# Patient Record
Sex: FEMALE | Race: WHITE
[De-identification: names, ages, dates, MRNs, and addresses within clinical notes are randomized per-mention and may not be internally consistent; named-entity substitution may affect disease eponyms.]

---

## 2017-04-14 ENCOUNTER — HOSPITAL ENCOUNTER (EMERGENCY)
Dept: HOSPITAL 68 - ERH | Age: 82
End: 2017-04-14
Payer: COMMERCIAL

## 2017-04-14 VITALS — SYSTOLIC BLOOD PRESSURE: 142 MMHG | DIASTOLIC BLOOD PRESSURE: 72 MMHG

## 2017-04-14 VITALS — BODY MASS INDEX: 36.82 KG/M2 | WEIGHT: 221 LBS | HEIGHT: 65 IN
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
## 2017-04-14 DIAGNOSIS — M54.2: ICD-10-CM

## 2017-04-14 DIAGNOSIS — S09.90XA: Primary | ICD-10-CM
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
## 2017-04-14 NOTE — ED MVC/FALL/TRAUMA COMPLAINT
History of Present Illness
 
General
Chief Complaint: Fall
Stated Complaint: BIBA FOR FALL
Source: patient, EMS
Exam Limitations: no limitations
 
Vital Signs & Intake/Output
Vital Signs & Intake/Output
 Vital Signs
 
 
Date Time Temp Pulse Resp B/P Pulse O2 O2 Flow FiO2
 
     Ox Delivery Rate 
 
 2341 98.4 64 18 142/72 95 Room Air  
 
 2100 98.2 66 18 159/80 94 Room Air  
 
 
 ED Intake and Output
 
 
 04/15 0000  1200
 
Intake Total 0 
 
Output Total  
 
Balance 0 
 
   
 
Intake, Oral 0 
 
Patient 221 lb 
 
Weight  
 
 
Allergies
Coded Allergies:
coconut oil (ITCHING ALL OVER  16)
codeine ("MY BP GOES WAY DOWN" 16)
 
Reconcile Medications
DABIGATRAN ETEXILATE MESYLATE (Pradaxa) 150 MG CAPSULE   1 CAP PO BID BLOOD 
THINNER  (Reported)
Furosemide (Lasix) 40 MG TABLET   1 TAB PO DAILY congestive heart failure
Levothyroxine Sodium (Synthroid) 0.175 MG TAB   0.175 MG PO DAILY AC THYROID  (
Reported)
Levothyroxine Sodium (Synthroid) 175 MCG TABLET   1 TAB PO DAILY THYROID HEALTH 
(Reported)
Losartan Potassium 25 MG TABLET   1 TAB PO DAILY BP  (Reported)
Metoprolol Succinate 50 MG TAB.ER.24H   50 MG PO DAILY BP  (Reported)
Multivitamin (Multi-Day Vitamins) 1 EACH TABLET   1 TAB PO DAILY SUPPLEMENT  (
Reported)
PAROXETINE HCL (Paroxetine) 40 MG TABLET   40 MG PO DAILY DEPRESSION  (Reported)
Pravastatin Sodium 10 MG TABLET   1 TAB PO DAILY CHOLESTEROL  (Reported)
Vit A,C & E/Lutein/Minerals (Ocuvite With Lutein Tablet) 1 EACH TABLET   1 TAB 
PO DAILY SUPPLEMENT  (Reported)
 
Triage Nurses Notes Reviewed? yes
Onset: Abrupt
Duration: minute(s):
Timing: single episode today
Severity: moderate
Injuries/Fall Location: head, neck
Method of Injury: fall
Loss of Consciousness: no loss of consciousness
Modifying Factors:
Worsens With: movement. 
Associated Symptoms: headache, neck pain
HPI:
80 yo woman
presents with headache and neck pain after a fall.
 
She notes that she was vacumming and her legs got caught in the power cord.  She
fell, hit her head on a cupboard on the way down.  She denies LOC.  She had no 
shortness of breath, chest pain, syncopal type symptoms.
 
She is otherwise well. 
 
Past History
 
Travel History
Traveled to [place] past 21 day No
 
Medical History
Any Pertinent Medical History? see below for history
Neurological: NONE
EENT: NONE
Cardiovascular: CHF, PACEMAKER CAROTID STENTS PER PT PACEMAKER
Respiratory: NONE
Gastrointestinal: NONE
Hepatic: NONE
Renal: NONE
Musculoskeletal: NONE
Psychiatric: NONE
Endocrine: NONE
Blood Disorders: NONE
Cancer(s): breast cancer
GYN/Reproductive: NONE
History of MRSA: No
History of VRE: No
History of CDIFF: No
 
Surgical History
Surgical History: STAUS POST THYROIDECTOMY
 
Psychosocial History
Who do you live with Spouse
Services at Home None
What is your primary language English
 
Family History
Family History, If Any:
FATHER, .
SISTER
Relation not specified for:
  FH: CHF (congestive heart failure)
  FH: kidney disease
 
Hx Contributory? No
 
Review of Systems
 
Review of Systems
Constitutional:
Reports: no symptoms. 
Eyes:
Reports: no symptoms. 
Ears, Nose, Throat, Mouth:
Reports: no symptoms. 
Respiratory:
Reports: no symptoms. 
Cardiovascular:
Reports: no symptoms. 
Gastrointestinal/Abdominal:
Reports: no symptoms. 
Genitourinary:
Reports: no symptoms. 
Musculoskeletal:
Reports: no symptoms. 
Skin:
Reports: no symptoms. 
Neurological/Psychological:
Reports: no symptoms. 
All Other Systems: Reviewed and Negative
 
Physical Exam
 
Physical Exam
General Appearance: well developed/nourished, mild distress
Head: atraumatic, normal appearance
Eyes:
Bilateral: normal appearance, PERRL, EOMI. 
Ears, Nose, Throat, Mouth: hearing grossly normal
Neck: normal inspection, supple, full range of motion, normal alignment
Respiratory: normal breath sounds, chest non-tender, no respiratory distress, 
quiet respiration, lungs clear
Cardiovascular: regular rate/rhythm
Gastrointestinal: normal bowel sounds, soft, non-tender, no organomegaly
Back: normal inspection, normal range of motion
Extremities: normal range of motion
Neurologic/Psych: no motor/sensory deficits, awake, alert, oriented x 3
Skin: intact, normal color, warm/dry
 
Core Measures
ACS in differential dx? No
Severe Sepsis Present: No
Septic Shock Present: No
 
Progress
Differential Diagnosis: C/T/L spine injury, ICH
Plan of Care:
 Orders
 
 
Procedure Date/time Status
 
EKG 2031 Active
 
 
Diagnostic Imaging:
Viewed by Me: CT Scan.  Discussed w/RAD: CT Scan. 
Radiology Impression: head/cervical ct... no acute disease... full report below.
 
Comments:
PATIENT: ROMMEL HENDERSON  MEDICAL RECORD NO: [id number]
PRESENT AGE: 81  PATIENT ACCOUNT NO: [id number]
: 35  LOCATION: [facility]
ORDERING PHYSICIAN: GREGORY ALFORD MD  
 
  SERVICE DATE: 
EXAM TYPE: CAT - CT CERV SPINE WO IV CONTRAST; CT HEAD WO IV CONTRAST
 
EXAMINATION:
CT HEAD AND CERVICAL SPINE.
 
CLINICAL INFORMATION:
Fall. Head and neck pain. On Pradaxa.
 
COMPARISON:
No relevant prior imaging available.
 
TECHNIQUE:
 images were obtained. CT acquisition of the head and cervical spine was
performed without intravenous administration of contrast. Data was
reformatted into multiplanar images at the acquisition workstation.
 
DLP: 1155.89 mGy-cm.
 
FINDINGS:
Head:
There is no acute intracranial hemorrhage or abnormal extra-axial collection.
There is a partially calcified dome-shaped dural based right convexity mass
near the vertex best illustrated on coronal image 137 of 226 series 601 that
measures 0.9 cm from base to apex that is most consistent with a meningioma.
There is no intracranial mass effect or midline shift. Lateral and third
ventricles are proportionate to the subarachnoid spaces. No hydrocephalus.
Ill-defined foci of hypoattenuation visualized within the periventricular
white matter most likely represent a chronic manifestation of small vessel
ischemia. Gray-white matter differentiation is otherwise preserved and there
is no evidence of acute territorial infarct. The calvarium and skull base are
intact. Mastoid air cells and middle ear cavities are well aerated.
Visualized paranasal sinuses are well aerated.
 
Cervical spine:
There is slight anterolisthesis of C3 on C4, C4 on C5, C5 on C6, and C7 on T1
related to advanced facet degenerative changes at these 4 levels. Vertebral
alignment is otherwise maintained in the sagittal dimension. Vertebral body
heights are preserved. There is no evidence of acute fracture. No abnormal
prevertebral soft tissue swelling. There is loss of intervertebral disc
height with associated sclerotic degenerative endplate changes at C6-C7.
Grossly there is no evidence of canal compromise. Heavily calcified
atheromatous plaque involves both carotid bifurcations. There is a
retropharyngeal course of both common carotid arteries. Soft tissues of the
neck are grossly unremarkable. Lung apices are clear.
 
IMPRESSION:
Head:
No acute intracranial hemorrhage. Incidentally there is a right convexity
meningioma near the vertex measuring 0.9 cm from base to apex.
 
Cervical spine:
No acute cervical spine fracture. There is multilevel degenerative
spondylosis of the cervical spine with slight anterolisthesis at multiple
levels related to advanced facet degenerative changes. Grossly no evidence of
bony canal compromise.
 
DICTATED BY: SHEEBA KENNEDY MD 
DATE/TIME DICTATED:17
:RAD.[facility] 
DATE/TIME TRANSCRIBED:17
 
CONFIDENTIAL, DO NOT COPY WITHOUT APPROPRIATE AUTHORIZATION.
 
 <Electronically signed in Other Vendor System>                                 
          
                                           SIGNED BY: SHEEBA KENNEDY MD 
 
Departure
 
Departure
Disposition: HOME OR SELF CARE
Condition: Stable
Clinical Impression
Primary Impression: Fall
Secondary Impressions: Head injury, Neck pain
Referrals:
SHAYE WHITT,SHENG VICENTE (PCP/Family)
 
Departure Forms:
Customer Survey
General Discharge Information
Comments
pt well appearing and would like to go home... discussed findings at length, 
including meningioma... and showed family/patient the ct scan dictation.... pt 
safe for discharge with close follow up.

## 2017-04-14 NOTE — CT SCAN REPORT
EXAMINATION:
CT HEAD AND CERVICAL SPINE.
 
CLINICAL INFORMATION:
Fall. Head and neck pain. On Pradaxa.
 
COMPARISON:
No relevant prior imaging available.
 
TECHNIQUE:
 images were obtained. CT acquisition of the head and cervical spine was
performed without intravenous administration of contrast. Data was
reformatted into multiplanar images at the acquisition workstation.
 
DLP: 1155.89 mGy-cm.
 
FINDINGS:
Head:
There is no acute intracranial hemorrhage or abnormal extra-axial collection.
There is a partially calcified dome-shaped dural based right convexity mass
near the vertex best illustrated on coronal image 137 of 226 series 601 that
measures 0.9 cm from base to apex that is most consistent with a meningioma.
There is no intracranial mass effect or midline shift. Lateral and third
ventricles are proportionate to the subarachnoid spaces. No hydrocephalus.
Ill-defined foci of hypoattenuation visualized within the periventricular
white matter most likely represent a chronic manifestation of small vessel
ischemia. Gray-white matter differentiation is otherwise preserved and there
is no evidence of acute territorial infarct. The calvarium and skull base are
intact. Mastoid air cells and middle ear cavities are well aerated.
Visualized paranasal sinuses are well aerated.
 
Cervical spine:
There is slight anterolisthesis of C3 on C4, C4 on C5, C5 on C6, and C7 on T1
related to advanced facet degenerative changes at these 4 levels. Vertebral
alignment is otherwise maintained in the sagittal dimension. Vertebral body
heights are preserved. There is no evidence of acute fracture. No abnormal
prevertebral soft tissue swelling. There is loss of intervertebral disc
height with associated sclerotic degenerative endplate changes at C6-C7.
Grossly there is no evidence of canal compromise. Heavily calcified
atheromatous plaque involves both carotid bifurcations. There is a
retropharyngeal course of both common carotid arteries. Soft tissues of the
neck are grossly unremarkable. Lung apices are clear.
 
IMPRESSION:
Head:
No acute intracranial hemorrhage. Incidentally there is a right convexity
meningioma near the vertex measuring 0.9 cm from base to apex.
 
Cervical spine:
No acute cervical spine fracture. There is multilevel degenerative
spondylosis of the cervical spine with slight anterolisthesis at multiple
levels related to advanced facet degenerative changes. Grossly no evidence of
bony canal compromise.

## 2017-05-17 ENCOUNTER — HOSPITAL ENCOUNTER (INPATIENT)
Dept: HOSPITAL 68 - ERH | Age: 82
LOS: 5 days | Discharge: NURSING, REHAB OR LONG-TERM CARE | DRG: 481 | End: 2017-05-22
Payer: COMMERCIAL

## 2017-05-17 VITALS — DIASTOLIC BLOOD PRESSURE: 80 MMHG | SYSTOLIC BLOOD PRESSURE: 122 MMHG

## 2017-05-17 VITALS — BODY MASS INDEX: 38.58 KG/M2 | HEIGHT: 64 IN | WEIGHT: 226 LBS

## 2017-05-17 VITALS — SYSTOLIC BLOOD PRESSURE: 112 MMHG | DIASTOLIC BLOOD PRESSURE: 60 MMHG
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
## 2017-05-17 DIAGNOSIS — E03.9: ICD-10-CM
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
## 2017-05-17 DIAGNOSIS — Z85.3: ICD-10-CM

## 2017-05-17 DIAGNOSIS — N17.9: ICD-10-CM

## 2017-05-17 DIAGNOSIS — I95.9: ICD-10-CM

## 2017-05-17 DIAGNOSIS — J44.9: ICD-10-CM

## 2017-05-17 DIAGNOSIS — I50.32: ICD-10-CM

## 2017-05-17 DIAGNOSIS — K59.00: ICD-10-CM

## 2017-05-17 DIAGNOSIS — M81.0: ICD-10-CM

## 2017-05-17 DIAGNOSIS — E78.5: ICD-10-CM

## 2017-05-17 DIAGNOSIS — I48.2: ICD-10-CM

## 2017-05-17 DIAGNOSIS — S72.22XA: ICD-10-CM

## 2017-05-17 DIAGNOSIS — I27.2: ICD-10-CM

## 2017-05-17 DIAGNOSIS — I11.0: ICD-10-CM

## 2017-05-17 DIAGNOSIS — S72.142A: Primary | ICD-10-CM

## 2017-05-17 DIAGNOSIS — Z95.0: ICD-10-CM

## 2017-05-17 DIAGNOSIS — Z79.01: ICD-10-CM

## 2017-05-17 DIAGNOSIS — D62: ICD-10-CM

## 2017-05-17 DIAGNOSIS — Z99.81: ICD-10-CM

## 2017-05-17 LAB
ABSOLUTE GRANULOCYTE CT: 4.2 /CUMM (ref 1.4–6.5)
APTT BLD: 51 SEC (ref 25–37)
BASOPHILS # BLD: 0 /CUMM (ref 0–0.2)
BASOPHILS NFR BLD: 0.3 % (ref 0–2)
EOSINOPHIL # BLD: 0.2 /CUMM (ref 0–0.7)
EOSINOPHIL NFR BLD: 3.4 % (ref 0–5)
ERYTHROCYTE [DISTWIDTH] IN BLOOD BY AUTOMATED COUNT: 14 % (ref 11.5–14.5)
GRANULOCYTES NFR BLD: 58.8 % (ref 42.2–75.2)
HCT VFR BLD CALC: 43.9 % (ref 37–47)
LYMPHOCYTES # BLD: 1.9 /CUMM (ref 1.2–3.4)
MCH RBC QN AUTO: 32.9 PG (ref 27–31)
MCHC RBC AUTO-ENTMCNC: 33.5 G/DL (ref 33–37)
MCV RBC AUTO: 98.1 FL (ref 81–99)
MONOCYTES # BLD: 0.8 /CUMM (ref 0.1–0.6)
PLATELET # BLD: 201 /CUMM (ref 130–400)
PMV BLD AUTO: 8.9 FL (ref 7.4–10.4)
PROTHROMBIN TIME: 13.5 SEC (ref 9.4–12.5)
RED BLOOD CELL CT: 4.48 /CUMM (ref 4.2–5.4)
WBC # BLD AUTO: 7.2 /CUMM (ref 4.8–10.8)

## 2017-05-17 PROCEDURE — C9399 UNCLASSIFIED DRUGS OR BIOLOG: HCPCS

## 2017-05-17 PROCEDURE — P9016 RBC LEUKOCYTES REDUCED: HCPCS

## 2017-05-17 PROCEDURE — 2NASP: CPT

## 2017-05-17 NOTE — RADIOLOGY REPORT
EXAMINATION:\H\
\N\XR CHEST
 
CLINICAL INFORMATION:
Fall. Hip fracture
 
COMPARISON:
09/19/2016
 
TECHNIQUE:
AP view of the chest was obtained.
 
FINDINGS:
No acute findings compared to the prior radiograph. Probable chronic, mild
atelectasis in the lingula adjacent to the prominent pericardial fat pad. No
pulmonary edema, focal consolidation or pleural effusion. Cardiac silhouette
is chronically enlarged. The single lead pacemaker is in satisfactory
position. Thoracic aorta is calcified. Chronic osteoarthritis of glenohumeral
joints. Mild dextrocurvature of the degenerated thoracic spine.
 
IMPRESSION:
- Cardiomegaly without pulmonary edema.
- No acute cardiopulmonary disease compared to 09/29/2016.

## 2017-05-17 NOTE — ED MVC/FALL/TRAUMA COMPLAINT
History of Present Illness
 
General
Chief Complaint: Fall
Stated Complaint: FALL
Source: patient, old records, EMS
Exam Limitations: no limitations
 
Vital Signs & Intake/Output
Vital Signs & Intake/Output
 Vital Signs
 
 
Date Time Temp Pulse Resp B/P B/P Pulse O2 O2 Flow FiO2
 
     Mean Ox Delivery Rate 
 
 1457 97.5 89 20 122/80  98   
 
 1402  84 18 140/74  93 Room Air  
 
 1218 98.4 78 18 162/70  93 Room Air  
 
 1040      93   
 
 1013 97.9 85 20 178/107  93 Room Air  
 
 
Allergies
Coded Allergies:
coconut oil (ITCHING ALL OVER  16)
codeine ("MY BP GOES WAY DOWN" 16)
 
Reconcile Medications
Dabigatran Etexilate Mesylate (Pradaxa 150 MG) 150 MG CAPSULE   1 CAP PO BID 
BLOOD THINNER  (Reported)
Furosemide 40 MG TABLET   1 TAB PO DAILY CHF  (Reported)
Levothyroxine Sodium (Synthroid) 175 MCG TABLET   1 TAB PO DAILY AC THYROID  (
Reported)
Losartan Potassium (Cozaar) 25 MG TABLET   1 TAB PO DAILY BP  (Reported)
Metoprolol Succinate 50 MG TAB.ER.24H   1 TAB PO DAILY BP  (Reported)
Multivitamin (Multi-Day Vitamins) 1 EACH TABLET   1 TAB PO DAILY SUPPLEMENT  (
Reported)
Paroxetine HCl 40 MG TABLET   1 TAB PO DAILY DEPRESSION  (Reported)
Pravastatin Sodium 10 MG TABLET   1 TAB PO DAILY CHOLESTEROL  (Reported)
Vit A,C & E/Lutein/Minerals (Ocuvite With Lutein Tablet) 1 EACH TABLET   1 TAB 
PO DAILY SUPPLEMENT  (Reported)
 
Triage Note:
BIBA FROM HOME, S/P FALL IN BATHROOM, STATES SHE
TRIPPED OVER HER 'S OXYGEN HOSE, HIT LEFT
SIDE OF HEAD AND LEFT HIP.  C/O PAIN IN LEFT HIP.
DENIES DIZZINESS, WEAKNESS, CHEST PAIN PRIOR TO
FALL.  PT IS ON PRADAXA.
Triage Nurses Notes Reviewed? yes
Onset: Abrupt
Duration: hour(s): (1), constant
Timing: recent history
Severity: severe
Severity Numbers: 10
Injuries/Fall Location: lower extremity
Method of Injury: fall
Loss of Consciousness: no loss of consciousness
Modifying Factors:
Worsens With: movement. 
Associated Symptoms: DENIES
HPI:
This is an 81-year-old female history of CHF and A. fib on pradaxa presents to 
the ER status post mechanical fall when she tripped over her 's oxygen 
hose striking her left hip.  She now presents complaining of moderate to severe 
left hip pain is worse with movement.  She denies any prodromal dizziness 
lightheadedness chest pain palpitations prior to the fall.  She states that she 
did bump her head however there is no loss of consciousness she denies any neck 
or back pain or arm pain chest pain abdominal pain or other injury.
 
 
(JAMIE PAULINO)
 
Past History
 
Travel History
Traveled to [place] past 21 day No
 
Medical History
Any Pertinent Medical History? see below for history
Neurological: NONE
EENT: NONE
Cardiovascular: AFIB, CHF, PACEMAKER CAROTID STENTS PER PT PACEMAKER
Respiratory: NONE
Gastrointestinal: NONE
Hepatic: NONE
Renal: NONE
Musculoskeletal: NONE
Psychiatric: NONE
Endocrine: NONE
Blood Disorders: NONE
Cancer(s): breast cancer
GYN/Reproductive: NONE
History of MRSA: No
History of VRE: No
History of CDIFF: No
 
Surgical History
Surgical History: STAUS POST THYROIDECTOMY
 
Psychosocial History
Who do you live with Spouse
Services at Home None
What is your primary language English
Tobacco Use: Quit >30 days ago
ETOH Use: occasional use
 
Family History
Family History, If Any:
FATHER, .
SISTER
Relation not specified for:
  FH: CHF (congestive heart failure)
  FH: kidney disease
 
Hx Contributory? No
(JAMIE PAULINO)
 
Review of Systems
 
Review of Systems
Constitutional:
Reports: see HPI. 
All Other Systems: Reviewed and Negative
Comments
Review of systems: See HPI, All other systems negative.
Constitutional, no chills no fever, no malaise 
HEENT:  no sore throat no congestion
Cardiovascular: No chest pain , no palpitation , no orthopnea 
Skin:  no rashes, no change in skin
Respiratory: No dyspnea no cough no  sputum 
GI: No nausea no vomiting, no diarrhea,
: No dysuria No hematuria,
Muscle skeletal: joint pain, no joint swelling, no back pain, no neck pain,
Neurologic: No numbness no confusion, no headache
Psych: No stress 
Heme/endocrine: No bruising 
Immunology: No lymphadenopathy
(JAMIE PAULINO)
 
Physical Exam
 
Physical Exam
General Appearance: well developed/nourished, alert, awake, moderate distress
Comments:
Well-developed well-nourished person in no acute distress
HEENT: Normal EENT exam; PERRL, EOMI, no nystagmus. HEAD is atraumatic. moist 
mucous membranes.  
Neck: Supple,, normal range of motion without pain or tenderness
Back: Nontender, no CVA tenderness. Full range of motion
Cardiovascular: Regular rate and rhythms no murmurs rubs or gallops,
Respiratory: chest nontender, No respiratory distress.  Patient speaking in full
complete sentences. Breath sounds clear to auscultation bilaterally: NO W/R/R
Abdomen: Soft, nontender nondistended, no appreciable organomegaly. Normal bowel
sounds. No rebound/guarding, No appreciable enlargement of the abdominal aorta, 
No ascites.
Upper Extremity: No edema, full range of motion of extremities, normal and equal
pulses bilaterally, 5 out of 5 strength noted to bilateral upper and lower 
extremities
Hip/Pelvis: L leg is shortned and externall rotated, Severely lrom seconary to 
pain, r hip is Atraumatic/Stable. FROM.
Knee: Atraumatic/stable. FROM. No joint swelling, no effusion. No laxity. 
Negative lachman/anterior drawer test. No pain with ROM 
Leg: Atraumatic. Nontender. No edema,  5 out of 5 strength in the lower 
extremity, normal dorsiflexion of great toe bilaterally, gross sensation is 
intact, patellar tendon reflex 2+ bilaterally.
Ankle/Foot:  Atraumatic/stable. Skin intact. FROM. No swelling, no effusion. No 
laxity on exam 
Pulses: Normal/equal DP/PT pulses bilaterally. Brisk cap refill
Neuro: Alert oriented x3, motor sensory normal, cranial nerves II through XII 
grossly intact. There were no obvious focal neurologic abnormalities.
Skin: No appreciable rash on exposed skin, skin is warm and dry.
Psych: Mood and affect is normal, memory and judgment is normal.
 
Core Measures
ACS in differential dx? Yes
Severe Sepsis Present: No
Septic Shock Present: No
(REE WEAVER,JAMIE)
 
Progress
Differential Diagnosis: C/T/L spine injury, ext injury, ICH, pelvis injury, 
spinal cord injury
Plan of Care:
 Orders
 
 
Procedure Date/time Status
 
PT Evaluate & Treat   UNK Active
 
Heart Healthy Diet  D Active
 
Vital Signs  1355 Active
 
Teach/Educate  1355 Active
 
Pain Treatment and Response  1355 Active
 
Nutritional Intake, Monitor  1355 Active
 
Isolation  1355 Active
 
Intake & Output  1355 Active
 
Patient Care Conference  1355 Active
 
Activity/Ambulation  135 Active
 
Pathway - chart  1253 Active
 
House Staff  1253 Active
 
Interiano, Insertion/Removal/Asses  1221 Active
 
CULTURE,URINE  1221 Active
 
Patient Data  1217 Active
 
ED Holding Orders  1213 Active
 
Vital Signs  1213 Active
 
Code Status  1213 Active
 
Admit to inpatient  1212 Active
 
Telemetry/Cardiac Monitor  1030 Complete
 
TROPONIN LEVEL  1030 Complete
 
PARTIAL THROMBOPLASTIN TIME  1030 Complete
 
PROTHROMBIN TIME  1030 Complete
 
COMPREHENSIVE METABOLIC PANEL  1030 Complete
 
CBC WITHOUT DIFFERENTIAL  1030 Complete
 
EKG  1030 Active
 
TYPE & SCREEN (NOT X-MATCH)  1030 Active
 
Intake & Output  1022 Active
 
VTE Mechanical Prophylaxis   UNK Active
 
 
 Current Medications
 
 
  Sig/Shauna Start time  Last
 
Medication Dose  Stop Time Status Admin
 
Pravastatin Sodium 10 MG 1700  1700 AC 
 
(Pravachol)     
 
Furosemide 40 MG DAILY  1000 AC 
 
(Lasix)     
 
Losartan Potassium 25 MG DAILY  1000 AC 
 
(Cozaar)     
 
Metoprolol Succinate 50 MG DAILY  1000 AC 
 
(Toprol Xl)     
 
Paroxetine HCl 40 MG DAILY  1000 AC 
 
(Paxil)     
 
Levothyroxine Sodium 0.175 MG DAILY AC  0700 AC 
 
(Synthroid)     
 
Heparin Sodium  5,000 UNIT Q8  2200 AC 
 
(Porcine)     
 
Morphine Sulfate 2 MG Q6P PRN  1415 AC 
 
(Morphine)     1524
 
Acetaminophen 650 MG Q6P PRN  1300 AC 
 
(Tylenol)     
 
Ketorolac  15 MG Q6P PRN  1300 AC 
 
Tromethamine    1259  
 
(Toradol)     
 
 
 Laboratory Tests
 
 
 
17 1030:
Anion Gap 11, Estimated GFR > 60, BUN/Creatinine Ratio 21.7, Glucose 104  H, 
Calcium 9.5, Total Bilirubin 0.7, AST 32, ALT 34, Alkaline Phosphatase 101, 
Troponin I < 0.01, Total Protein 7.2, Albumin 4.0, Globulin 3.2, Albumin/
Globulin Ratio 1.3, PT 13.5  H, INR 1.29  H, APTT 51  H, CBC w Diff NO MAN DIFF 
REQ, RBC 4.48, MCV 98.1, MCH 32.9  H, RDW 14.0, MPV 8.9, Gran % 58.8, 
Lymphocytes % 26.7, Monocytes % 10.8  H, Eosinophils % 3.4, Basophils % 0.3, 
Absolute Granulocytes 4.2, Absolute Lymphocytes 1.9, Absolute Monocytes 0.8  H, 
Absolute Eosinophils 0.2, Absolute Basophils 0, PUBS MCHC 33.5
 Microbiology
 1225  URINE ROUT: Urine Culture - RECD
 
Patient medicated with morphine 2 mg IV labs ordered x-rays CAT scan ordered 
case discussed with Dr. altamirano who eval the pt and agrees with plan
 
 
D/W THE PT AND HER DAUGHTER HER CT AND XDRAY RESULTS INCLUDING INCIDENTAL 
FINDINGS, CALL PLACED TO ORTHO
 
Case discussed with surgical PA evaluate the patient and Dr. Cabrera will admit to
GEN med discussed the patient and her family her x-ray results, [facility] office spoke with the unit secretary and advised that the physician
has seen the xray advised to have surgical pa eval the pt and admit to medicine
 
 
(REE WEAVER,JAMIE)
Diagnostic Imaging:
Viewed by Me: Radiology Read, CT Scan.  Discussed w/RAD: Radiology Read, CT 
Scan. 
Radiology Impression: PATIENT: ROMMEL HENDERSON  MEDICAL RECORD NO: [id number] 
PRESENT AGE: 81  PATIENT ACCOUNT NO: [id number] : 35  LOCATION: [facility] 
ORDERING PHYSICIAN: JAMIE WEAVER     SERVICE DATE:  EXAM TYPE: 
RAD - XRY-ANKLE 3 OR MORE VIEWS L; XRY-HIP 2-3 VIEWS, LEFT EXAMINATION: CR LEFT 
HIP. CR LEFT ANKLE. CLINICAL INFORMATION: Fall. Left hip pain. Presumptive 
diagnosis of hip fracture. Ankle pain. COMPARISON: CT scan of the abdomen and 
pelvis dated 2015. TECHNIQUE: Frontal and frog-leg lateral views of the 
left hip. Single lateral view of the left ankle. FINDINGS: Left hip: Evaluation 
is limited due to difficulties with patient positioning. Diffuse osteopenia. 
Mildly comminuted intratrochanteric fracture of the left proximal femur with 
varus deformity and slight overriding of fracture fragments. Left femoral head 
remains located within the acetabulum. Mild degenerative changes seen in the 
left hip joint with joint space narrowing, spurring and cystic changes noted 
across both sides of the joint. Mild sclerotic changes are also seen at the 
pubic symphysis and sacroiliac joint. Arteriovascular calcifications seen in the
groin and proximal thigh. Left ankle: Single lateral view demonstrates a side 
plate with interlocking screws projected over the distal fibula. There is marked
flattening of the talar dome and bone-on-bone appearance of the tibiotalar joint
with associated biopsy, sclerotic change and cystic change noted. Flattening of 
the plantar arch is seen. Fusion of the calcaneus and talus is noted. No 
significant ankle joint effusion is seen. Mild soft tissue swelling is noted 
about the anterior aspect of the ankle joint. Diffuse osteopenia is present. 
Degenerative changes as seen in the intertarsal joints. IMPRESSION: 1. Acute 
comminuted and mildly impacted intertrochanteric fracture of the left proximal 
femur. 2. Evaluation of left ankle limited but there is evidence of prior open 
reduction internal fixation presumably of the distal fibular fracture. 
Arthrodesis of the hindfoot is also noted as discussed above. 3. Osteopenia. 
DICTATED BY: HAN CRAIN MD  DATE/TIME DICTATED:17 
:RAD.[place]  DATE/TIME TRANSCRIBED:17 CONFIDENTIAL, 
DO NOT COPY WITHOUT APPROPRIATE AUTHORIZATION.  <Electronically signed in Other 
Vendor System>                                                                  
                     SIGNED BY: HAN CRAIN MD 17 1223, PATIENT: 
ROMMEL HENDERSON  MEDICAL RECORD NO: [id number] PRESENT AGE: 81  PATIENT ACCOUNT NO
: [id number] : 35  LOCATION: [facility] ORDERING PHYSICIAN: JAMIE WEAVER     
SERVICE DATE:  EXAM TYPE: RAD - XRY-CHEST XRAY, ONE VIEW ONLY 
EXAMINATION:\H\ \N\XR CHEST CLINICAL INFORMATION: Fall. Hip fracture COMPARISON:
2016 TECHNIQUE: AP view of the chest was obtained. FINDINGS: No acute 
findings compared to the prior radiograph. Probable chronic, mild atelectasis in
the lingula adjacent to the prominent pericardial fat pad. No pulmonary edema, 
focal consolidation or pleural effusion. Cardiac silhouette is chronically 
enlarged. The single lead pacemaker is in satisfactory position. Thoracic aorta 
is calcified. Chronic osteoarthritis of glenohumeral joints. Mild 
dextrocurvature of the degenerated thoracic spine. IMPRESSION: - Cardiomegaly 
without pulmonary edema. - No acute cardiopulmonary disease compared to 2016. DICTATED BY: NATALIYA ANDERSON MD  DATE/TIME DICTATED:17 
:RAD.[place]  DATE/TIME TRANSCRIBED:17 CONFIDENTIAL, 
DO NOT COPY WITHOUT APPROPRIATE AUTHORIZATION.  <Electronically signed in Other 
Vendor System>                                                                  
                     SIGNED BY: NATALIYA ANDERSON MD 17, PATIENT: ROMMEL HENDERSON  MEDICAL RECORD NO: [id number] PRESENT AGE: 81  PATIENT ACCOUNT NO: 
[id number] : 35  LOCATION: [facility] ORDERING PHYSICIAN: JAMIE WEAVER     
SERVICE DATE:  EXAM TYPE: CAT - CT HEAD WO IV CONTRAST EXAMINATION:
CT HEAD WITHOUT CONTRAST CLINICAL INFORMATION: One projects a. Fall. Hit head. 
Assess for intracranial bleed. COMPARISON: CT scan of the head 2017. 
TECHNIQUE: Contiguous axial imaging was performed from the skull base to vertex 
without intravenous administration of contrast. DLP: 617.42 mGy-cm FINDINGS: 
There is no evidence of acute intracranial hemorrhage or territorial infarction.
No abnormal mass effect or midline shift is seen. Gray to white matter 
differentiation is well preserved. No extra-axial fluid collections are 
identified. The ventricles are normal in size. There are scattered areas of low 
attenuation in the periventricular and subcortical white matter, consistent with
chronic microvascular ischemic changes. There are no acute osseous findings. 
There is hyperostosis frontalis interna. An extra-axial well-defined partially 
calcified lesion in the high left frontal region medially which measures 1.1 cm 
may be consistent with a meningioma. There is no significant mass effect on the 
adjacent brain parenchyma. There are degenerative changes at the left 
temporomandibular joint. There have been bilateral lens extractions. There are 
no large scalp contusions or hematomas. The mastoid air cells and visualized 
portions of the paranasal sinuses are well aerated. IMPRESSION: 1. There are no 
intracranial bleeds or territorial infarcts. 2. There is likely a meningioma in 
the high right frontal region medially, unchanged. 3. There are changes 
consistent with chronic microvascular ischemic disease. DICTATED BY: JEN SERRANO MD  DATE/TIME DICTATED:17 :RAD.[place]  DATE/TIME 
TRANSCRIBED:17 CONFIDENTIAL, DO NOT COPY WITHOUT APPROPRIATE 
AUTHORIZATION.  <Electronically signed in Other Vendor System>                  
                                                                     SIGNED BY: 
JEN SERRANO MD 17 1232
Initial ED EKG: vpaced at 60, normal axis,no acute st seg changes
Prior EKG: unchanged (2016)
(JAIME PAULINO)
 
Departure
 
Departure
Disposition: STILL A PATIENT
Condition: Stable
Clinical Impression
Primary Impression: Intertrochanteric fracture of left hip
Secondary Impressions: Fall
Referrals:
SHAYE WHITT,SHENG VICENTE (PCP/Family)
 
Departure Forms:
Customer Survey
General Discharge Information
 
Admission Note
Spoke With:
ALLAN CABRERA MD
Documentation of Exam:
Documentation of any treatments & extenuating circumstances including Concerns 
Regarding Discharge (functional status, medication knowledge or non-compliance, 
living conditions, etc.) that warrant an admission rather than observation: will
require will require surgical fixation, ortho, cardiology clearance, premature 
discharge would be medically harmful
 
(JAMIE PAULINO)
 
PA/NP Co-Sign Statement
Statement:
ED Attending supervision documentation-
 
[X] I saw and evaluated the patient. I have also reviewed all the pertinent lab 
results and diagnostic results. I agree with the findings and the plan of care 
as documented in the PA's/NP's documentation. 
 
[] I have reviewed the ED Record and agree with the PA's/NP's documentation.
 
[] Additions or exceptions (if any) to the PAs/NP's note and plan are 
summarized below:
[]
 
(MAGAN ALTAMIRANO DO)

## 2017-05-17 NOTE — RADIOLOGY REPORT
EXAMINATION:
CR LEFT HIP. CR LEFT ANKLE.
 
CLINICAL INFORMATION:
Fall. Left hip pain. Presumptive diagnosis of hip fracture. Ankle pain.
 
COMPARISON:
CT scan of the abdomen and pelvis dated 05/20/2015.
 
TECHNIQUE:
Frontal and frog-leg lateral views of the left hip. Single lateral view of
the left ankle.
 
FINDINGS:
Left hip: Evaluation is limited due to difficulties with patient positioning.
 
Diffuse osteopenia. Mildly comminuted intratrochanteric fracture of the left
proximal femur with varus deformity and slight overriding of fracture
fragments. Left femoral head remains located within the acetabulum. Mild
degenerative changes seen in the left hip joint with joint space narrowing,
spurring and cystic changes noted across both sides of the joint. Mild
sclerotic changes are also seen at the pubic symphysis and sacroiliac joint.
Arteriovascular calcifications seen in the groin and proximal thigh.
 
Left ankle: Single lateral view demonstrates a side plate with interlocking
screws projected over the distal fibula. There is marked flattening of the
talar dome and bone-on-bone appearance of the tibiotalar joint with
associated biopsy, sclerotic change and cystic change noted. Flattening of
the plantar arch is seen. Fusion of the calcaneus and talus is noted. No
significant ankle joint effusion is seen. Mild soft tissue swelling is noted
about the anterior aspect of the ankle joint. Diffuse osteopenia is present.
Degenerative changes as seen in the intertarsal joints.
 
IMPRESSION:
 
1. Acute comminuted and mildly impacted intertrochanteric fracture of the
left proximal femur.
2. Evaluation of left ankle limited but there is evidence of prior open
reduction internal fixation presumably of the distal fibular fracture.
Arthrodesis of the hindfoot is also noted as discussed above.
3. Osteopenia.

## 2017-05-17 NOTE — HISTORY & PHYSICAL
MALOU CHAKRABORTY MD 17 [id number]:
General Information and HPI
MD Statement:
I have seen and personally examined ROMMEL HENDERSON and documented this H&P.
 
The patient is a 81 year old F who presented with a patient stated chief 
complaint of [right hip pain].
 
Source of Information: patient, family
History of Present Illness:
THIS IS A 81-year-old woman with a past medical history of chronic atrial 
fibrillation (anticoagulated with Pradaxa), sick sinus syndrome, status post 
pacemaker implantation  2 years back, hypothyroidism and COPD on home oxygen as 
weLL all, pulmonary hypertension with RV pressures greater than 50 on the last 
echo in 2016, who presented to the [facility] after she had a mechanical 
fall while trying to ambulate with her walker.
As per the patient she was trying to get out of the bed and her walker caught 
tangled in the oxygen tubing(FOR her ).  The patient slightly twisted her
leg and landed up on her left hip.  The patient never lost consciousness, denied
any dizziness, palpitations, chest pain prior to the fall.
Patient has an extensive cardiac history as mentioned above and has taken her 
dose of pradaxa in 
The morning
 
Allergies/Medications
Allergies:
Coded Allergies:
coconut oil (ITCHING ALL OVER  16)
codeine ("MY BP GOES WAY DOWN" 16)
 
Home Med list
Dabigatran Etexilate Mesylate (Pradaxa 150 MG) 150 MG CAPSULE   1 CAP PO BID 
BLOOD THINNER  (Reported)
Furosemide 40 MG TABLET   1 TAB PO DAILY CHF  (Reported)
Levothyroxine Sodium (Synthroid) 175 MCG TABLET   1 TAB PO DAILY AC THYROID  (
Reported)
Losartan Potassium (Cozaar) 25 MG TABLET   1 TAB PO DAILY BP  (Reported)
Metoprolol Succinate 50 MG TAB.ER.24H   1 TAB PO DAILY BP  (Reported)
Multivitamin (Multi-Day Vitamins) 1 EACH TABLET   1 TAB PO DAILY SUPPLEMENT  (
Reported)
Paroxetine HCl 40 MG TABLET   1 TAB PO DAILY DEPRESSION  (Reported)
Pravastatin Sodium 10 MG TABLET   1 TAB PO DAILY CHOLESTEROL  (Reported)
Vit A,C & E/Lutein/Minerals (Ocuvite With Lutein Tablet) 1 EACH TABLET   1 TAB 
PO DAILY SUPPLEMENT  (Reported)
 
 
Past History
 
Travel History
Traveled to [place] past 21 day No
 
Medical History
Neurological: NONE
EENT: NONE
Cardiovascular: AFIB, CHF, PACEMAKER CAROTID STENTS PER PT PACEMAKER
Respiratory: NONE
Gastrointestinal: NONE
Hepatic: NONE
Renal: NONE
Musculoskeletal: NONE
Psychiatric: NONE
Endocrine: NONE
Blood Disorders: NONE
Cancer(s): breast cancer
GYN/Reproductive: NONE
History of MRSA: No
History of VRE: No
History of CDIFF: No
 
Surgical History
Surgical History: STAUS POST THYROIDECTOMY
 
Past Family/Social History
 
Family History
Relations & Conditions if any
FATHER, .
SISTER
MOTHER
MOTHER
Relation not specified for:
  FH: CHF (congestive heart failure)
  FH: hypertension
  FH: kidney disease
 
 
Psychosocial History
Services at Home: None
Primary Language: English
Smoking Status: Never Smoked
ETOH Use: occasional use
 
Review of Systems
 
Review of Systems
Constitutional:
Reports: see HPI. 
EENTM:
Reports: see HPI. 
Cardiovascular:
Reports: chest pain.  Denies: edema, orthopena, palpitations. 
Respiratory:
Denies: cough, hemoptysis, orthopnea, short of breath. 
GI:
Denies: abdominal pain, constipation, distention. 
Genitourinary:
Denies: dysuria, frequency, hematuria, hesitation. 
Musculoskeletal:
Reports: see HPI. 
 
Exam & Diagnostic Data
Last 24 Hrs of Vital Signs/I&O
 Vital Signs
 
 
Date Time Temp Pulse Resp B/P B/P Pulse O2 O2 Flow FiO2
 
     Mean Ox Delivery Rate 
 
 1218 98.4 78 18 162/70  93 Room Air  
 
 1040      93   
 
 1013 97.9 85 20 178/107  93 Room Air  
 
 
 Intake & Output
 
 
  1600  0800  0000
 
Intake Total 10  
 
Output Total   
 
Balance 10  
 
    
 
Intake, IV 10  
 
Patient 226 lb  
 
Weight   
 
Weight Reported by Patient  
 
Measurement   
 
Method   
 
 
 
 
Physical Exam
General Appearance Alert, Oriented X3, Cooperative
Skin No Rashes, No Breakdown
Skin Temp/Moisture Exam: Warm/Dry
Neck Supple, No JVD
Lymphatic Axillary nl, Cervical nl
Cardiovascular Normal S1, Normal S2
Lungs Clear to Auscultation, Normal Air Movement
Abdomen Normal Bowel Sounds, Soft, No Tenderness, No Hepatospenomegaly
Neurological Normal Speech, Strength at 5/5 X4 Ext, Normal Tone
Extremities left leg shortened internally rotated
 
Assessment/Plan
Assessment:
89-year-old female with a past medical history of atrial fibrillation on 
Protonix, sick sinus syndrome status post pacemaker placement, to [facility] after having a mechanical fall which ended up in the left hip fracture
 
Vitals at the time of admission showed blood pressure 162/70, afebrile, 
saturation of 94% on room air, respiration rate of 18,
 
Labs shows
 
WBC of 7.2, stable hemoglobin and hematocrit,
 
Basic electrolyte panel within normal limits
 
INR 1.29
 
EKG shows ventricle paced rhythm
 
HIP xRY:
1. Acute comminuted and mildly impacted intertrochanteric fracture of the
left proximal femur.
2. Evaluation of left ankle limited but there is evidence of prior open
reduction internal fixation presumably of the distal fibular fracture.
Arthrodesis of the hindfoot is also noted as discussed above.
3. Osteopenia.
 
Asessment:
1. Acute Communted intratrochanteric fracture of left hip
2. H/O atrial fibrialltion on pradaxa with last dose on on the morning of 
3.H/o sick sinus syndrome s/p pacemaker placement
4.H/O aortic aneyrusm s/p stent placemnet 
5.H/O HLD
6.H/O Hypothyroidism and on synthroid.
 
PLan:
Admit to [facility] for aniticipation of surgery .  Patient needs to be off 
Pradaxa 48 hours and hence we will make the patient nothing by mouth on Thursday
night with the plan of surgery on Friday morning
Spoke to the patient's cardiologist Dr. Kale Fisher who is in agreement unfolding
Pradaxa and no plans of bridging with IV heparin as the patient does not carry 
any previous history of stroke
Dr. Navi Solorio will do a formal consult in the morning
Continue with IV fluids and IV morphine for pain control
Continue with all other home medications
Appreciate orthopedic consult
DVT prophylaxis with S/q Heaprin
Fc
Pain pathway
 
 
As Ranked By This Provider
Problem List:
 1. CHF (congestive heart failure)
 
 2. Intertrochanteric fracture of left hip
 
 
Core Measures/Miscellaneous
 
Acute Coronary Syndrome
ACS Diagnosis: No
 
Cerebrovascular Accident
CVA/TIA Diagnosis: No
 
Congestive Heart Failure
CHF Diagnosis: No
 
Venous Thromboembolism
VTE Risk Factors: Acute medical illness, Age > 40
No [facility] VTE prophylaxis d/t: VTE low risk, No contraindications
No VTE Pharm Prophylaxis d/t: VTE low risk, No contraindications
VTE Diagnosis: No
VTE Type: NONE
VTE Confirmed by (Test): NONE
 
Severe Sepsis
Severe Sepsis Present: No
 
Septic Shock
Septic Shock Present: No
 
Miscellaneous Documentation
Attending Case Discussed With:
RACQUEL ARRIAGA MD
 
Primary Care Physician:
SHENG CR MD
 
Patient sees these Specialists
dr navi gonzalez
Level of Patient Care: General Medicine
 
 
RACQUEL ARRIAGA 17 1340:
Attending MD Review Statement
 
Attending Statement
Attending MD Statement: examined this patient, discuss w/resident/PA/NP, agreed 
w/resident/PA/NP, discussed with family, reviewed EMR data (avail), discussed 
with nursing, discussed with case mgmt, reviewed images, amended to note
Attending Assessment/Plan:
Patient being admitted for left hip fracture for repair. Pain control, hold 
pradaxa, orthopedis consult, gi/dvt prophyalxis, full code plan of care d/wed 
patient and family bedside.

## 2017-05-17 NOTE — CT SCAN REPORT
EXAMINATION:
CT HEAD WITHOUT CONTRAST
 
CLINICAL INFORMATION:
One projects a. Fall. Hit head. Assess for intracranial bleed.
 
COMPARISON:
CT scan of the head 04/14/2017.
 
TECHNIQUE:
Contiguous axial imaging was performed from the skull base to vertex without
intravenous administration of contrast.
 
DLP:
617.42 mGy-cm
 
FINDINGS:
There is no evidence of acute intracranial hemorrhage or territorial
infarction. No abnormal mass effect or midline shift is seen. Gray to white
matter differentiation is well preserved. No extra-axial fluid collections
are identified.
 
The ventricles are normal in size. There are scattered areas of low
attenuation in the periventricular and subcortical white matter, consistent
with chronic microvascular ischemic changes. There are no acute osseous
findings. There is hyperostosis frontalis interna. An extra-axial
well-defined partially calcified lesion in the high left frontal region
medially which measures 1.1 cm may be consistent with a meningioma. There is
no significant mass effect on the adjacent brain parenchyma. There are
degenerative changes at the left temporomandibular joint. There have been
bilateral lens extractions. There are no large scalp contusions or hematomas.
The mastoid air cells and visualized portions of the paranasal sinuses are
well aerated.
 
IMPRESSION:
1. There are no intracranial bleeds or territorial infarcts.
 
2. There is likely a meningioma in the high right frontal region medially,
unchanged.
 
3. There are changes consistent with chronic microvascular ischemic disease.

## 2017-05-18 VITALS — DIASTOLIC BLOOD PRESSURE: 60 MMHG | SYSTOLIC BLOOD PRESSURE: 112 MMHG

## 2017-05-18 VITALS — SYSTOLIC BLOOD PRESSURE: 110 MMHG | DIASTOLIC BLOOD PRESSURE: 60 MMHG

## 2017-05-18 LAB
ABSOLUTE GRANULOCYTE CT: 3.8 /CUMM (ref 1.4–6.5)
BASOPHILS # BLD: 0 /CUMM (ref 0–0.2)
BASOPHILS NFR BLD: 0.6 % (ref 0–2)
EOSINOPHIL # BLD: 0.2 /CUMM (ref 0–0.7)
EOSINOPHIL NFR BLD: 2.8 % (ref 0–5)
ERYTHROCYTE [DISTWIDTH] IN BLOOD BY AUTOMATED COUNT: 13.9 % (ref 11.5–14.5)
GRANULOCYTES NFR BLD: 56.7 % (ref 42.2–75.2)
HCT VFR BLD CALC: 38.2 % (ref 37–47)
LYMPHOCYTES # BLD: 2 /CUMM (ref 1.2–3.4)
MCH RBC QN AUTO: 33.2 PG (ref 27–31)
MCHC RBC AUTO-ENTMCNC: 33.7 G/DL (ref 33–37)
MCV RBC AUTO: 98.6 FL (ref 81–99)
MONOCYTES # BLD: 0.7 /CUMM (ref 0.1–0.6)
PLATELET # BLD: 177 /CUMM (ref 130–400)
PMV BLD AUTO: 9.3 FL (ref 7.4–10.4)
RED BLOOD CELL CT: 3.88 /CUMM (ref 4.2–5.4)
WBC # BLD AUTO: 6.7 /CUMM (ref 4.8–10.8)

## 2017-05-18 NOTE — CONS- CARDIOLOGY
General Information and HPI
 
Consulting Request
Date of Consult: 17
Requested By:
RACQUEL ARRIAGA MD
 
Reason for Consult:
Preop cardiac clearace for left hip repair.
Source of Information: patient, old records
Exam Limitations: no limitations
History of Present Illness:
THIS IS A 81-year-old woman with a past medical history of chronic atrial 
fibrillation (anticoagulated with Pradaxa), sick sinus syndrome, status post 
pacemaker implantation  2 years back, hypothyroidism and COPD on home oxygen as 
weLL all, pulmonary hypertension with RV pressures greater than 50 on the last 
echo in , who presented to the [facility] after she had a mechanical 
fall while trying to ambulate with her walker.
As per the patient she was trying to get out of the bed and her walker caught 
tangled in the oxygen tubing(FOR her ).  The patient slightly twisted her
leg and landed up on her left hip.  The patient never lost consciousness, denied
any dizziness, palpitations, chest pain prior to the fall.
 
The above HPI was obtained by the admitting medical personnel.  Patient confirms
the above.  She denies any chest pains or unusual shortness of breath over the 
past 2 weeks.  Her functional activity was greater than 4 mets doing household 
chores and even vacuum cleaning.  She has chronic neck pain related to a fall 
but this is continuous and related more to cervical injury.
 
Allergies/Medications
Allergies:
Coded Allergies:
coconut oil (ITCHING ALL OVER  16)
codeine ("MY BP GOES WAY DOWN" 16)
 
Home Med List:
Dabigatran Etexilate Mesylate (Pradaxa 150 MG) 150 MG CAPSULE   1 CAP PO BID 
BLOOD THINNER  (Reported)
Furosemide 40 MG TABLET   1 TAB PO DAILY CHF  (Reported)
Levothyroxine Sodium (Synthroid) 175 MCG TABLET   1 TAB PO DAILY AC THYROID  (
Reported)
Losartan Potassium (Cozaar) 25 MG TABLET   1 TAB PO DAILY BP  (Reported)
Metoprolol Succinate 50 MG TAB.ER.24H   1 TAB PO DAILY BP  (Reported)
Multivitamin (Multi-Day Vitamins) 1 EACH TABLET   1 TAB PO DAILY SUPPLEMENT  (
Reported)
Paroxetine HCl 40 MG TABLET   1 TAB PO DAILY DEPRESSION  (Reported)
Pravastatin Sodium 10 MG TABLET   1 TAB PO DAILY CHOLESTEROL  (Reported)
Vit A,C & E/Lutein/Minerals (Ocuvite With Lutein Tablet) 1 EACH TABLET   1 TAB 
PO DAILY SUPPLEMENT  (Reported)
 
Current Medications:
 Current Medications
 
 
  Sig/Shauna Start time  Last
 
Medication Dose Route Stop Time Status Admin
 
Acetaminophen 0 .STK-MED ONE  1305 DC 
 
  IV   
 
Acetaminophen 650 MG Q6P PRN  1300 AC 
 
  PO   1957
 
Acetaminophen 1,000 MG ONCE ONE  1300 DC 
 
N/A 1 UNIT IV  1314  1304
 
Furosemide 40 MG DAILY  1000 AC 
 
  PO   
 
Heparin Sodium  5,000 UNIT Q8  2200 AC 
 
(Porcine)  SC   0552
 
Ketorolac  15 MG Q6P PRN  1300 AC 
 
Tromethamine  IV  1259  2234
 
Levothyroxine Sodium 0.175 MG DAILY AC  0700 AC 
 
  PO   0552
 
Losartan Potassium 25 MG DAILY  1000 AC 
 
  PO   
 
Metoprolol Succinate 50 MG DAILY  1000 AC 
 
  PO   
 
Morphine Sulfate 1 MG ONCE ONE  2345 DC 
 
  IV  2346  0013
 
Morphine Sulfate 2 MG Q6P PRN  1415 AC 
 
  IV   2048
 
Morphine Sulfate 2 MG ONCE ONE  1215 DC 
 
  IV  1216  1218
 
Morphine Sulfate 0 .STK-MED ONE  1152 DC 
 
  .ROUTE   
 
Morphine Sulfate 1 MG ONCE ONE  1145 DC 
 
  IV  1146  1216
 
Morphine Sulfate 2 MG ONCE ONE  1030 DC 
 
  IV  1031  1039
 
Oxycodone/ 2 TAB Q6P PRN  1300 DC 
 
Acetaminophen  PO   
 
Paroxetine HCl 40 MG DAILY  1000 AC 
 
  PO   
 
Pravastatin Sodium 10 MG 1700  1700 AC 
 
  PO   
 
 
 
 
Review of Systems
 
Review of Systems
Constitutional:
Denies: no symptoms. 
Cardiovascular:
Denies: no symptoms. 
Respiratory:
Reports: see HPI. 
GI:
Denies: no symptoms. 
Genitourinary:
Denies: no symptoms. 
Musculoskeletal:
Reports: neck pain. 
Skin:
Denies: no symptoms. 
Neurological/Psychological:
Denies: no symptoms. 
Hematologic/Endocrine:
Denies: no symptoms. 
Immunologic/Allergic:
Denies: no symptoms. 
 
Past History
 
Travel History
Traveled to [place] past 21 day No
 
Medical History
Blood Transfusion Hx: No
Neurological: NONE
EENT: NONE
Cardiovascular: AFIB, CHF, hypertension, hyperlipidemia, PACEMAKER CAROTID 
STENTS PER PT PACEMAKER pulmonary htn
Respiratory: COPD
Gastrointestinal: NONE
Hepatic: NONE
Renal: NONE
Musculoskeletal: NONE
Psychiatric: NONE
Endocrine: NONE
Blood Disorders: NONE
Cancer(s): breast cancer
GYN/Reproductive: NONE
 
Surgical History
Surgical History: none (Right CEA/ Appendectomy and ch), STAUS POST 
THYROIDECTOMY s/p left ankle orif
 
Family History
Relations & Conditions If Any:
FATHER, .
SISTER
MOTHER
MOTHER
Relation not specified for:
  FH: CHF (congestive heart failure)
  FH: hypertension
  FH: kidney disease
 
 
Psychosocial History
Services at Home: None
Primary Language: English
Smoking Status: Never Smoked
ETOH Use: occasional use
 
Functional Ability
Ambulation: walker
 
ECHO Results (as available)
Date of last Echo 16
Report:
Normal left ventricle systolic function with ejection fraction greater than 55%.
 Moderate biatrial enlargement.  Moderate mitral regurgitation posteriorly 
directed.  Right ventricular systolic pressure of 38 mmHg.
 
Exam & Diagnostic Data
Vital Signs and I&O
Vital Signs
 
 
Date Time Temp Pulse Resp B/P B/P Pulse O2 O2 Flow FiO2
 
     Mean Ox Delivery Rate 
 
 0704 97.9 73 20 110/60  90   
 
 2236 98.8 61 20 112/60  90 Room Air  
 
 1457 97.5 89 20 122/80  98   
 
 1402  84 18 140/74  93 Room Air  
 
 1218 98.4 78 18 162/70  93 Room Air  
 
 1040      93   
 
 1013 97.9 85 20 178/107  93 Room Air  
 
 
 Intake & Output
 
 
  1600  0800  0000  1600  0800  0000
 
Intake Total  240  10  
 
Output Total  350 350 250  
 
Balance  -110 -350 -240  
 
       
 
Intake, IV    10  
 
Intake, Oral  240    
 
Output, Urine  350 350 250  
 
Patient    226 lb  
 
Weight      
 
Weight    Reported by Patient  
 
Measurement      
 
Method      
 
 
 
Physical Exam:
General exam patient was comfortable lying in bed.
Head normocephalic atraumatic
Eyes sclera anicteric conjunctiva showed no pallor extraocular muscles were 
normal
Neck no jugular venous distention.  Right carotid endarterectomy scar noted
Chest lungs were clear bilaterally
Heart regular rhythm without murmurs.  S2 was physiologically split
Abdomen, soft nontender no organomegaly scars of previous surgery noted
Extremities no clubbing cyanosis.   Significant varicosities bilaterally.  No 
calf tenderness.
Neurological no gross motor or sensory deficits
Labs/Kar Results:
 Laboratory Tests
 
 
 
 
 0620 1030
 
Chemistry  
 
  Sodium (137 - 145 mmol/L) Pending 137
 
  Potassium (3.5 - 5.1 mmol/L) Pending 4.1
 
  Chloride (98 - 107 mmol/L) Pending 96  L
 
  Carbon Dioxide (22 - 30 mmol/L) Pending 30
 
  Anion Gap (5 - 16) Pending 11
 
  BUN (7 - 17 mg/dL) Pending 13
 
  Creatinine (0.5 - 1.0 mg/dL) Pending 0.6
 
  Estimated GFR (>60 ml/min)  > 60
 
  BUN/Creatinine Ratio (7 - 25 %) Pending 21.7
 
  Glucose (65 - 99 mg/dL)  104  H
 
  Calcium (8.4 - 10.2 mg/dL)  9.5
 
  Total Bilirubin (0.2 - 1.3 mg/dL)  0.7
 
  AST (14 - 36 U/L)  32
 
  ALT (9 - 52 U/L)  34
 
  Alkaline Phosphatase (<127 U/L)  101
 
  Troponin I (< 0.11 ng/ml)  < 0.01
 
  Total Protein (6.3 - 8.2 g/dL)  7.2
 
  Albumin (3.5 - 5.0 g/dL)  4.0
 
  Globulin (1.9 - 4.2 gm/dL)  3.2
 
  Albumin/Globulin Ratio (1.1 - 2.2 %)  1.3
 
Coagulation  
 
  PT (9.4 - 12.5 SEC)  13.5  H
 
  INR (0.90 - 1.19)  1.29  H
 
  APTT (25 - 37 SEC)  51  H
 
Hematology  
 
  CBC w Diff Pending NO MAN DIFF REQ
 
  WBC (4.8 - 10.8 /CUMM) Pending 7.2
 
  RBC (4.20 - 5.40 /CUMM) Pending 4.48
 
  Hgb (12.0 - 16.0 G/DL) Pending 14.7
 
  Hct (37 - 47 %) Pending 43.9
 
  MCV (81.0 - 99.0 FL) Pending 98.1
 
  MCH (27.0 - 31.0 PG) Pending 32.9  H
 
  RDW (11.5 - 14.5 %) Pending 14.0
 
  Plt Count (130 - 400 /CUMM) Pending 201
 
  MPV (7.4 - 10.4 FL) Pending 8.9
 
  Gran % (42.2 - 75.2 %)  58.8
 
  Lymphocytes % (20.5 - 51.1 %)  26.7
 
  Monocytes % (1.7 - 9.3 %)  10.8  H
 
  Eosinophils % (0 - 5 %)  3.4
 
  Basophils % (0.0 - 2.0 %)  0.3
 
  Absolute Granulocytes (1.4 - 6.5 /CUMM)  4.2
 
  Absolute Lymphocytes (1.2 - 3.4 /CUMM)  1.9
 
  Absolute Monocytes (0.10 - 0.60 /CUMM)  0.8  H
 
  Absolute Eosinophils (0.0 - 0.7 /CUMM)  0.2
 
  Absolute Basophils (0.0 - 0.2 /CUMM)  0
 
  PUBS MCHC (33.0 - 37.0 G/DL) Pending 33.5
 
 
 
 
Diagnostic Data
EKG Results
Underlying atrial fibrillation.  Ventricular paced rhythm with VVI mode.
CXR Results
MPRESSION:
- Cardiomegaly without pulmonary edema.
- No acute cardiopulmonary disease compared to 2016
 
Assessment/Plan
Assessment/Plan
In summary this 81-year-old female was admitted with a fracture of the left hip 
related to a mechanical fall.  Cardiac clearance was suggested.
She has the following problems
#1.  Persistent atrial fibrillation with advanced AV block and insertion of a 
permanent pacemaker.
#2.  On oral anticoagulation with thrombin inhibitor therapy Dabigratan.
#3.  Hypertension
#4.  Hyperlipidemia
#5.  Status post stenting of an aortic aneurysm
#6.  Right carotid artery stenting
#7.  Varicose veins
#8.  Previous history of diastolic congestive heart failure
#9.  Moderate mitral regurgitation with normal left ventricle ejection fraction 
and right ventricle systolic pressure of 38 mm mini  mercury on last 
echocardiogram done May 2016.  Lexiscan nuclear stress test was performed in 
 that was negative for ischemia with left ventricular ejection fraction of 
54%
 
Based on her satisfactory cardiac functional capacity, normal chest x-ray not 
showing evidence of congestive heart failure, satisfactory vital signs, she is 
cleared with average cardiac risk to proceed with left hip repair.  She's 
currently on Pradaxa and this is to be withheld at least 48 hours prior to 
surgery.  Please do not go by PT INR as this is always elevated with the usage 
of Pradaxa.  After surgery reinitiation of Pradaxa may be done when it is 
appropriate from a bleeding standpoint and from an orthopedic standpoint.  
However this patient has a high CHADSVASC score of 6-7, and I would reinitiate 
her anticoagulation as soon as possible after surgery.  Bridging with heparin is
not necessary as the half life of Lovenox and Pradaxa about the same.
 
 
Consult Acknowledgment
- Thank you for your consult request.

## 2017-05-18 NOTE — PN- HOUSESTAFF
IGOR WHITT,WILBER 17 0701:
Subjective
Follow-up For:
hip fracture 
Subjective:
pt was seen today, was lying in bed, having a coughing fit. she reports no pain 
from the hip. 
 
I saw the patient with Dr. Sami Rivero, and he has cleared her for surgery, 
that is planned for tomorrow. 
 
Pt has not had BM since admission, given that she is on opioids for pain control
, bowel regimen ordered. 
 
underwood continued 
 
Review of Systems
Constitutional:
Reports: see HPI. 
 
Objective
Last 24 Hrs of Vital Signs/I&O
 Vital Signs
 
 
Date Time Temp Pulse Resp B/P B/P Pulse O2 O2 Flow FiO2
 
     Mean Ox Delivery Rate 
 
 0946  74  116/76     
 
 0946  73  116/76     
 
 0704 97.9 73 20 110/60  90   
 
 2236 98.8 61 20 112/60  90 Room Air  
 
 1457 97.5 89 20 122/80  98   
 
 1402  84 18 140/74  93 Room Air  
 
 1218 98.4 78 18 162/70  93 Room Air  
 
 
 Intake & Output
 
 
  1600  0800  0000
 
Intake Total  240 
 
Output Total  350 350
 
Balance  -110 -350
 
    
 
Intake, Oral  240 
 
Output, Urine  350 350
 
 
 
 
Physical Exam
General Appearance: Alert, Oriented X3, Cooperative, No Acute Distress
HEENT: Atraumatic
Neck: Supple, No JVD, +2 Carotid Pulse wo Bruit
Cardiovascular: irregularly irregular rate, rate controlled 
Lungs: Clear to Auscultation, Normal Air Movement
Abdomen: Normal Bowel Sounds, Soft, No Tenderness
Extremities: No Edema
Vascular: Normal Pulses
Current Medications:
 Current Medications
 
 
  Sig/Shauna Start time  Last
 
Medication Dose Route Stop Time Status Admin
 
Acetaminophen 0 .STK-MED ONE  1305 DC 
 
  IV   
 
Acetaminophen 650 MG Q6P PRN  1300 AC 
 
  PO   0945
 
Acetaminophen 1,000 MG ONCE ONE  1300 DC 
 
N/A 1 UNIT IV  1314  1304
 
Docusate Sodium 100 MG BID  1024 AC 
 
  PO   
 
Furosemide 40 MG DAILY  1000 AC 
 
  PO   0945
 
Heparin Sodium  5,000 UNIT Q8  2200 AC 
 
(Porcine)  SC  2300  0552
 
Ketorolac  15 MG Q6P PRN  1300 AC 
 
Tromethamine  IV  1259  1119
 
Levothyroxine Sodium 0.175 MG DAILY AC  0700 AC 
 
  PO   0552
 
Losartan Potassium 25 MG DAILY  1000 AC 
 
  PO   0946
 
Metoprolol Succinate 50 MG DAILY  1000 AC 
 
  PO   0946
 
Morphine Sulfate 2 MG Q6P PRN  1030 CAN 
 
  IV   
 
Morphine Sulfate 10 MG .STK-MED ONE  0011 DC 
 
  IM  0012  
 
Morphine Sulfate 1 MG ONCE ONE  2345 DC 
 
  IV  2346  0013
 
Morphine Sulfate 2 MG Q6P PRN  1415 AC 
 
  IV   2048
 
Morphine Sulfate 2 MG ONCE ONE  1215 DC 
 
  IV  1216  1218
 
Morphine Sulfate 0 .STK-MED ONE  1152 DC 
 
  .ROUTE   
 
Oxycodone/ 2 TAB Q6P PRN  1300 DC 
 
Acetaminophen  PO   
 
Paroxetine HCl 40 MG DAILY  1000 AC 
 
  PO   0945
 
Polyethylene Glycol 17 GM DAILY  1024 AC 
 
  PO   
 
Pravastatin Sodium 10 MG 1700  1700 AC 
 
  PO   
 
Senna 187 MG AT BEDTIME  2200 AC 
 
  PO   
 
 
 
 
Last 24 Hrs of Lab/Kar Results
Last 24 Hrs of Labs/Mics:
 Laboratory Tests
 
17 0620:
Anion Gap 9, Estimated GFR > 60, BUN/Creatinine Ratio 20.0, CBC w Diff NO MAN 
DIFF REQ, RBC 3.88  L, MCV 98.6, MCH 33.2  H, RDW 13.9, MPV 9.3, Gran % 56.7, 
Lymphocytes % 29.3, Monocytes % 10.6  H, Eosinophils % 2.8, Basophils % 0.6, 
Absolute Granulocytes 3.8, Absolute Lymphocytes 2.0, Absolute Monocytes 0.7  H, 
Absolute Eosinophils 0.2, Absolute Basophils 0, PUBS MCHC 33.7
 Microbiology
 1225  URINE ROUT: Urine Culture - RES
 
 
 
Assessment/Plan
Assessment:
81-year-old with PMH of atrial fibrillation  on pradaxa, sick sinus s/p 
pacemaker, hypothyroidism, oxygen dependent COPD, pulmonary hypertension. 
 
# Left intertrochanteric fracture
- last pradaxa taken  am
* bowel regimen miralax senna colace
* pain control tylenol po 650 q6p, toradol iv 15 q6p, morphine 1 mg q6p
* plan for surgery with Dr. Huerta 17
* hold pradaxa before surgery, no need for heparin bridging 
* sc heparin to be discontinued after tonight's dose, will resume pradaxa after 
surgery
* appreciate cardio (Dr Sami Rivero) and ortho input 
* continue underwood 
* PT after surgery 
 
# Atrial fibrillation  on pradaxa, sick sinus s/p pacemaker, hypothyroidism, 
oxygen dependent COPD
* continue home meds pravachol, paxil, metoprolol, losartan, lasix, synthroid 
 
Diet: heart healthy
DVT prophylaxis with S/q Heaprin
FC
 
Problem List:
 1. Intertrochanteric fracture of left hip
 
Pain Ratin
Pain Location:
none
Pain Goal: Pain 4 or less
Pain Plan:
morphine
toradol 
Tomorrow's Labs & Rationales:
cbc and bep pre op 
DVT/Prophylaxis: mechanical, pharmacological
 
 
RACQUEL ARRIAGA 17 1107:
Attending MD Review Statement
 
Attending Statement
Attending MD Statement: examined this patient, discuss w/resident/PA/NP, agreed 
w/resident/PA/NP, discussed with family, reviewed EMR data (avail), discussed 
with nursing, discussed with case mgmt, reviewed images, amended to note
Attending Assessment/Plan:
Patient being admitted for left hip fracture for repair. Pain control, held 
pradaxa, orthopedics and cardiology consulted, gi/dvt prophyalxis, full code 
plan of care d/wed patient and family bedside. Patient medically stable for 
procedure. Cardiology and ortho f/u for a/c resume as soon as possible.

## 2017-05-18 NOTE — PN- ORTHOPEDIC
Subjective
Subjective:
No known left hip pain unless she moves
 
Objective
Vital Signs and I&Os
Vital Signs
 
 
Date Time Temp Pulse Resp B/P B/P Pulse O2 O2 Flow FiO2
 
     Mean Ox Delivery Rate 
 
05/18 0704 97.9 73 20 110/60  90   
 
05/17 2236 98.8 61 20 112/60  90 Room Air  
 
05/17 1457 97.5 89 20 122/80  98   
 
05/17 1402  84 18 140/74  93 Room Air  
 
05/17 1218 98.4 78 18 162/70  93 Room Air  
 
05/17 1040      93   
 
05/17 1013 97.9 85 20 178/107  93 Room Air  
 
 
 Intake & Output
 
 
 05/18 1600 05/18 0800 05/18 0000 05/17 1600 05/17 0800 05/17 0000
 
Intake Total  240  10  
 
Output Total  350 350 250  
 
Balance  -110 -350 -240  
 
       
 
Intake, IV    10  
 
Intake, Oral  240    
 
Output, Urine  350 350 250  
 
Patient    226 lb  
 
Weight      
 
Weight    Reported by Patient  
 
Measurement      
 
Method      
 
 
 
Physical Exam:
Well-developed well-nourished no apparent distress.
HEENT: Atraumatic,
Respiratory: No respiratory distress
 
Extremities: No edema, no calf pain. left lower extremity shortened and rotated 
externally.  Neurovascularly intact distally 
Neuro: Alert and oriented x3
Psych: Mood affect normal, normal memory normal judgment.
Skin: Warm and dry, no rash on exposed skin
 
Assessment/Plan
Assessment/Plan
Left hip intertrochanteric fracture, requires IM nailing.  Per medicine and 
cardiology, would ideally wait until tomorrow due to Pradaxa, otherwise she is 
cleared.  Plan for surgery tomorrow, discussed with patient, understands and 
agrees with plan, nothing by mouth after midnight.  
 
 
Core Measures/Miscellaneous
 
Venous Thromboembolism
VTE Risk Factors: Age > 40, Surgery
VTE Contraindications: No Contraindications (-currently pre op)
VTE Diagnosis: No
VTE Type: NONE
VTE Confirmed by (Test): NONE
 
Beta Blocker
Is Beta Blocker a Home Med? No
 
Antibiotics
Is Patient on Antibiotics? No

## 2017-05-19 VITALS — DIASTOLIC BLOOD PRESSURE: 60 MMHG | SYSTOLIC BLOOD PRESSURE: 108 MMHG

## 2017-05-19 VITALS — SYSTOLIC BLOOD PRESSURE: 82 MMHG | DIASTOLIC BLOOD PRESSURE: 40 MMHG

## 2017-05-19 VITALS — DIASTOLIC BLOOD PRESSURE: 40 MMHG | SYSTOLIC BLOOD PRESSURE: 74 MMHG

## 2017-05-19 VITALS — SYSTOLIC BLOOD PRESSURE: 86 MMHG | DIASTOLIC BLOOD PRESSURE: 49 MMHG

## 2017-05-19 VITALS — SYSTOLIC BLOOD PRESSURE: 134 MMHG | DIASTOLIC BLOOD PRESSURE: 82 MMHG

## 2017-05-19 VITALS — DIASTOLIC BLOOD PRESSURE: 42 MMHG | SYSTOLIC BLOOD PRESSURE: 86 MMHG

## 2017-05-19 LAB
ABSOLUTE GRANULOCYTE CT: 13.4 /CUMM (ref 1.4–6.5)
ABSOLUTE GRANULOCYTE CT: 4.7 /CUMM (ref 1.4–6.5)
BASOPHILS # BLD: 0 /CUMM (ref 0–0.2)
BASOPHILS # BLD: 0 /CUMM (ref 0–0.2)
BASOPHILS NFR BLD: 0.2 % (ref 0–2)
BASOPHILS NFR BLD: 0.5 % (ref 0–2)
EOSINOPHIL # BLD: 0 /CUMM (ref 0–0.7)
EOSINOPHIL # BLD: 0.3 /CUMM (ref 0–0.7)
EOSINOPHIL NFR BLD: 0.1 % (ref 0–5)
EOSINOPHIL NFR BLD: 3.4 % (ref 0–5)
ERYTHROCYTE [DISTWIDTH] IN BLOOD BY AUTOMATED COUNT: 13.8 % (ref 11.5–14.5)
ERYTHROCYTE [DISTWIDTH] IN BLOOD BY AUTOMATED COUNT: 15.6 % (ref 11.5–14.5)
GRANULOCYTES NFR BLD: 58.8 % (ref 42.2–75.2)
GRANULOCYTES NFR BLD: 87.3 % (ref 42.2–75.2)
HCT VFR BLD CALC: 34.1 % (ref 37–47)
HCT VFR BLD CALC: 41.9 % (ref 37–47)
LYMPHOCYTES # BLD: 1.2 /CUMM (ref 1.2–3.4)
LYMPHOCYTES # BLD: 2.1 /CUMM (ref 1.2–3.4)
MCH RBC QN AUTO: 32.4 PG (ref 27–31)
MCH RBC QN AUTO: 33.1 PG (ref 27–31)
MCHC RBC AUTO-ENTMCNC: 33.6 G/DL (ref 33–37)
MCHC RBC AUTO-ENTMCNC: 33.8 G/DL (ref 33–37)
MCV RBC AUTO: 95.9 FL (ref 81–99)
MCV RBC AUTO: 98.5 FL (ref 81–99)
MONOCYTES # BLD: 0.7 /CUMM (ref 0.1–0.6)
MONOCYTES # BLD: 0.8 /CUMM (ref 0.1–0.6)
PLATELET # BLD: 145 /CUMM (ref 130–400)
PLATELET # BLD: 155 /CUMM (ref 130–400)
PMV BLD AUTO: 8.9 FL (ref 7.4–10.4)
PMV BLD AUTO: 9 FL (ref 7.4–10.4)
RED BLOOD CELL CT: 3.46 /CUMM (ref 4.2–5.4)
RED BLOOD CELL CT: 4.38 /CUMM (ref 4.2–5.4)
WBC # BLD AUTO: 15.3 /CUMM (ref 4.8–10.8)
WBC # BLD AUTO: 8 /CUMM (ref 4.8–10.8)

## 2017-05-19 PROCEDURE — 0QS706Z REPOSITION LEFT UPPER FEMUR WITH INTRAMEDULLARY INTERNAL FIXATION DEVICE, OPEN APPROACH: ICD-10-PCS | Performed by: ORTHOPAEDIC SURGERY

## 2017-05-19 PROCEDURE — 0QS704Z REPOSITION LEFT UPPER FEMUR WITH INTERNAL FIXATION DEVICE, OPEN APPROACH: ICD-10-PCS | Performed by: ORTHOPAEDIC SURGERY

## 2017-05-19 NOTE — OPERATIVE REPORT
Operative/Inv Procedure Report
Surgery Date: 05/19/17
Name of Procedure:
Repair left comminuted intertrochanteric-subtrochanteric proximal femur fracture
with long cephalo-medullary nail.
Pre-Operative Diagnosis:
Comminuted left proximal femoral intertrochanteric-subtrochanteric fracture.
Post-Operative Diagnosis:
Same.
Estimated Blood Loss: 1000 cc
Surgeon/Assistant:
JOSE F MEJIA/ JOSE F GAITAN
 
Anesthesia: general endotracheal tube
Monitors:
EKG/blood pressure/oxygen saturation
IV Fluids:
700 mL crystalloid +3 units packed red blood cells.
Implants:
Rudd and Nephew left long Trigen Intertan nail components:
1) 11.5 mm diameter X 36 cm length 130 Intertan nail
2) Trigen Intertan 105/100 mm integrated compression screws
3) 5 mm diameter distal locking bolts x 2.
Urine Output:
400 mL.
Drains:
None.
Specimens:
None.
Microbiology:
None.
Tourniquet:
None.
Complications:
None known.
Condition:
Stable.
Operative Indication:
The patient is an 81-year-old female who sustained a mechanical trip and fall 
while walking with her walker, tripping over her 's oxygen line 
sustaining a displaced unstable comminuted left proximal femur fracture.  The 
patient was admitted to the hospital initially to the medical service.  She was 
medically evaluated and optimized and ultimately cleared for surgery.  We did 
need to hold off on moving forward with surgery due to use of anticoagulant 
medication.  We planned on surgical intervention today and the patient remained 
stable during her workup on the medical service and we were able to ultimately 
proceed with surgery as planned today.
 
The risks and benefits and expected outcomes of nonoperative management of the 
patient's fracture were discussed and discouraged.  We did discuss the same with
respect to surgical intervention with placement of a long cephalo-medullary 
nail.  The patient and her family were advised of the probability based upon the
fracture anatomy and location that I would need to open the fracture site 
directly to get any kind of meaningful fracture reduction for placement of the 
hardware, including the intramedullary nail component of the cephalo-medullary 
nail to repair the fracture.  All the patient's questions and her family's 
questions were answered at length.  They did wish to move forward with surgery 
as was recommended for management of the fracture and surgical consent was 
obtained.
 
Operative/Procedure Note
Note:
Procedure:  The patient was brought to the operating room on her stretcher.  
Once in the operating room general endotracheal anesthesia was induced on the 
stretcher.  The patient was then transferred to the OR fracture table and 
initially positioned supine.  A dose of IV antibiotics were given for infection 
prophylaxis.  The patient was next positioned on the OR fracture table in 
standard fashion for left hip trochanteric region repair.  This did include 
placing the perineum down against a very well-padded perineal post.  The 
affected left foot and ankle were placed into the foam padding for the traction 
boot.  The foam padding was then wrapped with Coban to increase friction and to 
compress the foam padding tightly about the foot and the ankle.  The foot and 
ankle were then secured into the boot of the traction arm of the fracture table.
 The contralateral unaffected right lower extremity was elevated into a position
of flexion and abduction and external rotation through the hip with the 
extremity supported on the leg in a very well-padded leg cantrell.  The patient's 
pannus in this case was taped widely with multiple wide strips of tape to pull 
the pannus out of the operative field.
 
Once the patient was positioned on the OR fracture table the C-arm was brought 
in and an attempt was made to reduce the proximal femur fracture with 
manipulation of the extremity distal to the fracture using the traction arm 
through the traction boot.  In this case despite trying varying degrees of 
longitudinal traction and varying degrees of rotation and varying degrees of hip
adduction and abduction through the traction arm all with and without use of a 
crutch to support the proximal femur I determined I was unable to achieve a 
satisfactory reduction of the proximal femur fracture.  There was definitely 
comminution in place here and I was unable to reduce the fracture 
satisfactorily.  I therefore made plans to immediately open the subtrochanteric-
intertrochanteric region of the proximal femur via a lateral approach with the 
hopes that I would be able to get a satisfactory reduction of the major fracture
fragments with the fracture opened for direct manipulation manually with 
appropriate retractors and reduction instrumentation. 
 
The left lower extremity was then prepped and draped in the usual sterile 
fashion with prepping proximally above the level of the superior margin of the 
iliac crest and extending distally to the mid leg.  The C-arm was brought in and
I used a Ledesma elevator placed over the anterior skin to help localize the level 
of the tip of the greater trochanter as well as the level of the 
intertrochanteric and subtrochanteric components of the proximal femur fracture.
 These were marked on the skin for reference.  I next created a longitudinal 
skin incision more or less initially centered over the subtrochanteric segment 
of the fracture and approached the bone through a direct lateral approach.  This
did include incising the skin longitudinally with a scalpel.  After that sharp 
dissection was with deep scalpels.  The patient's adipose tissue layer was 
markedly deep, I would estimate more than 6-7 inches deep.  This did make 
visualization very difficult overall and this did require me to further extend 
the incision proximally and distally.  Retractors were placed progressively 
deeper as we went along.  We eventually encountered the fascia radha which was 
divided longitudinally in line with the skin incision.  Retractors were placed 
deeper area that I did not feel that I would be able to reflect the vastus 
lateralis muscle in subperiosteal fashion.  I therefore went directly sharply 
through the vastus lateralis muscle with a scalpel.  After that I did strip the 
lateral aspect of the proximal femur with a Ledesma elevator for visualization.  I 
next attempted to gain some control of the major fracture fragments.  
Unfortunately the fracture proved to be so markedly comminuted with bone that 
was so markedly frail secondary to osteoporosis that I could not get reduction 
clamps to hold on the bone without further fragmenting the bone fragments.  It 
became increasingly evident that the fracture was markedly comminuted with 
involvement of essentially the entire intertrochanteric region of the proximal 
femur extending up through the tip of the greater trochanter and extending 
distally to knock off the lesser trochanter.  Additional comminuted bone was 
with a transverse subtrochanteric fracture line.  The greater trochanteric 
fracture was split in coronal and sagittal planes into multiple fragments.  The 
comminution was so extensive that there were multiple loose bone fragments that 
had been entirely stripped of soft tissue attachments.  These bone fragments 
were removed where possible to give a better chance of reducing the bone and not
getting hung up on the bone fragments.  In the end there was definitely a 
moderate degree of bone loss due to the loose bone fragments that needed to be 
removed.  This was in addition to the marked comminution with fracture fragments
that were retained.  Even with the fracture opened I was unable to achieve 
satisfactory open reduction of the proximal femur fracture due to the issues 
above.  I therefore decided to try to start the process to place a nail down 
through the general region of the tip of the greater trochanter.  I did 
definitely understand that I would not be relying on any of the bone of the 
proximal femur for stabilization of the fracture and instead that the head and 
neck fracture fragment would be stabilized by the screws in the head and neck as
they engaged the intramedullary nail component of the cephalo-medullary nail.  
With that in mind I did create a separate longitudinal skin incision several 
centimeters proximal to the level of the tip of the greater trochanter in order 
to be able to have an angle of attack to place the guidepin and entry reamer and
ultimately the nail.  We repeated the same process of sharp dissection through 
the skin and subcutaneous tissues as well as the fascia over the proximal 
gluteal musculature.  After that dissection through the gluteal musculature was 
bluntly with fingertip dissection.  I did palpate the tip of the greater 
trochanter digitally but again this was completely unstable and markedly 
comminuted.  I attempted to place the guidepin for the entry reamer into the 
proper position.  This proved to be quite difficult once again due to the 
fracture comminution involving the upper aspect of the greater trochanter as 
well.  In order to facilitate placement of the guidepin I did need ultimately to
connect the previously made 2 skin incisions with soft tissue dissection and 
fascial and muscle dissection together to make one larger wound.  I did 
ultimately place a guidewire for the entry reamer into the general position 
where I thought we needed to start the reamer.  The soft tissue protector was 
placed down over the guidewire under fluoroscopic control and seated down to the
general level of the tip of the greater trochanter.  I next ran the entry reamer
under fluoroscopic control through the proximal femur.  This was almost not 
necessary at all as the fracture was so comminuted that fracture fragments were 
spinning out of the way with passage of the entry reamer.  The entry reamer and 
proximal guidepin were removed and we next placed a long ball-tipped guidewire 
down through the general region of the tip of the greater trochanter as opposed 
to a distinct opening hole in the tip of the greater trochanter made with the 
reamer.  Under direct visualization I did advance that distally and with 
manipulation of fracture fragments I was able to pass the guidewire into the 
proximal femoral shaft and advanced that down the bone to the level of the 
superior pole of the patella.  We did confirm proper intramedullary localization
of the ball-tipped guidewire in both AP and lateral planes using the 
fluoroscope.
 
With the ball-tipped guidewire now in place and advanced distally to the desired
depth of penetration I now needed to make an estimate as opposed to a true 
measurement for the length of the intramedullary nail component of the cephalo-
medullary nail.  I placed the measuring tool over the ball-tipped guidewire and 
advanced it down to the region of the tip of the greater trochanter under 
fluoroscopic control.  As best as I can determine I measured what I thought 
would be a reasonable length of nail to place into the proximal femur.  In this 
case I elected to go with a 36 cm length for the intramedullary nail.  I did 
also anticipate trying to place an 11.5 mm diameter nail so that I could place a
slightly larger nail due to the marked instability of the fracture and the 
patient's overall poor bone quality and morbid obesity.  We next reamed the bone
in half millimeter increments beginning at a 9 mm reamer and advancing up to a 
13 mm diameter under fluoroscopic control in order to ream the bone wide enough 
to accommodate an 11.5 mm nail.  We did encounter reasonably good "chatter" of 
the medullary bone with the last couple of reamers that were used.  We next 
opened a Smith and Nephew 130 11.5 mm diameter x 36 cm length nail and attached
that to the  handle for the nail.  The nail was then advanced down over 
the guidewire under fluoroscopic control passing through the comminuted 
trochanteric region and then down into the proximal femoral shaft and then 
advanced distally until we felt that we had advance the nail to the appropriate 
depth into the proximal femur based upon the positioning of the holes in the 
proximal aspect of the nail for placement of the integrated compression screws. 
I felt that with the intramedullary nail now in place within the distal femur 
that I would be able to control the distal femur better to get some semblance of
reduction or at least reasonable alignment to place the guidewire for the 
integrated compression screws within the femoral head and neck.  At this point 
as expected this proved to be quite difficult due to the the same issues of bone
loss and marked fracture comminution and instability.  This proved difficult 
enough that I did in this case contact one of my partners, Dr. Gaitan to come 
in as an extra set of skilled hands to help with placement of this guidewire 
while simultaneously controlling multiple fracture fragments and the 
intramedullary estela and the power instruments needed to advance the pin across 
all of the above.  Once Dr. Gaitan was scrubbed in we simultaneously working 
together did ultimately place the guidepin for the integrated compression screws
up through the guide and through the nail (no lateral cortical bone present in 
this area whatsoever) and then up into the femoral head and neck advancing the 
tip of the guidewire to the level of subchondral bone.  This did require 
multiple attempts to get this in optimal position but this was eventually 
achieved.  Once the guidepin for the integrated compression screws was in place 
in satisfactory position we took a depth gauge measurement for the larger of the
2 integrated compression screws.  In this case we elected to go with a 105 mm 
length compression screw.  We reamed out the bone into the femoral head and neck
with the power reamer to allow placement of this compression screw.  We also 
placed an anti-rotation fin in position below this major compression screw while
the compression screw was advanced over the guidewire.  This was done with 
fluoroscopic control until the compression screw was advanced to the appropriate
depth of penetration within the femoral head and neck.  We did have reasonably 
good purchase of the bone within the femoral head and neck by the major 
compression screw.  We next removed the anti-rotation fin and then reamed the 
channel to allow placement of the smaller of the integrated compression screws. 
In this can't this was automatically designed as a 100 mm length smaller 
compression screw.  This was advanced into position at a depth of penetration 
per protocol of 100 mm.  We realized that we could not get true compression of 
the femoral head and neck down to the trochanteric region of the proximal femur 
with the instrumentation for placement and manipulation of these compression 
screws due to the bone loss and fracture comminution.  We did elect in this case
to lock the compression screw into the proximal femur by tightening the set 
screw from above to create a fixed angle cephalo-medullary nail that would not 
allow any further compression after the set screw was locked.  Once we had 
achieved the fracture reduction and hardware placement within the proximal femur
as best as we felt that we could achieve we turned our attention towards locking
the distal end of the nail into the distal femur to prevent rotation through the
subtrochanteric fracture site.  This was performed using freehand technique with
fluoroscopic control using a radiolucent drill system.  We ultimately placed 5 
mm diameter distal locking bolts 2 to lock the distal end of the nail into the 
distal end of the femur.  We next took final AP and lateral fluoroscopic images 
of the entire femur from the hip proximally to the knee distally confirming 
fracture reduction and alignment and length as well as hardware position to all 
be satisfactory as we could achieve given what we will working with.  That being
said I did believe that the fracture reduction and alignment and length and 
hardware position and fixation were reasonably good.
 
At this point our attention was turned to soft tissue closure.  By this point we
had transfused the patient multiple units of packed red blood cells due to the 
extensive blood loss.  By the end of the case this was a transfusion of 3 units 
of packed red blood cells.  We had also redosed the patient with additional IV 
antibiotics given the duration of the case with the wounds being open widely.  
The bone and implant and muscle and soft tissues in general were copiously 
irrigated with 6 L of saline with bacitracin using the pulsatile lavage system 
to clean the soft tissues out as best as possible.  Once this was accomplished 
the soft tissue repair consisted of repair of the fascial layer over the vastus 
lateralis using #1 Vicryl suture material placed in interrupted buried fashion. 
The subcutaneous tissues were closed in multiple layers to cut down on dead 
space.  The deeper subcutaneous cutaneous tissue layers were closed with 0 
Vicryl suture material also placed in interrupted buried fashion in multiple 
layers.  The most superficial subcutaneous cutaneous tissue layer was 
approximated using 2-0 Vicryl placed in interrupted buried fashion.  The skin 
margins were then approximated using a skin stapler.  The 2 small stab wounds 
for placement of the distal locking bolts 2 were irrigated and the soft tissue 
repair consisted only of direct approximation of the superficial subcutaneous 
tissues using 2-0 Vicryl placed in interrupted buried fashion.  The skin margins
here were also approximated using a skin stapler.  All wounds were copiously 
irrigated and then dried.  Adaptic dressing was placed over the staple lines 
followed by sterile gauze dressings and abdominal pads which were held in place 
with paper tape.  The radiolucent extension to the table was returned into its 
position allowing us to take down the well leg from the well leg cantrell and 
takedown the affected leg from the traction boot with both lower extremities 
fully extended on the table.  The patient was then awakened from general 
anesthesia and extubated.  She was then transferred to a hospital bed and then 
brought to the recovery room in stable condition having tolerated the procedure 
well.
Findings:
Marked comminution of the entire trochanteric and subtrochanteric region of the 
proximal femur with multiple large loose bone fragments without soft tissue 
attachments.
 
1) Overall acceptable fracture reduction and alignment achieved.
2) Overall acceptable hardware position and fixation achieved.
 
fluoroscopic control using a radiolucent drill system.  We ultimately placed 5 
mm diameter distal locking bolts 2 to lock the distal end of the nail into the 
distal end of the femur.  We next took final AP and lateral fluoroscopic images 
of the entire femur from the hip proximally to the knee distally confirming 
fracture reduction and alignment and length as well as hardware position to all 
be satisfactory as we could achieve given what we will working with.  That being
said I did believe that the fracture reduction and alignment and length and 
hardware position and fixation were reasonably good.
 
At this point our attention was turned to soft tissue closure.  By this point we
had transfused the patient multiple units of packed red blood cells due to the 
extensive blood loss.  By the end of the case this was a transfusion of 3 units 
of packed red blood cells.  We had also redosed the patient with additional IV 
antibiotics given the duration of the case with the wounds being open widely.  
The bone and implant and muscle and soft tissues in general were copiously 
irrigated with 6 L of saline with bacitracin using the pulsatile lavage system 
to clean the soft tissues out as best as possible.  Once this was accomplished 
the soft tissue repair consisted of repair of the fascial layer over the vastus 
lateralis using #1 Vicryl suture material placed in interrupted buried fashion. 
The subcutaneous tissues were closed in multiple layers to cut down on dead 
space.  The deeper subcutaneous cutaneous tissue layers were closed with 0 
Vicryl suture material also placed in interrupted buried fashion in multiple 
layers.  The most superficial subcutaneous cutaneous tissue layer was 
approximated using 2-0 Vicryl placed in interrupted buried fashion.  The skin 
margins were then approximated using a skin stapler.  The 2 small stab wounds 
for placement of the distal locking bolts 2 were irrigated and the soft tissue 
repair consisted only of direct approximation of the superficial subcutaneous 
tissues using 2-0 Vicryl placed in interrupted buried fashion.  The skin margins
here were also approximated using a skin stapler.  All wounds were copiously 
irrigated and then dried.  Adaptic dressing was placed over the staple lines 
followed by sterile gauze dressings and abdominal pads which were held in place 
with paper tape.  The radiolucent extension to the table was returned into its 
position allowing us to take down the well leg from the well leg cantrell and 
takedown the affected leg from the traction boot with both lower extremities 
fully extended on the table.  The patient was then awakened from general 
anesthesia and extubated.  She was then transferred to a hospital bed and then 
brought to the recovery room in stable condition having tolerated the procedure 
well.
Findings:
Marked comminution of the entire trochanteric and subtrochanteric region of the 
proximal femur with multiple large loose bone fragments without soft tissue 
attachments.
 
1) Overall acceptable fracture reduction and alignment achieved.
2) Overall acceptable hardware position and fixation achieved.

## 2017-05-19 NOTE — PATIENT DISCHARGE INSTRUCTIONS
Discharge Instructions
 
General Discharge Information
You were seen/treated for:
Hip fracture 
Special Instructions:
- Please follow up with PCP in 1 week
- Monitor for bleeeding around the surgical site. Check CBC as needed.
- Surgical clips should be removed on postoperative day #15  (June 5th, 2017) 
and the incision being reinforced with Steri-Strips
- The patient should follow-up in the orthopedist' office in approximately 3 
weeks
- Out of bed to chair with physical therapy patient is toe-touch weightbearing 
only
- Warm compresses to right upper extremity
- Incentive spirometry
- Daily dry dressing change to surgical site
 
Diet
Continue normal diet: Yes
Recommended Diet: Heart Healthy
 
Acute Coronary Syndrome
 
Inclusion Criteria
At DC or during hospital stay patient has or had the following:
ACS DIAGNOSIS No
 
Discharge Core Measures
Meds if any: Prescribed or Continued at Discharge
Meds if any: NOT Prescribed or Continued at Discharge
 
Congestive Heart Failure
 
Inclusion Criteria
At DC or during hospital stay patient has or had the following:
CHF DIAGNOSIS No
 
Discharge Core Measures
Meds if any: Prescribed or Continued at Discharge
Meds if any: NOT Prescribed or Continued at Discharge
 
Cerebrovascular accident
 
Inclusion Criteria
At DC or during hospital stay patient has or had the following:
CVA/TIA Diagnosis No
 
Discharge Core Measures
Meds if any: Prescribed or Continued at Discharge
Meds if any: NOT Prescribed or Continued at Discharge
 
Venous thromboembolism
 
Inclusion Criteria
VTE Diagnosis No
VTE Type NONE
VTE Confirmed by (Test) NONE
 
Discharge Core Measures
- Per Current guidelines, there needs to be overlap
- treatment for the first 5 days of Warfarin therapy.
- If discharged on Warfarin prior to 5 days of
- overlap therapy, the patient will need to be
- assessed for post discharge needs including
- *Post discharge parental anticoagulation
- *Warfarin and/or parental anticoagulation education
- *Follow up date to check INR post discharge
At least 5 days overlap therapy as Inpatient No
Meds if any: Prescribed or Continued at Discharge
Note: Overlap Therapy is Warfarin and Anticoagulant
Meds if any: NOT Prescribed or Continued at Discharge

## 2017-05-20 VITALS — DIASTOLIC BLOOD PRESSURE: 58 MMHG | SYSTOLIC BLOOD PRESSURE: 108 MMHG

## 2017-05-20 VITALS — SYSTOLIC BLOOD PRESSURE: 104 MMHG | DIASTOLIC BLOOD PRESSURE: 64 MMHG

## 2017-05-20 VITALS — DIASTOLIC BLOOD PRESSURE: 62 MMHG | SYSTOLIC BLOOD PRESSURE: 92 MMHG

## 2017-05-20 VITALS — DIASTOLIC BLOOD PRESSURE: 60 MMHG | SYSTOLIC BLOOD PRESSURE: 110 MMHG

## 2017-05-20 VITALS — DIASTOLIC BLOOD PRESSURE: 50 MMHG | SYSTOLIC BLOOD PRESSURE: 100 MMHG

## 2017-05-20 VITALS — DIASTOLIC BLOOD PRESSURE: 60 MMHG | SYSTOLIC BLOOD PRESSURE: 160 MMHG

## 2017-05-20 VITALS — SYSTOLIC BLOOD PRESSURE: 80 MMHG | DIASTOLIC BLOOD PRESSURE: 40 MMHG

## 2017-05-20 LAB
ABSOLUTE GRANULOCYTE CT: 10 /CUMM (ref 1.4–6.5)
ABSOLUTE GRANULOCYTE CT: 14.3 /CUMM (ref 1.4–6.5)
BASOPHILS # BLD: 0 /CUMM (ref 0–0.2)
BASOPHILS # BLD: 0 /CUMM (ref 0–0.2)
BASOPHILS NFR BLD: 0 % (ref 0–2)
BASOPHILS NFR BLD: 0.1 % (ref 0–2)
EOSINOPHIL # BLD: 0 /CUMM (ref 0–0.7)
EOSINOPHIL # BLD: 0 /CUMM (ref 0–0.7)
EOSINOPHIL NFR BLD: 0 % (ref 0–5)
EOSINOPHIL NFR BLD: 0.1 % (ref 0–5)
ERYTHROCYTE [DISTWIDTH] IN BLOOD BY AUTOMATED COUNT: 15.6 % (ref 11.5–14.5)
ERYTHROCYTE [DISTWIDTH] IN BLOOD BY AUTOMATED COUNT: 16.1 % (ref 11.5–14.5)
GRANULOCYTES NFR BLD: 77 % (ref 42.2–75.2)
GRANULOCYTES NFR BLD: 85.8 % (ref 42.2–75.2)
HCT VFR BLD CALC: 28.8 % (ref 37–47)
HCT VFR BLD CALC: 35 % (ref 37–47)
LYMPHOCYTES # BLD: 1.5 /CUMM (ref 1.2–3.4)
LYMPHOCYTES # BLD: 1.8 /CUMM (ref 1.2–3.4)
MCH RBC QN AUTO: 32.6 PG (ref 27–31)
MCH RBC QN AUTO: 32.9 PG (ref 27–31)
MCHC RBC AUTO-ENTMCNC: 34 G/DL (ref 33–37)
MCHC RBC AUTO-ENTMCNC: 34 G/DL (ref 33–37)
MCV RBC AUTO: 96 FL (ref 81–99)
MCV RBC AUTO: 97 FL (ref 81–99)
MONOCYTES # BLD: 0.9 /CUMM (ref 0.1–0.6)
MONOCYTES # BLD: 1.2 /CUMM (ref 0.1–0.6)
PLATELET # BLD: 138 /CUMM (ref 130–400)
PLATELET # BLD: 165 /CUMM (ref 130–400)
PMV BLD AUTO: 8.9 FL (ref 7.4–10.4)
PMV BLD AUTO: 9.7 FL (ref 7.4–10.4)
RED BLOOD CELL CT: 3 /CUMM (ref 4.2–5.4)
RED BLOOD CELL CT: 3.61 /CUMM (ref 4.2–5.4)
WBC # BLD AUTO: 13 /CUMM (ref 4.8–10.8)
WBC # BLD AUTO: 16.7 /CUMM (ref 4.8–10.8)

## 2017-05-20 NOTE — PN- HOUSESTAFF
**See Addendum**
Subjective
Follow-up For:
Lt Hip Fx
Subjective:
Pt seen and examined. s/p IMHS. Received 3 U PRBC in the OR. Had episode of 
hypotension and received 250 CC IV bolus. Offers no specific complaints. Denies 
chest pain, shortness of breath, nausea, vomiting, dizziness, lightheadedness, 
abdominal pain, urinary symptoms.
 
VSS. /58
 
 
Review of Systems
Constitutional:
Reports: no symptoms. 
 
Objective
Last 24 Hrs of Vital Signs/I&O
 Vital Signs
 
 
Date Time Temp Pulse Resp B/P B/P Pulse O2 O2 Flow FiO2
 
     Mean Ox Delivery Rate 
 
05/20 0459    108/58     
 
05/20 0352  80  80/40     
 
05/20 0133    104/64     
 
05/20 0000  80  92/62     
 
05/20 0000      96 Nasal 3.0L 
 
       Cannula  
 
05/19 2330  82  86/42     
 
05/19 2329    86/49     
 
05/19 2328  70  82/40  97   
 
05/19 2300 97.7 84 18 74/40  95 Nasal 3.0L 
 
       Cannula  
 
05/19 1441 99.2 63 20 134/82  96   
 
05/19 1116       Nasal 2.0L 
 
       Cannula  
 
05/19 1034       Room Air  
 
05/19 1031       Room Air  
 
05/19 0924  70  110/70     
 
05/19 0924  70  110/70     
 
05/19 0626 98.2 62 20 108/60  94 Room Air  
 
 
 Intake & Output
 
 
 05/20 0800 05/20 0000 05/19 1600
 
Intake Total  600 450
 
Output Total   325
 
Balance  600 125
 
    
 
Intake, IV  600 450
 
Intake, Oral  0 
 
Output, Urine   325
 
 
 
 
Physical Exam
General Appearance: Alert
Skin: No Significant Lesion
HEENT: Atraumatic, PERRLA, EOMI, Mucous Membr. moist/pink
Neck: Supple
Cardiovascular: Regular Rate, Normal S1, Normal S2, No Murmurs
Lungs: Clear to Auscultation, Normal Air Movement
Abdomen: Normal Bowel Sounds, Soft, No Tenderness, No Hepatospenomegaly
Extremities: No Clubbing, No Cyanosis, No Edema
Vascular: Normal Pulses, Pulses Symmetrical
Current Medications:
 Current Medications
 
 
  Sig/Shauna Start time  Last
 
Medication Dose Route Stop Time Status Admin
 
Acetaminophen 1,000 MG .STK-MED ONE 05/19 1447 DC 
 
  IV 05/19 1448  
 
Acetaminophen 650 MG Q6P PRN 05/17 1300 DC 05/18
 
  PO   0945
 
Cefazolin Sodium 3,000 MG Q8H 05/20 0300 AC 05/20
 
Sodium Chloride 100 ML IV 05/20 1159  0339
 
Dabigatran 150 MG BID 05/20 1000 CAN 
 
  PO   
 
Dabigatran 150 MG BID 05/20 1000 AC 
 
  PO   
 
Dexamethasone 4 MG .STK-MED ONE 05/19 1447 DC 
 
  IM 05/19 1448  
 
Dextrose/Sodium  1,000 ML Q10H 05/19 2245 AC 05/20
 
Chloride  IV   0000
 
Docusate Sodium 100 MG BID 05/20 1000 AC 
 
  PO   
 
Docusate Sodium 100 MG BID 05/18 1024 DC 05/19
 
  PO   0921
 
Fentanyl Citrate 250 MCG .STK-MED ONE 05/19 1446 DC 
 
  IM 05/19 1447  
 
Fluticasone  2 SPRAY DAILY 05/20 1000 AC 
 
Propionate  LEÓN   
 
Fluticasone  2 SPRAY DAILY 05/18 1530 DC 05/19
 
Propionate  LEÓN   0924
 
Furosemide 40 MG DAILY 05/20 1000 AC 
 
  PO   
 
Furosemide 40 MG DAILY 05/18 1000 DC 05/19
 
  PO   0921
 
Hydromorphone HCl 2 MG .STK-MED ONE 05/19 1446 DC 
 
  IM 05/19 1447  
 
Ketorolac  15 MG Q6P PRN 05/17 1300 AC 05/19
 
Tromethamine  IV 05/22 1259  1235
 
Levothyroxine Sodium 0.175 MG DAILY AC 05/20 0700 AC 
 
  PO   
 
Levothyroxine Sodium 0.175 MG DAILY AC 05/18 0700 DC 05/19
 
  PO   0921
 
Losartan Potassium 25 MG DAILY 05/20 1000 AC 
 
  PO   
 
Losartan Potassium 25 MG DAILY 05/18 1000 DC 05/19
 
  PO   0924
 
Metoprolol Succinate 50 MG DAILY 05/20 1000 AC 
 
  PO   
 
Metoprolol Succinate 50 MG DAILY 05/18 1000 DC 05/19
 
  PO   0924
 
Midazolam HCl 2 MG .STK-MED ONE 05/19 1446 DC 
 
  IM 05/19 1447  
 
Morphine Sulfate 2 MG Q4P PRN 05/19 2300 AC 
 
  IV   
 
Morphine Sulfate 4 MG Q4P PRN 05/19 2300 AC 
 
  IV   
 
Morphine Sulfate 2 MG Q6P PRN 05/17 1415 DC 05/18
 
  IV   2122
 
Ondansetron HCl 4 MG Q6P PRN 05/19 2300 AC 
 
  IV   
 
Ondansetron HCl 8 MG .STK-MED ONE 05/19 1447 DC 
 
  IM 05/19 1448  
 
Paroxetine HCl 40 MG DAILY 05/20 1000 AC 
 
  PO   
 
Paroxetine HCl 40 MG DAILY 05/18 1000 DC 05/19
 
  PO   0921
 
Polyethylene Glycol 17 GM DAILY 05/20 1000 AC 
 
  PO   
 
Polyethylene Glycol 17 GM DAILY 05/18 1024 DC 05/18
 
  PO   1234
 
Pravastatin Sodium 10 MG 1700 05/20 1700 AC 
 
  PO   
 
Pravastatin Sodium 10 MG 1700 05/18 1700 DC 05/18
 
  PO   1743
 
Senna 187 MG AT BEDTIME 05/20 2200 AC 
 
  PO   
 
Senna 187 MG AT BEDTIME 05/18 2200 DC 05/18
 
  PO   2122
 
Sodium Chloride 500 ML BOLUS ONE 05/20 0400 DC 05/20
 
  IV 05/20 0459  0402
 
Sodium Chloride 500 ML BOLUS ONE 05/19 2330 DC 05/19
 
  IV 05/20 0029  2330
 
Sodium Chloride 1,000 ML Q13H 05/19 0815 DC 05/19
 
  IV 05/19 2114  0920
 
Warfarin Sodium 7.5 MG COUMADIN 1700 ONE 05/20 1700 CAN 
 
  PO 05/20 1701  
 
 
 
 
Last 24 Hrs of Lab/Kar Results
Last 24 Hrs of Labs/Mics:
 Laboratory Tests
 
05/19/17 2130:
Anion Gap 13, Estimated GFR 53  L, BUN/Creatinine Ratio 15.0, CBC w Diff NO MAN 
DIFF REQ, RBC 4.38, MCV 95.9, MCH 32.4  H, RDW 15.6  H, MPV 8.9, Gran % 87.3  H,
Lymphocytes % 7.8  L, Monocytes % 4.6, Eosinophils % 0.1, Basophils % 0.2, 
Absolute Granulocytes 13.4  H, Absolute Lymphocytes 1.2, Absolute Monocytes 0.7 
H, Absolute Eosinophils 0, Absolute Basophils 0, PUBS MCHC 33.8
 
05/19/17 0610:
Anion Gap 7, Estimated GFR > 60, BUN/Creatinine Ratio 20.0, CBC w Diff NO MAN 
DIFF REQ, RBC 3.46  L, MCV 98.5, MCH 33.1  H, RDW 13.8, MPV 9.0, Gran % 58.8, 
Lymphocytes % 26.8, Monocytes % 10.5  H, Eosinophils % 3.4, Basophils % 0.5, 
Absolute Granulocytes 4.7, Absolute Lymphocytes 2.1, Absolute Monocytes 0.8  H, 
Absolute Eosinophils 0.3, Absolute Basophils 0, PUBS MCHC 33.6
 
 
Assessment/Plan
Assessment:
81-year-old with PMH of atrial fibrillation  on pradaxa, sick sinus s/p 
pacemaker, hypothyroidism, NONoxygen dependent COPD, pulmonary hypertension. 
 
# Left intertrochanteric fracture
- Received 3 U prbc in the OR: Monitor H&H closely. 
-last pradaxa taken prior to admision 5/17 am
- pradaxa was held for 24 hrs prior to surgery, no heparin bridging as per 
cardio
 
* bowel regimen miralax senna colace
* c/w current pain MX regimen
* Resume pradaxa after surgery based on surgery and cardio recc
* appreciate cardio (Dr Sami Rivero) and ortho input 
* continue underwood 
* PT after surgery 
* incentive spirometry and trc
 
# Atrial fibrillation  on pradaxa, sick sinus s/p pacemaker, hypothyroidism, NON
oxygen dependent COPD
* continue home meds pravachol, paxil, metoprolol, losartan, lasix, synthroid 
 
Diet: heart healthy
DVT prophylaxis on pradaxa
FC
 
Problem List:
 1. Intertrochanteric fracture of left hip
 
 2. Atrial fibrillation
 
Pain Rating: 3
Pain Location:
Lt hip
Pain Goal: Pain 4 or less
Pain Plan:
As per order
Tomorrow's Labs & Rationales:
CBC to monitor H&H

## 2017-05-20 NOTE — PN- ORTHOPEDIC
**See Addendum**
Subjective
Subjective:
Pt seen and examined by Dr Louis - see addendum to Ortho consult earlier today
 
 
Objective
Vital Signs and I&Os
Vital Signs
 
 
Date Time Temp Pulse Resp B/P B/P Pulse O2 O2 Flow FiO2
 
     Mean Ox Delivery Rate 
 
05/20 0706 98.0 70 16 100/50  94   
 
05/20 0459    108/58     
 
05/20 0352  80  80/40     
 
05/20 0133    104/64     
 
05/20 0000  80  92/62     
 
05/20 0000      96 Nasal 3.0L 
 
       Cannula  
 
05/19 2330  82  86/42     
 
05/19 2329    86/49     
 
05/19 2328  70  82/40  97   
 
05/19 2300 97.7 84 18 74/40  95 Nasal 3.0L 
 
       Cannula  
 
05/19 1441 99.2 63 20 134/82  96   
 
05/19 1116       Nasal 2.0L 
 
       Cannula  
 
05/19 1034       Room Air  
 
05/19 1031       Room Air  
 
05/19 0924  70  110/70     
 
05/19 0924  70  110/70     
 
 
 Intake & Output
 
 
 05/20 1600 05/20 0800 05/20 0000 05/19 1600 05/19 0800 05/19 0000
 
Intake Total  1700 600 450 20 260
 
Output Total  150  
 
Balance  1550 600 125 -230 -890
 
       
 
Intake, IV  1500 600 450 20 20
 
Intake, Oral  200 0   240
 
Output, Urine  150  
 
 
 
 
Assessment/Plan
Assessment/Plan
82 yo female pod 1 s/p difficulty IMHS with significant blood loss intraop, 
Acute post op blood loss anemia requiring 3 units of prbc intraop
 
Per Dr Louis, no anticoagulation to be given today due to bleeding risk
Plan to restart anticoagulation in the morning 5/21
Recommend coumadin but will defer to medical team
 
Weight bear status is strict toe touch weight bear - bed to chair only - no 
further ambulation at this time

## 2017-05-20 NOTE — PN- ORTHOPEDIC
**See Addendum**
Subjective
Subjective:
POD #0 s/p IMHS L hip fracture. 
 
Pt was found to have an extensive fracture intraoperatively. She received 3u of 
prbc's for expected anemia due to intraop blood loss. Postop h/h was 14/42. At 
this time, pt has returned to [facility] floor. She is still fairly tired, but has 
no major complaints. She denies pain. Nothing po as of yet due to sedation. She 
had an episode of hypotension to the 70d-80s systolic and is currently receiving
a gentle bolus of 250 NS. 
 
Objective
Vital Signs and I&Os
Vital Signs
 
 
Date Time Temp Pulse Resp B/P B/P Pulse O2 O2 Flow FiO2
 
     Mean Ox Delivery Rate 
 
05/19 2329    86/49     
 
05/19 2328  70  82/40  97   
 
05/19 1441 99.2 63 20 134/82  96   
 
05/19 1116       Nasal 2.0L 
 
       Cannula  
 
05/19 1034       Room Air  
 
05/19 1031       Room Air  
 
05/19 0924  70  110/70     
 
05/19 0924  70  110/70     
 
05/19 0626 98.2 62 20 108/60  94 Room Air  
 
 
 Intake & Output
 
 
 05/20 0800 05/20 0000 05/19 1600 05/19 0800 05/19 0000 05/18 1600
 
Intake Total   450 20 260 730
 
Output Total    700
 
Balance   125 -230 -890 30
 
       
 
Intake, IV   450 20 20 
 
Intake, Oral     240 730
 
Output, Urine    700
 
 
 
Physical Exam:
General: pt is resting but easily arousable. 
Ext: L thigh dressing is c/d/i. L foot is cool-warm, hyperpigmented (appears 
chronic). she is able to move her toes. sensation is intact. 
 
Results
Last 48 Hours of Labs:
 Laboratory Tests
 
 
 05/19 05/19
 
 2130 0610
 
Chemistry  
 
  Sodium (137 - 145 mmol/L) 133  L 135  L
 
  Potassium (3.5 - 5.1 mmol/L) 4.6 4.4
 
  Chloride (98 - 107 mmol/L) 96  L 97  L
 
  Carbon Dioxide (22 - 30 mmol/L) 24 31  H
 
  Anion Gap (5 - 16) 13 7
 
  BUN (7 - 17 mg/dL) 15 14
 
  Creatinine (0.5 - 1.0 mg/dL) 1.0 0.7
 
  Estimated GFR (>60 ml/min) 53  L > 60
 
  BUN/Creatinine Ratio (7 - 25 %) 15.0 20.0
 
Hematology  
 
  CBC w Diff NO MAN DIFF REQ NO MAN DIFF REQ
 
  WBC (4.8 - 10.8 /CUMM) 15.3  H 8.0
 
  RBC (4.20 - 5.40 /CUMM) 4.38 3.46  L
 
  Hgb (12.0 - 16.0 G/DL) 14.2 11.4  L
 
  Hct (37 - 47 %) 41.9 34.1  L
 
  MCV (81.0 - 99.0 FL) 95.9 98.5
 
  MCH (27.0 - 31.0 PG) 32.4  H 33.1  H
 
  RDW (11.5 - 14.5 %) 15.6  H 13.8
 
  Plt Count (130 - 400 /CUMM) 145 155
 
  MPV (7.4 - 10.4 FL) 8.9 9.0
 
  Gran % (42.2 - 75.2 %) 87.3  H 58.8
 
  Lymphocytes % (20.5 - 51.1 %) 7.8  L 26.8
 
  Monocytes % (1.7 - 9.3 %) 4.6 10.5  H
 
  Eosinophils % (0 - 5 %) 0.1 3.4
 
  Basophils % (0.0 - 2.0 %) 0.2 0.5
 
  Absolute Granulocytes (1.4 - 6.5 /CUMM) 13.4  H 4.7
 
  Absolute Lymphocytes (1.2 - 3.4 /CUMM) 1.2 2.1
 
  Absolute Monocytes (0.10 - 0.60 /CUMM) 0.7  H 0.8  H
 
  Absolute Eosinophils (0.0 - 0.7 /CUMM) 0 0.3
 
  Absolute Basophils (0.0 - 0.2 /CUMM) 0 0
 
  PUBS MCHC (33.0 - 37.0 G/DL) 33.8 33.6
 
 
 
 
 05/18
 
 0620
 
Chemistry 
 
  Sodium (137 - 145 mmol/L) 133  L
 
  Potassium (3.5 - 5.1 mmol/L) 4.0
 
  Chloride (98 - 107 mmol/L) 94  L
 
  Carbon Dioxide (22 - 30 mmol/L) 30
 
  Anion Gap (5 - 16) 9
 
  BUN (7 - 17 mg/dL) 14
 
  Creatinine (0.5 - 1.0 mg/dL) 0.7
 
  Estimated GFR (>60 ml/min) > 60
 
  BUN/Creatinine Ratio (7 - 25 %) 20.0
 
Hematology 
 
  CBC w Diff NO MAN DIFF REQ
 
  WBC (4.8 - 10.8 /CUMM) 6.7
 
  RBC (4.20 - 5.40 /CUMM) 3.88  L
 
  Hgb (12.0 - 16.0 G/DL) 12.9
 
  Hct (37 - 47 %) 38.2
 
  MCV (81.0 - 99.0 FL) 98.6
 
  MCH (27.0 - 31.0 PG) 33.2  H
 
  RDW (11.5 - 14.5 %) 13.9
 
  Plt Count (130 - 400 /CUMM) 177
 
  MPV (7.4 - 10.4 FL) 9.3
 
  Gran % (42.2 - 75.2 %) 56.7
 
  Lymphocytes % (20.5 - 51.1 %) 29.3
 
  Monocytes % (1.7 - 9.3 %) 10.6  H
 
  Eosinophils % (0 - 5 %) 2.8
 
  Basophils % (0.0 - 2.0 %) 0.6
 
  Absolute Granulocytes (1.4 - 6.5 /CUMM) 3.8
 
  Absolute Lymphocytes (1.2 - 3.4 /CUMM) 2.0
 
  Absolute Monocytes (0.10 - 0.60 /CUMM) 0.7  H
 
  Absolute Eosinophils (0.0 - 0.7 /CUMM) 0.2
 
  Absolute Basophils (0.0 - 0.2 /CUMM) 0
 
  PUBS MCHC (33.0 - 37.0 G/DL) 33.7
 
 
 
 
Assessment/Plan
Assessment/Plan
Pt is an 82 yo F with a hx of afib (on pradaxa), PPM, COPD (on home O2), and htn
who is now POD #0 s/p L hip IMHS. 
 
Plan: 
-Pt may mobilize OOB to chair at most with PT. Toe touch weightbearing is her 
max allowed. She should not ambulate. 
-Pain control with morphine if needed and if BP is stable. Otherwise, consider 
IV APAP. Would hold NSAIDs for now. 
-DVT ppx with pradaxa. Dr. Louis asked for Coumadin, but the medical team 
confirmed that pradaxa would provide adequate coverage for DVT ppx, so this will
be started as long as there are no major signs of bleeding. 
-Repeat labs in AM. 
-Dry dressing change on POD #2. 
-Bowel regimen with senna, colace, miralax. 
-Medical management per primary team.

## 2017-05-20 NOTE — RADIOLOGY REPORT
EXAMINATION:
XR HIP, LEFT
 
CLINICAL INFORMATION:
Intraoperative fluoroscopy is submitted during left hip ORIF.
 
COMPARISON:
Left hip radiographs dated 05/17/2017 showing a left proximal femur
intertrochanteric fracture.
 
TECHNIQUE:
Two views of the left hip.
 
PATIENT DOSE:
3.0 minutes.
 
NUMBER OF IMAGES: 56.
 
FINDINGS:
Intraoperative fluoroscopic guidance is submitted during left femoral
intramedullary estela placement and application of left femoral neck compression
screw. At the conclusion of the procedure, there is restoration of a bony
alignment.
 
IMPRESSION:
Intraoperative fluoroscopic guidance is provided during ORIF of a comminuted
proximal left femur intertrochanteric fracture. Please see Operative Report
for full procedural details.

## 2017-05-21 VITALS — SYSTOLIC BLOOD PRESSURE: 142 MMHG | DIASTOLIC BLOOD PRESSURE: 54 MMHG

## 2017-05-21 VITALS — DIASTOLIC BLOOD PRESSURE: 64 MMHG | SYSTOLIC BLOOD PRESSURE: 120 MMHG

## 2017-05-21 VITALS — DIASTOLIC BLOOD PRESSURE: 60 MMHG | SYSTOLIC BLOOD PRESSURE: 120 MMHG

## 2017-05-21 LAB
ABSOLUTE GRANULOCYTE CT: 8.6 /CUMM (ref 1.4–6.5)
ABSOLUTE GRANULOCYTE CT: 9.9 /CUMM (ref 1.4–6.5)
BASOPHILS # BLD: 0 /CUMM (ref 0–0.2)
BASOPHILS # BLD: 0 /CUMM (ref 0–0.2)
BASOPHILS NFR BLD: 0.2 % (ref 0–2)
BASOPHILS NFR BLD: 0.2 % (ref 0–2)
EOSINOPHIL # BLD: 0.1 /CUMM (ref 0–0.7)
EOSINOPHIL # BLD: 0.2 /CUMM (ref 0–0.7)
EOSINOPHIL NFR BLD: 1 % (ref 0–5)
EOSINOPHIL NFR BLD: 1.2 % (ref 0–5)
ERYTHROCYTE [DISTWIDTH] IN BLOOD BY AUTOMATED COUNT: 15.6 % (ref 11.5–14.5)
ERYTHROCYTE [DISTWIDTH] IN BLOOD BY AUTOMATED COUNT: 15.7 % (ref 11.5–14.5)
GRANULOCYTES NFR BLD: 73.1 % (ref 42.2–75.2)
GRANULOCYTES NFR BLD: 77.7 % (ref 42.2–75.2)
HCT VFR BLD CALC: 26 % (ref 37–47)
HCT VFR BLD CALC: 27.1 % (ref 37–47)
LYMPHOCYTES # BLD: 1.6 /CUMM (ref 1.2–3.4)
LYMPHOCYTES # BLD: 1.8 /CUMM (ref 1.2–3.4)
MCH RBC QN AUTO: 32.8 PG (ref 27–31)
MCH RBC QN AUTO: 32.9 PG (ref 27–31)
MCHC RBC AUTO-ENTMCNC: 33.6 G/DL (ref 33–37)
MCHC RBC AUTO-ENTMCNC: 33.9 G/DL (ref 33–37)
MCV RBC AUTO: 97.1 FL (ref 81–99)
MCV RBC AUTO: 97.6 FL (ref 81–99)
MONOCYTES # BLD: 1.1 /CUMM (ref 0.1–0.6)
MONOCYTES # BLD: 1.2 /CUMM (ref 0.1–0.6)
PLATELET # BLD: 161 /CUMM (ref 130–400)
PLATELET # BLD: 161 /CUMM (ref 130–400)
PMV BLD AUTO: 8.5 FL (ref 7.4–10.4)
PMV BLD AUTO: 9 FL (ref 7.4–10.4)
RED BLOOD CELL CT: 2.67 /CUMM (ref 4.2–5.4)
RED BLOOD CELL CT: 2.78 /CUMM (ref 4.2–5.4)
WBC # BLD AUTO: 11.8 /CUMM (ref 4.8–10.8)
WBC # BLD AUTO: 12.7 /CUMM (ref 4.8–10.8)

## 2017-05-21 NOTE — PN- HOUSESTAFF
IGOR WHITT,WILBER 17 0818:
Subjective
Follow-up For:
hip fracture 
Subjective:
pain level 0-4 
 
wbc 16.7 to 11.8, no bands or seg neutrophils
 
hb 11.9 to 9.1 , discussed with Dr. Mckinley, cardiology on call, who recommends 
asking surgery regarding anticoagulation choice. surgery ok with resuming  
pradaxa. 
 
cr 1.1 to 0.8, will resume losartan and lasix. 
 
pt currently has no iv access. so can only get po meds. 
 
Review of Systems
Constitutional:
Reports: see HPI. 
 
Objective
Last 24 Hrs of Vital Signs/I&O
 Vital Signs
 
 
Date Time Temp Pulse Resp B/P B/P Pulse O2 O2 Flow FiO2
 
     Mean Ox Delivery Rate 
 
 1122    140/60     
 
 0800      95 Nasal 3.0L 
 
       Cannula  
 
 0647 97.8 68 18 142/54  96 Nasal 3.0L 
 
       Cannula  
 
 0000       Nasal 3.0L 
 
       Cannula  
 
 2253 98.7 63 20 160/60  100 Nasal 3.0L 
 
       Cannula  
 
 1602 97.7 86 20 110/60  98   
 
 1600       Nasal 3.0L 
 
       Cannula  
 
 
 Intake & Output
 
 
  1600  0800  0000
 
Intake Total   
 
Output Total  400 600
 
Balance  -400 -600
 
    
 
Output, Urine  400 600
 
 
 
 
Physical Exam
General Appearance: Alert, Oriented X3, Cooperative, No Acute Distress
HEENT: Atraumatic
Cardiovascular: irregularly irregular rate 
Lungs: Clear to Auscultation, Normal Air Movement
Abdomen: Normal Bowel Sounds, Soft, No Tenderness
Current Medications:
 Current Medications
 
 
  Sig/Shauna Start time  Last
 
Medication Dose Route Stop Time Status Admin
 
Acetaminophen 650 MG Q4P PRN  0945 AC 
 
  PO   
 
Acetaminophen 1,000 MG Q6 PRN  1015 DC 
 
  IV   1748
 
Bisacodyl 10 MG ONCE ONE  1245 UNVr 
 
  MD  1246  
 
Dabigatran 150 MG BID  1230 DC 
 
  PO   
 
Dextrose/Sodium  1,000 ML Q10H  2245 DC 
 
Chloride  IV   0339
 
Docusate Sodium 100 MG BID  1000 AC 
 
  PO   1123
 
Fluticasone  2 SPRAY DAILY  1000 AC 
 
Propionate  LEÓN   1122
 
Furosemide 40 MG DAILY  1230 AC 
 
  PO   
 
Hydromorphone HCl 1 MG Q6P PRN  1245 UNVr 
 
  PO   
 
Levothyroxine Sodium 0.175 MG DAILY AC  0700 AC 
 
  PO   0649
 
Losartan Potassium 25 MG DAILY  1230 AC 
 
  PO   
 
Metoprolol Succinate 50 MG DAILY  1000 AC 
 
  PO   1122
 
Morphine Sulfate 2 MG Q4P PRN  1230 DC 
 
  IV   
 
Morphine Sulfate 2 MG Q4P PRN  2300 DC 
 
  IV   
 
Morphine Sulfate 4 MG Q4P PRN  2300 DC 
 
  IV   
 
Ondansetron HCl 4 MG Q6P PRN  2300 AC 
 
  IV   1836
 
Paroxetine HCl 40 MG DAILY  1000 AC 
 
  PO   1123
 
Polyethylene Glycol 17 GM DAILY  1000 AC 
 
  PO   1123
 
Pravastatin Sodium 10 MG 1700  1700 AC 
 
  PO   
 
Senna 187 MG AT BEDTIME  2200 AC 
 
  PO   
 
 
 
 
Last 24 Hrs of Lab/Kar Results
Last 24 Hrs of Labs/Mics:
 Laboratory Tests
 
17 0750:
Anion Gap 4  L, Estimated GFR > 60, BUN/Creatinine Ratio 22.5, CBC w Diff NO MAN
DIFF REQ, RBC 2.78  L, MCV 97.6, MCH 32.8  H, RDW 15.7  H, MPV 9.0, Gran % 73.1,
Lymphocytes % 15.1  L, Monocytes % 10.6  H, Eosinophils % 1.0, Basophils % 0.2, 
Absolute Granulocytes 8.6  H, Absolute Lymphocytes 1.8, Absolute Monocytes 1.2  
H, Absolute Eosinophils 0.1, Absolute Basophils 0, PUBS MCHC 33.6
 
17:
CBC w Diff NO MAN DIFF REQ, RBC 3.00  L, MCV 96.0, MCH 32.6  H, RDW 15.6  H, MPV
8.9, Gran % 77.0  H, Lymphocytes % 13.6  L, Monocytes % 9.2, Eosinophils % 0.1, 
Basophils % 0.1, Absolute Granulocytes 10.0  H, Absolute Lymphocytes 1.8, 
Absolute Monocytes 1.2  H, Absolute Eosinophils 0, Absolute Basophils 0, PUBS 
MCHC 34.0
 
 
Assessment/Plan
Assessment:
81-year-old with PMH of atrial fibrillation  on pradaxa, sick sinus s/p 
pacemaker, hypothyroidism, NONoxygen dependent COPD, pulmonary hypertension. 
 
# Left intertrochanteric fracture, s/p repair left comminuted intertrochanteric-
subtrochanteric proximal femur fracture with long cephalo-medullary nail.
# Constipation 
- last pradaxa taken prior to admision  am
- pradaxa was held for 48 hrs prior to surgery, no heparin bridging as per 
cardio
- Received 3 U prbc in the OR: Monitor H&H closely
* bowel regimen miralax senna colace, suppository X 1 
* c/w current pain MX regimen
* Resume pradaxa 17 am , watch h/h closely 
* appreciate cardio (Dr Sami Rivero) and ortho input (Dr. Louis)
* continue underwood 
* PT after surgery - bed to chair only, strict toe touch 
* incentive spirometry and trc
 
# LARRY post op
- most likely prerenal due to perioperative blood loss
* Cr was up to 1.1, now back to 0.8 
* lasix, losartan was held, now restarted
 
# Atrial fibrillation  on pradaxa, sick sinus s/p pacemaker, hypothyroidism, NON
oxygen dependent COPD
* continue home meds pravachol, paxil, metoprolol, synthroid, losartan, lasix 
 
pp: tylenol mild, percocet moderate, dilaudid severe, all po 
Diet: heart healthy
DVT prophylaxis: alps , pradaxa 
FC
 
Problem List:
 1. Intertrochanteric fracture of left hip
 
Pain Ratin
Pain Location:
hip
Pain Goal: Pain 4 or less
Pain Plan:
tylenol
percocet
dilaudid 
Tomorrow's Labs & Rationales:
cbc for acute blood loss anemia
bep for larry 
DVT/Prophylaxis: mechanical, pharmacological
 
 
COSME WHITT,[facility] 17 1105:
Attending MD Review Statement
 
Attending Statement
Attending MD Statement: examined this patient, discuss w/resident/PA/NP, agreed 
w/resident/PA/NP, discussed with family, reviewed EMR data (avail), discussed 
with nursing, discussed with case mgmt, reviewed images, amended to note
Attending Assessment/Plan:
Patient sitting comfortably on the recliner.  Does not offer any complaints.  
Her H&H is stable.  Her kidney function is back to baseline.
 
Recommendations
1.  We can resume her blood pressure medications with holding parameters
2.  Restart her Pradaxa today, this was discussed with surgical PA.  Recheck her
CBC tomorrow morning to ensure there is no drop in hemoglobin and hematocrit.
3.  Can restart her Lasix and Cozaar.
4.  Morphine for pain, avoid sedation and constipation.

## 2017-05-21 NOTE — PN- ORTHOPEDIC
**See Addendum**
Subjective
Subjective:
Pt was mobilized to chair with max assist and required eder to return to bed. 
She admits to discomfort in the surgical area, but well controlled. Otherwise no
dizziness, CP, or SOB. 
 
Objective
Vital Signs and I&Os
Vital Signs
 
 
Date Time Temp Pulse Resp B/P B/P Pulse O2 O2 Flow FiO2
 
     Mean Ox Delivery Rate 
 
05/21 1122    140/60     
 
05/21 0800      95 Nasal 3.0L 
 
       Cannula  
 
05/21 0647 97.8 68 18 142/54  96 Nasal 3.0L 
 
       Cannula  
 
05/21 0000       Nasal 3.0L 
 
       Cannula  
 
05/20 2253 98.7 63 20 160/60  100 Nasal 3.0L 
 
       Cannula  
 
05/20 1602 97.7 86 20 110/60  98   
 
05/20 1600       Nasal 3.0L 
 
       Cannula  
 
 
 Intake & Output
 
 
 05/21 1600 05/21 0800 05/21 0000 05/20 1600 05/20 0800 05/20 0000
 
Intake Total    1100 1700 600
 
Output Total  400 600  150 
 
Balance  -400 -600 1100 1550 600
 
       
 
Intake, IV    800 1500 600
 
Intake, Oral    300 200 0
 
Output, Urine  400 600  150 
 
 
 
Physical Exam:
General: Pt is awake and alert. NAD. 
Ext: The surgical dressing was removed. Incision is clean, dry and intact with 
staples in place. There is mild ecchymosis around the incision, which appears to
be due to retraction and not hematoma. The thigh is softly swollen, within 
expected limits for this extensive surgery. LLE sensation is intact. Strenth of 
DF and PF are 4/5 on the left. (Of note, Pt is able to log roll independently 
for dressing change.) 
 
Results
Last 48 Hours of Labs:
 Laboratory Tests
 
 
 05/21 05/20 0750 2025
 
Chemistry  
 
  Sodium (137 - 145 mmol/L) 136  L 
 
  Potassium (3.5 - 5.1 mmol/L) 4.1 
 
  Chloride (98 - 107 mmol/L) 103 
 
  Carbon Dioxide (22 - 30 mmol/L) 28 
 
  Anion Gap (5 - 16) 4  L 
 
  BUN (7 - 17 mg/dL) 18  H 
 
  Creatinine (0.5 - 1.0 mg/dL) 0.8 
 
  Estimated GFR (>60 ml/min) > 60 
 
  BUN/Creatinine Ratio (7 - 25 %) 22.5 
 
Hematology  
 
  CBC w Diff NO MAN DIFF REQ NO MAN DIFF REQ
 
  WBC (4.8 - 10.8 /CUMM) 11.8  H 13.0  H
 
  RBC (4.20 - 5.40 /CUMM) 2.78  L 3.00  L
 
  Hgb (12.0 - 16.0 G/DL) 9.1  L 9.8  L
 
  Hct (37 - 47 %) 27.1  L 28.8  L
 
  MCV (81.0 - 99.0 FL) 97.6 96.0
 
  MCH (27.0 - 31.0 PG) 32.8  H 32.6  H
 
  RDW (11.5 - 14.5 %) 15.7  H 15.6  H
 
  Plt Count (130 - 400 /CUMM) 161 165
 
  MPV (7.4 - 10.4 FL) 9.0 8.9
 
  Gran % (42.2 - 75.2 %) 73.1 77.0  H
 
  Lymphocytes % (20.5 - 51.1 %) 15.1  L 13.6  L
 
  Monocytes % (1.7 - 9.3 %) 10.6  H 9.2
 
  Eosinophils % (0 - 5 %) 1.0 0.1
 
  Basophils % (0.0 - 2.0 %) 0.2 0.1
 
  Absolute Granulocytes (1.4 - 6.5 /CUMM) 8.6  H 10.0  H
 
  Absolute Lymphocytes (1.2 - 3.4 /CUMM) 1.8 1.8
 
  Absolute Monocytes (0.10 - 0.60 /CUMM) 1.2  H 1.2  H
 
  Absolute Eosinophils (0.0 - 0.7 /CUMM) 0.1 0
 
  Absolute Basophils (0.0 - 0.2 /CUMM) 0 0
 
  PUBS MCHC (33.0 - 37.0 G/DL) 33.6 34.0
 
 
 
 
 05/20 05/19
 
 0645 2130
 
Chemistry  
 
  Sodium (137 - 145 mmol/L) 137 133  L
 
  Potassium (3.5 - 5.1 mmol/L) 4.7 4.6
 
  Chloride (98 - 107 mmol/L) 100 96  L
 
  Carbon Dioxide (22 - 30 mmol/L) 27 24
 
  Anion Gap (5 - 16) 11 13
 
  BUN (7 - 17 mg/dL) 20  H 15
 
  Creatinine (0.5 - 1.0 mg/dL) 1.1  H 1.0
 
  Estimated GFR (>60 ml/min) 48  L 53  L
 
  BUN/Creatinine Ratio (7 - 25 %) 18.2 15.0
 
Hematology  
 
  CBC w Diff MAN DIFF ORDERED NO MAN DIFF REQ
 
  WBC (4.8 - 10.8 /CUMM) 16.7  H 15.3  H
 
  RBC (4.20 - 5.40 /CUMM) 3.61  L 4.38
 
  Hgb (12.0 - 16.0 G/DL) 11.9  L 14.2
 
  Hct (37 - 47 %) 35.0  L 41.9
 
  MCV (81.0 - 99.0 FL) 97.0 95.9
 
  MCH (27.0 - 31.0 PG) 32.9  H 32.4  H
 
  RDW (11.5 - 14.5 %) 16.1  H 15.6  H
 
  Plt Count (130 - 400 /CUMM) 138 145
 
  MPV (7.4 - 10.4 FL) 9.7 8.9
 
  Gran % (42.2 - 75.2 %) 85.8  H 87.3  H
 
  Lymphocytes % (20.5 - 51.1 %) 8.8  L 7.8  L
 
  Monocytes % (1.7 - 9.3 %) 5.4 4.6
 
  Eosinophils % (0 - 5 %) 0 0.1
 
  Basophils % (0.0 - 2.0 %) 0  L 0.2
 
  Absolute Granulocytes (1.4 - 6.5 /CUMM) 14.3  H 13.4  H
 
  Segmented Neutrophils (42.2 - 75.2 %) 76  H 
 
  Band Neutrophils (0.0 - 5.0 %) 10  H 
 
  Absolute Lymphocytes (1.2 - 3.4 /CUMM) 1.5 1.2
 
  Lymphocytes (20.5 - 51.1 %) 10  L 
 
  Monocytes (1.7 - 9.3 %) 4 
 
  Absolute Monocytes (0.10 - 0.60 /CUMM) 0.9  H 0.7  H
 
  Absolute Eosinophils (0.0 - 0.7 /CUMM) 0 0
 
  Absolute Basophils (0.0 - 0.2 /CUMM) 0 0
 
  Platelet Estimate (ADEQUATE) VERIFIED BY SMEAR 
 
  Polychromasia 1+ 
 
  Anisocytosis 1+ 
 
  PUBS MCHC (33.0 - 37.0 G/DL) 34.0 33.8
 
 
 
 
Assessment/Plan
Assessment/Plan
Pt is an 82 yo F with a hx of afib, ppm, O2 dependent copd, and htn, who is now 
POD #2 s/p IMHS of L intertroch fracture after mechanical fall.
 
-H/H is trending downward, but not more than expected given the extend of 
surgery. 
-Ok to restart pradaxa. Repeat h/h in am. 
-PT for mobilization. OOB to chair only. TTWB. Do not attempt ambulation. 
-Dry dressing change once daily. 
-Pain control with po meds.
-Bowel regimen. 
-Medical management by primary team.

## 2017-05-22 VITALS — SYSTOLIC BLOOD PRESSURE: 134 MMHG | DIASTOLIC BLOOD PRESSURE: 60 MMHG

## 2017-05-22 VITALS — SYSTOLIC BLOOD PRESSURE: 108 MMHG | DIASTOLIC BLOOD PRESSURE: 60 MMHG

## 2017-05-22 VITALS — DIASTOLIC BLOOD PRESSURE: 62 MMHG | SYSTOLIC BLOOD PRESSURE: 144 MMHG

## 2017-05-22 LAB
ABSOLUTE GRANULOCYTE CT: 9.2 /CUMM (ref 1.4–6.5)
BASOPHILS # BLD: 0 /CUMM (ref 0–0.2)
BASOPHILS NFR BLD: 0.3 % (ref 0–2)
EOSINOPHIL # BLD: 0.3 /CUMM (ref 0–0.7)
EOSINOPHIL NFR BLD: 2.5 % (ref 0–5)
ERYTHROCYTE [DISTWIDTH] IN BLOOD BY AUTOMATED COUNT: 15.7 % (ref 11.5–14.5)
GRANULOCYTES NFR BLD: 82.2 % (ref 42.2–75.2)
HCT VFR BLD CALC: 25 % (ref 37–47)
LYMPHOCYTES # BLD: 1.1 /CUMM (ref 1.2–3.4)
MCH RBC QN AUTO: 32.8 PG (ref 27–31)
MCHC RBC AUTO-ENTMCNC: 33.3 G/DL (ref 33–37)
MCV RBC AUTO: 98.6 FL (ref 81–99)
MONOCYTES # BLD: 0.6 /CUMM (ref 0.1–0.6)
PLATELET # BLD: 165 /CUMM (ref 130–400)
PMV BLD AUTO: 8.4 FL (ref 7.4–10.4)
RED BLOOD CELL CT: 2.53 /CUMM (ref 4.2–5.4)
WBC # BLD AUTO: 11.3 /CUMM (ref 4.8–10.8)

## 2017-05-22 NOTE — PN- HOUSESTAFF
IGOR WHITT,WILBER 17 0712:
Subjective
Follow-up For:
hip fracture with acute blood loss anemia
Subjective:
pain score over past 24 hrs were 0,2,0,3,2,6,2,3,4,0, given 1x tylenol po and 
1Xpercocet yesterday.
 
she had 3 large bm with suppository given yesterday. 
 
pradaxa has been resumed. her preop hb was around 11, and currently her hb 
continues to slowly trend down to 8.3, no obvious hematoma noted on the surgical
site. 
 
plan to be discharged to rehab today if has a bed. 
 
Review of Systems
Constitutional:
Reports: see HPI. 
 
Objective
Last 24 Hrs of Vital Signs/I&O
 Vital Signs
 
 
Date Time Temp Pulse Resp B/P B/P Pulse O2 O2 Flow FiO2
 
     Mean Ox Delivery Rate 
 
 0849  62  134/60     
 
 0848  62  134/60     
 
 0800      92 Nasal 3.0L 
 
       Cannula  
 
 0650 98.0 72 20 144/62  92 Nasal 3.0L 
 
       Cannula  
 
 0000       Nasal 3.0L 
 
       Cannula  
 
 2236 98.1 60 20 120/64  90 Nasal  
 
       Cannula  
 
 1600       Nasal 3.0L 
 
       Cannula  
 
 1529 98.0 62 20 120/60  99   
 
 1424    110/60     
 
 
 Intake & Output
 
 
  1600  0800  0000
 
Intake Total   960
 
Output Total  350 1450
 
Balance  -350 -490
 
    
 
Intake, Oral   960
 
Number   2
 
Bowel   
 
Movements   
 
Output, Urine  350 1450
 
Patient 102.512 kg  
 
Weight   
 
 
 
 
Physical Exam
General Appearance: Alert, Oriented X3, Cooperative, No Acute Distress
HEENT: Atraumatic
Cardiovascular: irregularly irregular rate, rate controlled
Lungs: Clear to Auscultation, Normal Air Movement
Abdomen: Normal Bowel Sounds, Soft, No Tenderness
Neurological: Normal Speech
Extremities: trace edema 
Current Medications:
 Current Medications
 
 
  Sig/Shauna Start time  Last
 
Medication Dose Route Stop Time Status Admin
 
Acetaminophen 650 MG Q4P PRN  0945 AC 
 
  PO   1423
 
Dabigatran 150 MG BID  1300 AC 
 
  PO   0848
 
Docusate Sodium 100 MG BID  1000 AC 
 
  PO   1123
 
Fluticasone  2 SPRAY DAILY  1000 AC 
 
Propionate  LEÓN   0847
 
Furosemide 40 MG DAILY  1230 AC 
 
  PO   0849
 
Hydromorphone HCl 1 MG Q6P PRN  1245 AC 
 
  PO   
 
Levothyroxine Sodium 0.175 MG DAILY AC  0700 AC 
 
  PO   0755
 
Losartan Potassium 25 MG DAILY  1230 AC 
 
  PO   0849
 
Metoprolol Succinate 50 MG DAILY  1000 AC 
 
  PO   0848
 
Omeprazole 40 MG DAILY AC  1214 DC 
 
  PO   
 
Ondansetron HCl 4 MG Q6P PRN  2300 AC 
 
  IV   1836
 
Oxycodone/ 1 TAB Q4P PRN  1300 AC 
 
Acetaminophen  PO   0920
 
Paroxetine HCl 40 MG DAILY  1000 AC 
 
  PO   0848
 
Polyethylene Glycol 17 GM DAILY  1000 AC 
 
  PO   1123
 
Pravastatin Sodium 10 MG 1700  1700 AC 
 
  PO   1634
 
Senna 187 MG AT BEDTIME  2200 AC 
 
  PO   
 
 
 
 
Last 24 Hrs of Lab/Kar Results
Last 24 Hrs of Labs/Mics:
 Laboratory Tests
 
17 0925:
Anion Gap 6, Estimated GFR > 60, BUN/Creatinine Ratio 20.0, CBC w Diff NO MAN 
DIFF REQ, RBC 2.53  L, MCV 98.6, MCH 32.8  H, RDW 15.7  H, MPV 8.4, Gran % 82.2 
H, Lymphocytes % 9.4  L, Monocytes % 5.6, Eosinophils % 2.5, Basophils % 0.3, 
Absolute Granulocytes 9.2  H, Absolute Lymphocytes 1.1  L, Absolute Monocytes 
0.6, Absolute Eosinophils 0.3, Absolute Basophils 0, PUBS MCHC 33.3
 
17 1715:
CBC w Diff NO MAN DIFF REQ, RBC 2.67  L, MCV 97.1, MCH 32.9  H, RDW 15.6  H, MPV
8.5, Gran % 77.7  H, Lymphocytes % 12.5  L, Monocytes % 8.4, Eosinophils % 1.2, 
Basophils % 0.2, Absolute Granulocytes 9.9  H, Absolute Lymphocytes 1.6, 
Absolute Monocytes 1.1  H, Absolute Eosinophils 0.2, Absolute Basophils 0, PUBS 
MCHC 33.9
 
 
Assessment/Plan
Assessment:
81-year-old with PMH of atrial fibrillation  on pradaxa, sick sinus s/p 
pacemaker, hypothyroidism, NONoxygen dependent COPD, pulmonary hypertension. 
 
# Left intertrochanteric fracture, s/p repair left comminuted intertrochanteric-
subtrochanteric proximal femur fracture with long cephalo-medullary nail.
# Constipation (last bm prior to admission was ) 
- last pradaxa taken prior to admision  am
- pradaxa was held for 48 hrs prior to surgery, no heparin bridging as per 
cardio
- Received 3 U prbc in the OR: Monitor H&H closely
* bowel regimen miralax senna colace, suppository X 1 , had bm 17 
* pain control with po tylenol, percocet, dilaudid. she has only been getting 
tylenol and perocet. ctpmp checked, shows no match/records. 
* pradaxa resumed 17 am , h/h still slowly trending down, no obvious 
hematoma at surgical site 
* appreciate cardio (Dr Sami Rivero) and ortho input (Dr. Louis)
* continue underwood 
* PT after surgery - bed to chair only, strict toe touch 
* incentive spirometry and trc
 
# JEANETTE post op
- most likely prerenal due to perioperative blood loss
* Cr was up to 1.1, now back to 0.8 
* lasix, losartan was held, now restarted
 
# Atrial fibrillation  on pradaxa, sick sinus s/p pacemaker, hypothyroidism, NON
oxygen dependent COPD
* continue home meds pravachol, paxil, metoprolol, synthroid, losartan, lasix 
 
pp: tylenol mild, percocet moderate, dilaudid severe, all po 
Diet: heart healthy
DVT prophylaxis: alps , pradaxa 
FC
 
Problem List:
 1. Intertrochanteric fracture of left hip
 
Pain Ratin
Pain Location:
none 
Pain Goal: Remain pain free
Pain Plan:
tylenol 
percocet
dilaudid 
Tomorrow's Labs & Rationales:
none 
DVT/Prophylaxis: mechanical, pharmacological
 
 
RACQUEL ARRIAGA 17 1155:
Attending MD Review Statement
 
Attending Statement
Attending MD Statement: examined this patient, discuss w/resident/PA/NP, agreed 
w/resident/PA/NP, discussed with family, reviewed EMR data (avail), discussed 
with nursing, discussed with case mgmt, reviewed images, amended to note
Attending Assessment/Plan:
Patient being admitted for left hip fracture for repair. Pain control, held 
pradaxa, orthopedics and cardiology consulted, gi/dvt prophyalxis, full code 
plan of care d/wed patient and family bedside. Patient underwent ORIF received 
blood transfuison for acute blood loss anemia. Patient on oxygen for post op 
atelecatsis, encouraged incentive spirometry. Patient pradaxa for afib 
controlled resumed as per Cardiology and ortho. F/U O/P PCP, cardio Dr DEVENDRA Casanova. 
anticipate d/c soon to rehab as per PT recommendations.

## 2017-05-22 NOTE — PN- ORTHOPEDIC
**See Addendum**
Subjective
Subjective:
The patient was seen this morning postoperatively day #3.  She reports that her 
hip is more comfortable than the day prior.  She only has complaints of right 
upper extremity pain from where they've attempted to start IV access and there 
is an area of ecchymosis.
 
Objective
Vital Signs and I&Os
Vital Signs
 
 
Date Time Temp Pulse Resp B/P B/P Pulse O2 O2 Flow FiO2
 
     Mean Ox Delivery Rate 
 
05/22 0650 98.0 72 20 144/62  92 Nasal 3.0L 
 
       Cannula  
 
05/22 0000       Nasal 3.0L 
 
       Cannula  
 
05/21 2236 98.1 60 20 120/64  90 Nasal  
 
       Cannula  
 
05/21 1600       Nasal 3.0L 
 
       Cannula  
 
05/21 1529 98.0 62 20 120/60  99   
 
05/21 1424    110/60     
 
05/21 1122    140/60     
 
05/21 0800      95 Nasal 3.0L 
 
       Cannula  
 
 
 Intake & Output
 
 
 05/22 0800 05/22 0000 05/21 1600 05/21 0800 05/21 0000 05/20 1600
 
Intake Total  960 1075   1100
 
Output Total 350 1450 400 400 600 
 
Balance -350 -490 675 -400 -600 1100
 
       
 
Intake, IV      800
 
Intake, Oral  960 1075   300
 
Number  2 1   
 
Bowel      
 
Movements      
 
Output, Urine 350 1450 400 400 600 
 
 
 
Physical Exam:
Gen.: Alert and in no obvious distress
Skin: Warm and dry
Extremities: Right forearm with significant area of ecchymosis and edema that is
tender to palpation
Bilateral lower extremities are warm without calf tenderness or significant 
edema.  Gross motor and sensory are intact.  Left hip surgical dressing is blood
-tinged but otherwise intact with mild surrounding ecchymosis.
 
Assessment/Plan
Assessment/Plan
Assessment: 81-year-old female status post left femoral IM at bedtime 
postoperative day #3.  The patient is progressing as expected and her pain is 
more adequately controlled at the current time.
 
Recommendations:
Warm compresses to right upper extremity
Out of bed to chair with physical therapy patient is toe-touch weightbearing 
only
Follow-up morning laboratory studies
GI and DVT prophylaxis continuing the patient's pradaxa
Incentive spirometry
Daily dry dressing change to surgical site
May be discharged to short-term rehabilitation when okay with primary team
Surgical clips should be removed on postoperative day #15 and the incision being
reinforced with Steri-Strips
The patient should follow-up in the office in approximately 3 weeks
Continue current pain regiment
Care per primary team

## 2017-05-22 NOTE — DISCHARGE SUMMARY
Visit Information
 
Visit Dates
Admission Date:
05/17/17
 
Discharge Date:
5/22/17
 
 
Hospital Course
 
Course
Attending Physician:
RACQUEL ARRIAGA MD
 
Primary Care Physician:
SHAYE WHITT,SHENG VICENTE
 
Consulting Request:
   Consulting Specialty: Orthopedics
Hospital Course:
This is a 81-year-old lady with a past medical history of chronic atrial 
fibrillation (anticoagulated with Pradaxa), sick sinus syndrome, status post 
pacemaker implantation 2 years prior, hypothyroidism and COPD not on home oxygen
, pulmonary hypertension with RV pressures greater than 50 on the last echo in 
2016, who presented to the [facility] after she had a mechanical fall 
while trying to ambulate with her walker.
 
 The patient slightly twisted her leg and landed up on her left hip.  The 
patient never lost consciousness, denied any dizziness, palpitations, chest pain
prior to the fall.
 
Ph/ex at the time of admission: 
 
 Vital Signs
 
 
Date Time Temp Pulse Resp B/P B/P Pulse O2 O2 Flow FiO2
 
     Mean Ox Delivery Rate 
 
05/17 1218 98.4 78 18 162/70  93 Room Air  
 
05/17 1040      93   
 
05/17 1013 97.9 85 20 178/107  93 Room Air  
 
 
General Appearance Alert, Oriented X3, Cooperative Skin No Rashes, No Breakdown 
Skin Temp/Moisture Exam: Warm/Dry Neck Supple, No JVD Lymphatic Axillary nl, 
Cervical nl Cardiovascular Normal S1, Normal S2 Lungs Clear to Auscultation, 
Normal Air Movement
Abdomen Normal Bowel Sounds, Soft, No Tenderness, No Hepatospenomegaly 
Neurological Normal Speech, Strength at 5/5 X4 Ext, Normal Tone Extremities left
leg shortened internally rotated
 
Labs on admission: WBC of 7.2, stable hemoglobin and hematocrit, Basic 
electrolyte panel unremarkable. INR 1.29
 
 
05/17/17 1030:
Anion Gap 11, Estimated GFR > 60, BUN/Creatinine Ratio 21.7, Glucose 104  H, 
Calcium 9.5, Total Bilirubin 0.7, AST 32, ALT 34, Alkaline Phosphatase 101, 
Troponin I < 0.01, Total Protein 7.2, Albumin 4.0, Globulin 3.2, Albumin/
Globulin Ratio 1.3, PT 13.5  H, INR 1.29  H, APTT 51  H, CBC w Diff NO MAN DIFF 
REQ, RBC 4.48, MCV 98.1, MCH 32.9  H, RDW 14.0, MPV 8.9, Gran % 58.8, 
Lymphocytes % 26.7, Monocytes % 10.8  H, Eosinophils % 3.4, Basophils % 0.3, 
Absolute Granulocytes 4.2, Absolute Lymphocytes 1.9, Absolute Monocytes 0.8  H, 
Absolute Eosinophils 0.2, Absolute Basophils 0, PUBS MCHC 33.5
 
Pertinent Imaging on admission: 
 
XRY-ANKLE 3 OR MORE VIEWS L; XRY-HIP 2-3 VIEWS, LEFT 5/17/17:
 
1. Acute comminuted and mildly impacted intertrochanteric fracture of the left 
proximal femur.
2. Evaluation of left ankle limited but there is evidence of prior open 
reduction internal fixation presumably of the distal fibular fracture. 
Arthrodesis of the hindfoot is also noted as discussed above. 3. Osteopenia.
 
Head CT 5/17/17: 1. There are no intracranial bleeds or territorial infarcts. 2.
There is likely a meningioma in the high right frontal region medially, 
unchanged. 3. There are changes consistent with chronic microvascular ischemic 
disease.
 
 
CXR 5/17/17: - Cardiomegaly without pulmonary edema. - No acute cardiopulmonary 
disease compared to 09/29/2016.
 
The following problems were addressed during the course of her hospital stay: 
 
# Comminuted left proximal femoral intertrochanteric-subtrochanteric fracture: s
/p repair left comminuted intertrochanteric-subtrochanteric proximal femur 
fracture with long cephalo-medullary nail.
 
- last pradaxa taken prior to admision 5/17 am,  pradaxa was held for 48 hrs 
prior to surgery, no heparin bridging as per cardio. Received 3 U prbc in the OR
, H&H monitored closely and remained stable. Incentive spirometry and TRC. PT 
recommended rehab. 
 
-Ortho recs post op: Warm compresses to right upper extremity,  Out of bed to 
chair with physical therapy patient is toe-touch weightbearing only, DVT 
prophylaxis continuing the patient's pradaxa, Incentive spirometry,  Daily dry 
dressing change to surgical site
 
* Surgical clips should be removed on postoperative day #15 and the incision 
being reinforced with Steri-Strips
* The patient should follow-up in the office in approximately 3 weeks
 
[facility] database checked. 
 
# Constipation;resolved. 
bowel regimen miralax senna colace, suppository 
 
# JEANETTE post op;resolved. 
- most likely prerenal due to perioperative blood loss. Cr was elevated to 1.1, 
now back to 0.6 
* lasix, losartan was held, now restarted
 
# Atrial fibrillation on pradaxa, sick sinus s/p pacemaker, hypothyroidism, NON 
oxygen dependent COPD
Maintained on home meds pravachol, metoprolol, synthroid, losartan, lasix, PRN 
TRC/Nebs. See above for pradaxa(resumed 5/21/17).
 
 
Diet: heart healthy
DVT prophylaxis: alps , pradaxa 
Full code
 
Allergies:
Coded Allergies:
coconut oil (ITCHING ALL OVER  09/19/16)
codeine ("MY BP GOES WAY DOWN" 09/19/16)
 
Significant Procedures:
05/19/17. Repair left comminuted intertrochanteric-subtrochanteric proximal 
femur fracture with long cephalo-medullary nail.
 
 
Hip Xray: 05/19/17:
IMPRESSION:
Intraoperative fluoroscopic guidance is provided during ORIF of a comminuted
proximal left femur intertrochanteric fracture. Please see Operative Report
for full procedural details.
 
 
 
Pertinent Lab Results:
Laboratory Tests
 
05/22/17 0925:
Anion Gap 6, Estimated GFR > 60, BUN/Creatinine Ratio 20.0, CBC w Diff NO MAN 
DIFF REQ, RBC 2.53  L, MCV 98.6, MCH 32.8  H, RDW 15.7  H, MPV 8.4, Gran % 82.2 
H, Lymphocytes % 9.4  L, Monocytes % 5.6, Eosinophils % 2.5, Basophils % 0.3, 
Absolute Granulocytes 9.2  H, Absolute Lymphocytes 1.1  L, Absolute Monocytes 
0.6, Absolute Eosinophils 0.3, Absolute Basophils 0, PUBS MCHC 33.3
 
05/21/17 1715:
CBC w Diff NO MAN DIFF REQ, RBC 2.67  L, MCV 97.1, MCH 32.9  H, RDW 15.6  H, MPV
8.5, Gran % 77.7  H, Lymphocytes % 12.5  L, Monocytes % 8.4, Eosinophils % 1.2, 
Basophils % 0.2, Absolute Granulocytes 9.9  H, Absolute Lymphocytes 1.6, 
Absolute Monocytes 1.1  H, Absolute Eosinophils 0.2, Absolute Basophils 0, PUBS 
MCHC 33.9
 
05/21/17 0750:
Anion Gap 4  L, Estimated GFR > 60, BUN/Creatinine Ratio 22.5, CBC w Diff NO MAN
DIFF REQ, RBC 2.78  L, MCV 97.6, MCH 32.8  H, RDW 15.7  H, MPV 9.0, Gran % 73.1,
Lymphocytes % 15.1  L, Monocytes % 10.6  H, Eosinophils % 1.0, Basophils % 0.2, 
Absolute Granulocytes 8.6  H, Absolute Lymphocytes 1.8, Absolute Monocytes 1.2  
H, Absolute Eosinophils 0.1, Absolute Basophils 0, PUBS MCHC 33.6
 
05/20/17 2025:
CBC w Diff NO MAN DIFF REQ, RBC 3.00  L, MCV 96.0, MCH 32.6  H, RDW 15.6  H, MPV
8.9, Gran % 77.0  H, Lymphocytes % 13.6  L, Monocytes % 9.2, Eosinophils % 0.1, 
Basophils % 0.1, Absolute Granulocytes 10.0  H, Absolute Lymphocytes 1.8, 
Absolute Monocytes 1.2  H, Absolute Eosinophils 0, Absolute Basophils 0, [facility] 
MCHC 34.0
 
05/20/17 0645:
Anion Gap 11, Estimated GFR 48  L, BUN/Creatinine Ratio 18.2, CBC w Diff MAN 
DIFF ORDERED, RBC 3.61  L, MCV 97.0, MCH 32.9  H, RDW 16.1  H, MPV 9.7, Gran % 
85.8  H, Lymphocytes % 8.8  L, Monocytes % 5.4, Eosinophils % 0, Basophils % 0  
L, Absolute Granulocytes 14.3  H, Segmented Neutrophils 76  H, Band Neutrophils 
10  H, Absolute Lymphocytes 1.5, Lymphocytes 10  L, Monocytes 4, Absolute 
Monocytes 0.9  H, Absolute Eosinophils 0, Absolute Basophils 0, Platelet 
Estimate VERIFIED BY SMEAR, Polychromasia 1+, Anisocytosis 1+, [facility] MCHC 34.0
 
05/19/17 2130:
Anion Gap 13, Estimated GFR 53  L, BUN/Creatinine Ratio 15.0, CBC w Diff NO MAN 
DIFF REQ, RBC 4.38, MCV 95.9, MCH 32.4  H, RDW 15.6  H, MPV 8.9, Gran % 87.3  H,
Lymphocytes % 7.8  L, Monocytes % 4.6, Eosinophils % 0.1, Basophils % 0.2, 
Absolute Granulocytes 13.4  H, Absolute Lymphocytes 1.2, Absolute Monocytes 0.7 
H, Absolute Eosinophils 0, Absolute Basophils 0, [facility] MCHC 33.8
 
 
Disposition Summary
 
Disposition
Principal Diagnosis:
Comminuted left proximal femoral intertrochanteric-subtrochanteric fracture.
Additional Diagnosis:
JEANETTE
A.fib on pradaxa
Discharge Disposition: SNF
 
Discharge Instructions
 
General Discharge Information
Code Status: Full Code
Patient's Diet:
Cardiac
Patient's Activity:
As tolerated w/ PT. See instructions. 
Follow-Up Instructions/Appts:
-Warm compresses to right upper extremity
-Out of bed to chair with physical therapy patient is toe-touch weightbearing 
only
- DVT prophylaxis continuing the patient's pradaxa
-Incentive spirometry  
-Daily dry dressing change to surgical site
-Surgical clips should be removed on postoperative day #15 and the incision 
being reinforced with Steri-Strips
-The patient should follow-up in the office with ortho in approximately 3 weeks
-Monitor H&H. Monitor for bleeeding around the surgical site. Check CBC as 
needed.
-Follow up with PCP within one week of discharge. 
 
 
Medications at Discharge
Discharge Medications:
Continue taking these medications:
Paroxetine HCl (Paroxetine HCl) 40 MG TABLET
    1 Tablet ORAL DAILY
 
Levothyroxine Sodium (Synthroid) 175 MCG TABLET
    1 Tablet ORAL DAILY BEFORE BREAKFAST
 
Metoprolol Succinate (Metoprolol Succinate) 50 MG TAB.ER.24H
    1 Tablet ORAL DAILY
 
Dabigatran Etexilate Mesylate (Pradaxa 150 MG) 150 MG CAPSULE
    1 Capsule ORAL TWICE DAILY
 
Losartan Potassium (Cozaar) 25 MG TABLET
    1 Tablet ORAL DAILY
 
Pravastatin Sodium (Pravastatin Sodium) 10 MG TABLET
    1 Tablet ORAL DAILY
    Qty = 90
    Comments:
       PER PT MED LIST
 
Multivitamin (Multi-Day Vitamins) 1 EACH TABLET
    1 Tablet ORAL DAILY
    Comments:
       PER PT
 
Vit A,C & E/Lutein/Minerals (Ocuvite With Lutein Tablet) 1 EACH TABLET
    1 Tablet ORAL DAILY
    Comments:
       PER PT MED LIST
 
Furosemide (Furosemide) 40 MG TABLET
    1 Tablet ORAL DAILY
 
Start taking the following new medications:
Oxycodone HCl/Acetaminophen (Percocet 5-325 MG Tablet) 5 MG-325 MG TABLET
    1 Tablet ORAL EVERY 4 HOURS AS NEEDED as needed for PAIN SCALE 4-6 (MODERATE
)
    Qty = 10
    No Refills
    Instructions:
       not to exceed more than 3000 mg of acetaminophen daily 
 
Acetaminophen (Tylenol) 325 MG TABLET
    650 Milligram ORAL EVERY 4 HOURS AS NEEDED as needed for PAIN SCALE 1-3 (
MILD)
    Days = 10
    No Refills
    Instructions:
       Not to exceed more than 3000 mg of acetaminophen daily 
 
Polyethylene Glycol 3350 (Miralax) 17 GRAM POWD.PACK
    1 Packet ORAL DAILY
    Qty = 30
    No Refills
    Instructions:
       dissolve in water
 
Docusate Sodium (Docusate Sodium) 100 MG CAPSULE
    1 Capsule ORAL TWICE DAILY
    Qty = 30
    No Refills
 
Sennosides (Senna) 8.6 MG TABLET
    2 Tablet ORAL DAILY
    Qty = 60
    No Refills
 
 
Copies To:
SHAYE WHITT,SHENG VICENTE

## 2017-06-28 ENCOUNTER — HOSPITAL ENCOUNTER (INPATIENT)
Dept: HOSPITAL 68 - ERH | Age: 82
LOS: 8 days | Discharge: NURSING, REHAB OR LONG-TERM CARE | DRG: 863 | End: 2017-07-06
Attending: INTERNAL MEDICINE | Admitting: INTERNAL MEDICINE
Payer: COMMERCIAL

## 2017-06-28 VITALS — BODY MASS INDEX: 40.12 KG/M2 | WEIGHT: 235 LBS | HEIGHT: 64 IN

## 2017-06-28 VITALS — DIASTOLIC BLOOD PRESSURE: 64 MMHG | SYSTOLIC BLOOD PRESSURE: 158 MMHG

## 2017-06-28 DIAGNOSIS — Z85.3: ICD-10-CM

## 2017-06-28 DIAGNOSIS — Z79.01: ICD-10-CM

## 2017-06-28 DIAGNOSIS — E78.5: ICD-10-CM

## 2017-06-28 DIAGNOSIS — E89.0: ICD-10-CM
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
## 2017-06-28 DIAGNOSIS — F32.9: ICD-10-CM

## 2017-06-28 DIAGNOSIS — Z99.81: ICD-10-CM

## 2017-06-28 DIAGNOSIS — D50.0: ICD-10-CM

## 2017-06-28 DIAGNOSIS — B96.4: ICD-10-CM

## 2017-06-28 DIAGNOSIS — R21: ICD-10-CM

## 2017-06-28 DIAGNOSIS — T81.4XXA: Primary | ICD-10-CM

## 2017-06-28 DIAGNOSIS — I11.0: ICD-10-CM

## 2017-06-28 DIAGNOSIS — J44.9: ICD-10-CM

## 2017-06-28 DIAGNOSIS — N39.0: ICD-10-CM
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
## 2017-06-28 DIAGNOSIS — S72.142D: ICD-10-CM

## 2017-06-28 DIAGNOSIS — I48.2: ICD-10-CM

## 2017-06-28 DIAGNOSIS — I27.2: ICD-10-CM

## 2017-06-28 DIAGNOSIS — I50.32: ICD-10-CM

## 2017-06-28 DIAGNOSIS — Z95.0: ICD-10-CM

## 2017-06-28 DIAGNOSIS — R19.7: ICD-10-CM

## 2017-06-28 LAB
ABSOLUTE GRANULOCYTE CT: 8.7 /CUMM (ref 1.4–6.5)
BASOPHILS # BLD: 0 /CUMM (ref 0–0.2)
BASOPHILS NFR BLD: 0.3 % (ref 0–2)
EOSINOPHIL # BLD: 0.1 /CUMM (ref 0–0.7)
EOSINOPHIL NFR BLD: 1.3 % (ref 0–5)
ERYTHROCYTE [DISTWIDTH] IN BLOOD BY AUTOMATED COUNT: 16.8 % (ref 11.5–14.5)
GRANULOCYTES NFR BLD: 78.1 % (ref 42.2–75.2)
HCT VFR BLD CALC: 36.4 % (ref 37–47)
LYMPHOCYTES # BLD: 1.3 /CUMM (ref 1.2–3.4)
MCH RBC QN AUTO: 31.3 PG (ref 27–31)
MCHC RBC AUTO-ENTMCNC: 31.8 G/DL (ref 33–37)
MCV RBC AUTO: 98.3 FL (ref 81–99)
MONOCYTES # BLD: 1 /CUMM (ref 0.1–0.6)
PLATELET # BLD: 381 /CUMM (ref 130–400)
PMV BLD AUTO: 7.9 FL (ref 7.4–10.4)
RED BLOOD CELL CT: 3.7 /CUMM (ref 4.2–5.4)
WBC # BLD AUTO: 11.2 /CUMM (ref 4.8–10.8)

## 2017-06-28 PROCEDURE — 2NASP: CPT

## 2017-06-28 PROCEDURE — 87070 CULTURE OTHR SPECIMN AEROBIC: CPT

## 2017-06-28 PROCEDURE — 87075 CULTR BACTERIA EXCEPT BLOOD: CPT

## 2017-06-28 PROCEDURE — C1769 GUIDE WIRE: HCPCS

## 2017-06-28 PROCEDURE — 87184 SC STD DISK METHOD PER PLATE: CPT

## 2017-06-28 NOTE — NUR
BIBA FROM [facility] FACILITY FOR TMAX 100.7 TODAY, C/O
LETHARGY/DYSURIA/HIP INCISION WITH PURULENT
DRAINAGE AND ERYTHEMA SURROUNDING WOUND DESPITE
INCISION BEING FULLY APPROXIMATED AND GENERALLY
HEALING WELL.

## 2017-06-28 NOTE — NUR
MOD PAGED REGARDING PT REQUESTS FOR PAIN MEDS; STATES HOUSE STAFF IS IN SIGNOUT
AND WILL ENTER ORDERS AFTER EVALUATING PT WHICH SHOULD BE DONE IN THE NEXT
15-30 MIN. PT/DTR AWARE.

## 2017-06-28 NOTE — NUR
PT AT CT SCAN, PER VERBAL ORDER FROM MEG KEENAN/DR OTT, MAY DRAW 2ND SET BLD CX
FROM LEFT SIDE (S/P MASTECTOMY 1997).

## 2017-06-28 NOTE — CONS- ORTHOPEDIC
General Information and HPI
 
Consulting Request
Date of Consult: 17
Requested By:
ALPA SALDANA MD
 
Reason for Consult:
Left hip surgical wound infection
Source of Information: patient, family
Exam Limitations: no limitations
History of Present Illness:
Ms. Jacobo is a 81-year-old woman, resident of area nursing home, with a past 
medical history of chronic atrial fibrillation (anticoagulated with Pradaxa), 
sick sinus syndrome, status post pacemaker implantation  2 years back, 
hypothyroidism and COPD on home oxygen as weLL all, pulmonary hypertension with 
RV pressures greater than 50 on the last echo in , who presented to the 
[facility] with temperature to 101.9, lethargy, left hip wound drainage (
status post ORIF left intertrochanteric hip fracture 2017), and complaints
of burning with urination for 2 days.  Since surgery the patient has been 
nonambulatory because of restrictions placed by Dr. Louis the operating 
orthopedic surgeon.  She states that the drainage in her left hip wound began a 
few days ago and has not become red and tender to touch at the skin.  CAT scan 
of the left thigh reveals a fluid collection in the left thigh at the site of 
the drainage.  Her urinalysis in the emergency room is also suggestive of 
urinary tract infection
 
Allergies/Medications
Allergies:
Coded Allergies:
coconut oil (ITCHING ALL OVER  16)
codeine ("MY BP GOES WAY DOWN" 16)
 
Home Med List:
Acetaminophen (Acephen) 650 MG SUPP.RECT   1 SUPP ID Q6H PRN PAIN/TEMP>101  (
Reported)
Acetaminophen (Tylenol) 325 MG TABLET   2 TAB PO Q6H PRN PAIN/TEMP>101  (
Reported)
Bisacodyl (Dulcolax) 10 MG SUPP.RECT   1 SUP RC DAILY PRN CONSTIPATION  (
Reported)
Dabigatran Etexilate Mesylate (Pradaxa 150 MG) 150 MG CAPSULE   1 CAP PO BID 
BLOOD THINNER  (Reported)
Docusate Sodium 100 MG CAPSULE   1 CAP PO BID constipation
Furosemide 40 MG TABLET   1 TAB PO DAILY CHF  (Reported)
Levothyroxine Sodium (Synthroid) 175 MCG TABLET   1 TAB PO DAILY AC THYROID  (
Reported)
Losartan Potassium (Cozaar) 25 MG TABLET   1 TAB PO DAILY BP  (Reported)
Magnesium Hydroxide (Milk Of Magnesia) 400 MG/5 ML ORAL.SUSP   30 ML PO Q3D PRN 
CONSTIPATION  (Reported)
Metoprolol Succinate 50 MG TAB.ER.24H   1 TAB PO DAILY BP  (Reported)
Multivitamin (Multi-Day Vitamins) 1 EACH TABLET   1 TAB PO DAILY SUPPLEMENT  (
Reported)
Na Phos,M-B/Na Phos,Di-Ba (Fleet Enema) 19 GRAM-7 GRAM/118 ML ENEMA   1 E RC 
DAILY PRN CONSTIPATION  (Reported)
Naloxone HCl (Narcan) 4 MG/ACTUATION SPRAY   4 MG LEÓN AD PRN OPIOID INDUCED 
RESP. DEPRESSIO  (Reported)
Oxycodone HCl/Acetaminophen (Percocet 5-325 MG Tablet) 5 MG-325 MG TABLET   2 
TAB PO Q4H PRN SEVERE PAIN  (Reported)
Oxycodone HCl/Acetaminophen (Percocet 5-325 MG Tablet) 5 MG-325 MG TABLET   1 
TAB PO Q4P PRN PAIN SCALE 4-6 (MODERATE)
     not to exceed more than 3000 mg of acetaminophen daily 
Paroxetine HCl 40 MG TABLET   1 TAB PO DAILY DEPRESSION  (Reported)
Polyethylene Glycol 3350 (Miralax) 17 GRAM POWD.PACK   1 PAC PO DAILY 
constipation
     dissolve in water
Pravastatin Sodium 10 MG TABLET   1 TAB PO DAILY CHOLESTEROL  (Reported)
Sennosides (Senna) 8.6 MG TABLET   2 TAB PO DAILY constipation 
Umeclidinium Brm/Vilanterol Tr (Anoro Ellipta 62.5-25 Mcg INH) 62.5 MCG-25 MCG/
ACTUATION BLST.W.DEV   1 PUFF INH DAILY RESPIRATORY  (Reported)
Vit A,C & E/Lutein/Minerals (Ocuvite With Lutein Tablet) 1 EACH TABLET   1 TAB 
PO DAILY SUPPLEMENT  (Reported)
 
 
Past History
 
Medical History
Neurological: NONE
EENT: NONE
Cardiovascular: AFIB, CHF, hypertension, hyperlipidemia, PACEMAKER CAROTID 
STENTS PER PT PACEMAKER pulmonary htn SICK SINUS
Respiratory: COPD
Gastrointestinal: NONE
Hepatic: NONE
Renal: NONE
Musculoskeletal: NONE
Psychiatric: NONE
Endocrine: HYPOTHYROID
Blood Disorders: NONE
Cancer(s): breast cancer
GYN/Reproductive: NONE
 
Surgical History
Pertinent Surgical History: STAUS POST THYROIDECTOMY s/p left ankle orif (Right 
CEA/ Appendectomy and ch)
 
Family History
Relations & Conditions If Any:
FATHER, .
SISTER
MOTHER
MOTHER
Relation not specified for:
  FH: CHF (congestive heart failure)
  FH: hypertension
  FH: kidney disease
 
 
Psychosocial History
Services at Home: None
Primary Language: English
 
Functional Ability
Ambulation: walker
 
Exam & Diagnostic Data
Vital Signs and I&O
Vital Signs
 
 
Date Time Temp Pulse Resp B/P B/P Pulse O2 O2 Flow FiO2
 
     Mean Ox Delivery Rate 
 
 1846 98.9 78 20 146/73  96 Room Air  
 
 1748 99.1 82 18 165/80  95 Room Air  
 
 1505      94   
 
 1458 99.3 74 20 161/77  94 Room Air  
 
 
 Intake & Output
 
 
  1600  0800  0000  1600  0800  0000
 
Intake Total      
 
Output Total 200     
 
Balance -200     
 
       
 
Output, Urine 200     
 
Patient 235 lb     
 
Weight      
 
Weight Reported by Patient     
 
Measurement      
 
Method      
 
 
 
 
Physical Exam
General Appearance: lethargic, mild distress
Head: normal appearance
Eyes:
Bilateral: PERRL. 
Respiratory: accessory muscle use
Cardiovascular: irregularly irregular
Peripheral Pulses:
4+ dorsalis pedis (R), 4+ dorsalis pedis (L)
Gastrointestinal: normal bowel sounds, soft, non-tender
Extremities: left hip is covered with a bandage that was removed, with purulent 
green drainage on dressing.  The area around the incision is tender to touch and
erythematous.  There is no calf tenderness distally.  There is no fluid 
expressed from the wound.  The distal thigh wound at the end of the locking nail
is clean dry and intact
Last 24 Hours of Labs:
 Laboratory Tests
 
 
 
 
 1807 1530
 
Chemistry  
 
  Lactic Acid Cancelled 
 
Urines  
 
  Urine Color (YEL,AMB,STR)  YEL
 
  Urine Clarity (CLEAR)  HAZY  H
 
  Urine pH (5.0 - 8.0)  6.5
 
  Ur Specific Gravity (1.001 - 1.035)  1.010
 
  Urine Protein (NEG,<30 MG/DL)  TRACE  H
 
  Urine Ketones (NEG)  NEG
 
  Urine Nitrite (NEG)  NEG
 
  Urine Bilirubin (NEG)  NEG
 
  Urine Urobilinogen (0.1  -  1.0 EU/dl)  2.0  H
 
  Ur Leukocyte Esterase (NEG)  MOD  H
 
  Ur Microscopic  SEDIMENT EXAMINED
 
  Urine RBC (0 - 5 /HPF)  10-15  H
 
  Urine WBC (0 - 2 /HPF)  15-25  H
 
  Ur Epithelial Cells (NONE,FEW)  OCCAS
 
  Urine Bacteria (NEG/NONE)  PACKD  H
 
  Urine Mucus (FEW,NONE)  RARE
 
  Urine Hemoglobin (NEG)  MOD  H
 
  Urine Glucose (N MG/DL)  NEG
 
 
 
 
 
 
 1525
 
Chemistry 
 
  Sodium (137 - 145 mmol/L) 135  L
 
  Potassium (3.5 - 5.1 mmol/L) 3.3  L
 
  Chloride (98 - 107 mmol/L) 91  L
 
  Carbon Dioxide (22 - 30 mmol/L) 31  H
 
  Anion Gap (5 - 16) 13
 
  BUN (7 - 17 mg/dL) 7
 
  Creatinine (0.5 - 1.0 mg/dL) 0.6
 
  Estimated GFR (>60 ml/min) > 60
 
  BUN/Creatinine Ratio (7 - 25 %) 11.7
 
  Glucose (65 - 99 mg/dL) 100  H
 
  Lactic Acid (0.7 - 2.1 mmol/L) 1.8
 
  Calcium (8.4 - 10.2 mg/dL) 9.2
 
  Total Bilirubin (0.2 - 1.3 mg/dL) 1.1
 
  AST (14 - 36 U/L) 27
 
  ALT (9 - 52 U/L) 24
 
  Alkaline Phosphatase (<127 U/L) 222  H
 
  Total Protein (6.3 - 8.2 g/dL) 7.1
 
  Albumin (3.5 - 5.0 g/dL) 3.7
 
  Globulin (1.9 - 4.2 gm/dL) 3.4
 
  Albumin/Globulin Ratio (1.1 - 2.2 %) 1.1
 
Hematology 
 
  CBC w Diff NO MAN DIFF REQ
 
  WBC (4.8 - 10.8 /CUMM) 11.2  H
 
  RBC (4.20 - 5.40 /CUMM) 3.70  L
 
  Hgb (12.0 - 16.0 G/DL) 11.6  L
 
  Hct (37 - 47 %) 36.4  L
 
  MCV (81.0 - 99.0 FL) 98.3
 
  MCH (27.0 - 31.0 PG) 31.3  H
 
  RDW (11.5 - 14.5 %) 16.8  H
 
  Plt Count (130 - 400 /CUMM) 381
 
  MPV (7.4 - 10.4 FL) 7.9
 
  Gran % (42.2 - 75.2 %) 78.1  H
 
  Lymphocytes % (20.5 - 51.1 %) 11.5  L
 
  Monocytes % (1.7 - 9.3 %) 8.8
 
  Eosinophils % (0 - 5 %) 1.3
 
  Basophils % (0.0 - 2.0 %) 0.3
 
  Absolute Granulocytes (1.4 - 6.5 /CUMM) 8.7  H
 
  Absolute Lymphocytes (1.2 - 3.4 /CUMM) 1.3
 
  Absolute Monocytes (0.10 - 0.60 /CUMM) 1.0  H
 
  Absolute Eosinophils (0.0 - 0.7 /CUMM) 0.1
 
  Absolute Basophils (0.0 - 0.2 /CUMM) 0
 
  PUBS MCHC (33.0 - 37.0 G/DL) 31.8  L
 
 
 
Imaging Results:
SERVICE DATE: 
EXAM TYPE: CAT - CT PELVIS W IV CONTRAST
 
EXAMINATION:
CT PELVIS WITH IV CONTRAST
 
CLINICAL INFORMATION:
Recent left hip fracture. Redness and discharge from the incision.
 
COMPARISON:
None.
 
TECHNIQUE:
Helical scanning was performed with submillimeter collimation through the
pelvis with 95 mL of Optiray 320 intravenous contrast. Sagittal and coronal
multiplanar 2-D reconstructions were obtained.
 
DLP:
800.72 mGy-cm
 
FINDINGS:
Status post left hip replacement. This causes streak artifact.
In the subcutaneous tissue at the left side of the hip there is a large fluid
collection. This measures about 27 cm of length and about 11 cm transverse by
6 cm AP. This has density measurement of 11 Hounsfield units, just above
simple fluid. There is now air in this collection. Differential diagnosis
would be a postoperative fluid collection/seroma and less likely abscess as
there is no air collection. This could be aspirated percutaneously.
There is no intrapelvic inflammation or fluid collections. Fat planes are
normal.
 
There is ventral wall hernia containing fat only at the lower umbilicus.
Vascular stent in the distal aorta extending into the iliac vessels.
There are a few diverticula of the left colon sigmoid but no diverticulitis.
No acute change of the visualized bowel. Bladder unremarkable.
 
There is degenerative disc disease of lower lumbar spine with disc height
narrowing and facet joint arthrosis. Degenerative joint disease of right hip
with joint space narrowing and spurring of acetabula.
 
IMPRESSION:
Status post left hip replacement. Large subcutaneous fluid collection in the
soft tissues lateral to the left hip. This could be aspirated percutaneously.
 
 
DICTATED BY: NILA PARKER MD 
DATE/TIME DICTATED:17
:IBETH 
DATE/TIME TRANSCRIBED:17
 
 
Assessment/Plan
Assessment/Plan
Ms. Jacobo is an 81-year-old female resident of [facility] nursing home, status post 
open reduction internal fixation of left intertrochanteric hip fracture on , presents with fever and lethargy and left hip wound drainage.  There is a
mild leukocytosis present and although she was sent in for a fever of 101.9 she 
is afebrile now.  Her vital signs are stable and not indicating fulminant sepsis
at this time.  She also has an underlying urinary analysis suggestive of urinary
tract infection.  This appears to be a surgical site infection based on CAT scan
results of the left thigh revealing fluid collection at the site of surgery.  
Dr. Fuentes is aware of this and suggest that antibiotics be held at this 
time and INR should aspirate this left hip fluid collection in a.m.  After that 
antibiotics should be started and definitive treatment, possible surgical 
washout, of the left hip will be discussed in a.m. by the orthopedic team and 
the medical team and the patient.  This was explained to the patient and the 
family member at the bedside in the emergency room and they were both in 
agreement.  Dr. Fuentes will evaluate the patient in the morning
 
 
Consult Acknowledgment
- Thank you for your consult request.

## 2017-06-28 NOTE — CT SCAN REPORT
EXAMINATION:
CT PELVIS WITH IV CONTRAST
 
CLINICAL INFORMATION:
Recent left hip fracture. Redness and discharge from the incision.
 
COMPARISON:
None.
 
TECHNIQUE:
Helical scanning was performed with submillimeter collimation through the
pelvis with 95 mL of Optiray 320 intravenous contrast. Sagittal and coronal
multiplanar 2-D reconstructions were obtained.
 
DLP:
800.72 mGy-cm
 
FINDINGS:
Status post left hip replacement. This causes streak artifact.
In the subcutaneous tissue at the left side of the hip there is a large fluid
collection. This measures about 27 cm of length and about 11 cm transverse by
6 cm AP. This has density measurement of 11 Hounsfield units, just above
simple fluid. There is now air in this collection. Differential diagnosis
would be a postoperative fluid collection/seroma and less likely abscess as
there is no air collection. This could be aspirated percutaneously.
There is no intrapelvic inflammation or fluid collections. Fat planes are
normal.
 
There is ventral wall hernia containing fat only at the lower umbilicus.
Vascular stent in the distal aorta extending into the iliac vessels.
There are a few diverticula of the left colon sigmoid but no diverticulitis.
No acute change of the visualized bowel. Bladder unremarkable.
 
There is degenerative disc disease of lower lumbar spine with disc height
narrowing and facet joint arthrosis. Degenerative joint disease of right hip
with joint space narrowing and spurring of acetabula.
 
IMPRESSION:
Status post left hip replacement. Large subcutaneous fluid collection in the
soft tissues lateral to the left hip. This could be aspirated percutaneously.

## 2017-06-28 NOTE — RADIOLOGY REPORT
EXAMINATION:
XR PORTABLE CHEST
 
CLINICAL INFORMATION:
Cough and congestion with fever.
 
COMPARISON:
05/17/2017, 05/27/2014.
 
TECHNIQUE:
Portable 85 degree upright view of the chest was obtained.
 
FINDINGS:
The cardiomediastinal silhouette is unremarkable.
Heart remains enlarged with a single pacer lead in the right ventricle with
the generator on the right.
The lungs and pleural spaces appear clear without evidence of congestion,
consolidation, or significant appearing effusion or atelectasis.
There is no evidence of pneumothorax or pulmonary edema.
Included osseous structures demonstrate degenerative changes in the right
shoulder and cervical spine.
 
IMPRESSION:
Cardiomegaly, no acute intrathoracic process is seen.

## 2017-06-28 NOTE — NUR
1ST SET BLD CX SENT WITH SST/LAV/BLUE/GRAY/PINK. ST CATH FOR 200CC CLEAR MED
YELLOW, URINE TRIO ALSO SENT.

## 2017-06-28 NOTE — RADIOLOGY REPORT
EXAMINATION:
XR HIP, LEFT
 
CLINICAL INFORMATION:
Follow-up hip fracture.
 
COMPARISON:
Preoperative plain films 05/17/2017, intraoperative films 05/19/2017.
CT scan performed earlier the same day.
 
TECHNIQUE:
Single view of the pelvis.
Two views of the left hip.
2 views of the femur.
 
FINDINGS:
There is diffuse osteopenia.
There is a long intramedullary estela with 2 interlocking screws distally and 2
compression nail proximally involving the left femur.
There is an oblique fracture line which remains visualized without
significant interval healing involving the proximal left femoral shaft with
1.8 cm of separation of fracture fragments on the lateral view anteriorly.
No other fractures are identified.
Please see the pelvis CT dictation for information regarding the large
percutaneous fluid collection laterally.
 
Biiliac metallic stents are identified.
 
IMPRESSION:
Postoperative changes with an oblique fracture through proximal femoral shaft
as noted. Comparison with any other postoperative exams is recommended.

## 2017-06-28 NOTE — ED GENERAL ADULT
History of Present Illness
 
General
Chief Complaint: General Adult
Stated Complaint: BIBA LETHARGY, FEVER
Source: patient, old records
Exam Limitations: no limitations
 
Vital Signs & Intake/Output
Vital Signs & Intake/Output
 Vital Signs
 
 
Date Time Temp Pulse Resp B/P B/P Pulse O2 O2 Flow FiO2
 
     Mean Ox Delivery Rate 
 
 2111 98.0 74 20 158/64  91 Room Air  
 
 1846 98.9 78 20 146/73  96 Room Air  
 
 1748 99.1 82 18 165/80  95 Room Air  
 
 1505      94   
 
 1458 99.3 74 20 161/77  94 Room Air  
 
 
Allergies
Coded Allergies:
coconut oil (ITCHING ALL OVER  16)
codeine ("MY BP GOES WAY DOWN" 16)
 
Reconcile Medications
Acetaminophen (Acephen) 650 MG SUPP.RECT   1 SUPP SD Q6H PRN PAIN/TEMP>101  (
Reported)
Acetaminophen (Tylenol) 325 MG TABLET   2 TAB PO Q6H PRN PAIN/TEMP>101  (
Reported)
Bisacodyl (Dulcolax) 10 MG SUPP.RECT   1 SUP RC DAILY PRN CONSTIPATION  (
Reported)
Dabigatran Etexilate Mesylate (Pradaxa 150 MG) 150 MG CAPSULE   1 CAP PO BID 
BLOOD THINNER  (Reported)
Docusate Sodium 100 MG CAPSULE   1 CAP PO BID constipation
Furosemide 40 MG TABLET   1 TAB PO DAILY CHF  (Reported)
Levothyroxine Sodium (Synthroid) 175 MCG TABLET   1 TAB PO DAILY AC THYROID  (
Reported)
Losartan Potassium (Cozaar) 25 MG TABLET   1 TAB PO DAILY BP  (Reported)
Magnesium Hydroxide (Milk Of Magnesia) 400 MG/5 ML ORAL.SUSP   30 ML PO Q3D PRN 
CONSTIPATION  (Reported)
Metoprolol Succinate 50 MG TAB.ER.24H   1 TAB PO DAILY BP  (Reported)
Multivitamin (Multi-Day Vitamins) 1 EACH TABLET   1 TAB PO DAILY SUPPLEMENT  (
Reported)
Na Phos,M-B/Na Phos,Di-Ba (Fleet Enema) 19 GRAM-7 GRAM/118 ML ENEMA   1 E RC 
DAILY PRN CONSTIPATION  (Reported)
Naloxone HCl (Narcan) 4 MG/ACTUATION SPRAY   4 MG LEÓN AD PRN OPIOID INDUCED 
RESP. DEPRESSIO  (Reported)
Oxycodone HCl/Acetaminophen (Percocet 5-325 MG Tablet) 5 MG-325 MG TABLET   2 
TAB PO Q4H PRN SEVERE PAIN  (Reported)
Oxycodone HCl/Acetaminophen (Percocet 5-325 MG Tablet) 5 MG-325 MG TABLET   1 
TAB PO Q4P PRN PAIN SCALE 4-6 (MODERATE)
     not to exceed more than 3000 mg of acetaminophen daily 
Paroxetine HCl 40 MG TABLET   1 TAB PO DAILY DEPRESSION  (Reported)
Polyethylene Glycol 3350 (Miralax) 17 GRAM POWD.PACK   1 PAC PO DAILY 
constipation
     dissolve in water
Pravastatin Sodium 10 MG TABLET   1 TAB PO DAILY CHOLESTEROL  (Reported)
Sennosides (Senna) 8.6 MG TABLET   2 TAB PO DAILY constipation 
Umeclidinium Brm/Vilanterol Tr (Anoro Ellipta 62.5-25 Mcg INH) 62.5 MCG-25 MCG/
ACTUATION BLST.W.DEV   1 PUFF INH DAILY RESPIRATORY  (Reported)
Vit A,C & E/Lutein/Minerals (Ocuvite With Lutein Tablet) 1 EACH TABLET   1 TAB 
PO DAILY SUPPLEMENT  (Reported)
 
Triage Note:
BIBA FROM [facility] FACILITY FOR TMAX 100.7 TODAY, C/O
LETHARGY/DYSURIA/HIP INCISION WITH PURULENT
DRAINAGE AND ERYTHEMA SURROUNDING WOUND DESPITE
INCISION BEING FULLY APPROXIMATED AND GENERALLY
HEALING WELL.
Triage Nurses Notes Reviewed? yes
Onset: Abrupt (`)
Duration: day(s): (1), constant
Timing: recent history
Injury Environment: home
Severity: moderate
Severity Numbers: 7
No Modifying Factors: none
Associated Symptoms: denies
HPI:
81-year-old female history of chronic atrial fibrillation (anticoagulated with 
Pradaxa), sick sinus syndrome, status post pacemaker implantation 2 years prior,
hypothyroidism and COPD not on home oxygen, pulmonary hypertension, recent left 
hip arthroplasty presents brought in from nursing home after the patient was 
noted to have a fever of 100.7 increased lethargy and drainage redness around 
her surgical incision.  Patient had outpatient x-rays of her hip and chest 
performed yesterday which were unremarkable she had bloodwork which revealed a 
normal white blood cell count.  She denies chest pain abdominal pain nausea 
vomiting diarrhea.  The patient has a baseline history of urinary incontinence 
no urinary complaints.  She is otherwise without any complaints at this time
 
(REE WEAVER,JAMIE)
 
Past History
 
Travel History
Traveled to [place] past 21 day No
 
Medical History
Any Pertinent Medical History? see below for history
Neurological: NONE
EENT: NONE
Cardiovascular: AFIB, CHF, hypertension, hyperlipidemia, PACEMAKER CAROTID 
STENTS PER PT PACEMAKER pulmonary htn SICK SINUS
Respiratory: COPD
Gastrointestinal: NONE
Hepatic: NONE
Renal: NONE
Musculoskeletal: NONE
Psychiatric: NONE
Endocrine: HYPOTHYROID
Blood Disorders: NONE
Cancer(s): breast cancer
GYN/Reproductive: NONE
History of MRSA: No
History of VRE: No
History of CDIFF: No
 
Surgical History
Surgical History: STAUS POST THYROIDECTOMY s/p left ankle orif (Right CEA/ 
Appendectomy and ch)
 
Psychosocial History
Who do you live with Spouse
Services at Home None
What is your primary language English
Tobacco Use: Quit >30 days ago
 
Family History
Family History, If Any:
FATHER, .
SISTER
MOTHER
MOTHER
Relation not specified for:
  FH: CHF (congestive heart failure)
  FH: hypertension
  FH: kidney disease
 
Hx Contributory? No
(JAMIE PAULINO)
 
Review of Systems
 
Review of Systems
Constitutional:
Reports: see HPI. 
All Other Systems: Reviewed and Negative
Comments
Review of systems: See HPI, All other systems negative.
Constitutional, no chills no fever, no malaise no weight loss
HEENT: No visual changes no sore throat no congestion, no ear pain
Cardiovascular: No chest pain , no palpitation , no orthopnea 
Skin:  no rashes, no change in skin
Respiratory: No dyspnea no cough no  sputum no  hemoptysis
GI: No nausea no vomiting, no diarrhea, no bloating/constipation
: No dysuria No hematuria, no frequency, no discharge
Muscle skeletal: No joint pain, no joint swelling, no back pain, no neck pain,
Neurologic: No numbness no confusion, no headache
Psych: No stress no  depression,.
Heme/endocrine: No bruising no bleeding
Immunology: No lymphadenopathy
(JAMIE PAULINO)
 
Physical Exam
 
Physical Exam
General Appearance: well developed/nourished, alert, awake
Comments:
Well-developed well-nourished person in no acute distress
HEENT: Normal EENT exam; PERRL, EOMI, HEAD is atraumatic. moist mucous 
membranes.  
Neck: Supple, normal range of motions
Back: Nontender Full range of motion
Cardiovascular: Regular rate and rhythms no murmurs rubs 
Respiratory: Chest nontender.There were no bony deformities, no asymmetry. No 
respiratory distress.  Patient speaking in full complete sentences. Breath 
sounds clear to auscultation bilaterally: NO W/R/R
Abdomen: Soft, nontender nondistended, no appreciable organomegaly. Normal bowel
sounds. No rebound/guarding, No appreciable enlargement of the abdominal aorta, 
No ascites.
Extremity: There is moderate erythema, WARMTH and weeping clear discharge from 
the proximal aspect of the left lateral incision, tender to palpation, No 
crepitus, patient has full range motion of the hip , full range of motion of 
extremities, normal and equal pulses bilaterally, 5 out of 5 strength noted to 
bilateral upper and lower extremities
Neuro: Alert oriented x3, motor sensory normal,  There were no obvious focal 
neurologic abnormalities.
Skin: No appreciable rash on exposed skin, skin is warm and dry.
Psych: Mood and affect is normal, memory and judgment is normal.
 
Core Measures
ACS in differential dx? No
CVA/TIA Diagnosis: No
Severe Sepsis Present: No
Septic Shock Present: No
(REE WEAVER,JAMIE)
 
Progress
Differential Diagnoses
I considered the following diagnoses in my evaluation of the patient: Cellulitis
UTI pneumonia electrode abnormality
 
Plan of Care:
 Orders
 
 
Procedure Date/time Status
 
Heart Healthy Diet  B Active
 
Vital Signs 2104 Active
 
Teach/Educate 2104 Active
 
Pain Treatment and Response 2104 Active
 
Nutritional Intake, Monitor 2104 Active
 
Isolation 2104 Active
 
Intake & Output  210 Active
 
Patient Care Conference 2104 Active
 
Activity/Ambulation 2104 Active
 
Patient Data  1852 Active
 
ED Holding Orders  1842 Active
 
Admit to inpatient  1842 Active
 
Vital Signs  1842 Active
 
Code Status  1842 Active
 
Intake & Output  1543 Active
 
Straight Cath  1507 Active
 
CULTURE,URINE  1507 Active
 
BLOOD CULTURE  1507 Active
 
URINALYSIS  1507 Complete
 
LACTIC ACID  1507 Complete
 
COMPREHENSIVE METABOLIC PANEL  1507 Complete
 
CBC WITHOUT DIFFERENTIAL  1507 Complete
 
 
 Laboratory Tests
 
 
 
17 1807:
Lactic Acid Cancelled
 
17 1530:
Urine Color YEL, Urine Clarity HAZY  H, Urine pH 6.5, Ur Specific Gravity 1.010,
Urine Protein TRACE  H, Urine Ketones NEG, Urine Nitrite NEG, Urine Bilirubin 
NEG, Urine Urobilinogen 2.0  H, Ur Leukocyte Esterase MOD  H, Ur Microscopic 
SEDIMENT EXAMINED, Urine RBC 10-15  H, Urine WBC 15-25  H, Ur Epithelial Cells 
OCCAS, Urine Bacteria PACKD  H, Urine Mucus RARE, Urine Hemoglobin MOD  H, Urine
Glucose NEG
 
17 1525:
Anion Gap 13, Estimated GFR > 60, BUN/Creatinine Ratio 11.7, Glucose 100  H, 
Lactic Acid 1.8, Calcium 9.2, Total Bilirubin 1.1, AST 27, ALT 24, Alkaline 
Phosphatase 222  H, Total Protein 7.1, Albumin 3.7, Globulin 3.4, Albumin/
Globulin Ratio 1.1, CBC w Diff NO MAN DIFF REQ, RBC 3.70  L, MCV 98.3, MCH 31.3 
H, RDW 16.8  H, MPV 7.9, Gran % 78.1  H, Lymphocytes % 11.5  L, Monocytes % 8.8,
Eosinophils % 1.3, Basophils % 0.3, Absolute Granulocytes 8.7  H, Absolute 
Lymphocytes 1.3, Absolute Monocytes 1.0  H, Absolute Eosinophils 0.1, Absolute 
Basophils 0, PUBS MCHC 31.8  L
 Microbiology
 1740  BLOOD: Blood Culture - RECD
 1530  URINE ROUT: Urine Culture - RECD
 1525  BLOOD: Blood Culture - RECD
 
Labs ordered old records reviewed case discussed with Dr. Eng the x-rays were 
reviewed from yesterday the chest x-ray showed no significant acute lesion seen 
no acute consolidation.  The left hip x-ray showed no clinically significant 
acute displaced fracture prior left hip orif  changes stable
 
 
CAT scan ordered calls placed to the patient's orthopedist Dr. Louis.
 
 
 
d/.w dr orr he advised to withhold antibiotics at this time patient will 
need IR drainage possible seroma versus abscess, given other medical conditions 
advised patient should be admitted to the house staff I spoke with Dr. mason
will admit
 
I Spoke with surgical pa bjorn will consult
 
I spoke with the patient and her daughter at length all her results.
I discussed with the patient at length all of their results.  I answered all of 
their questions, they feel comfortable with the plan and follow-up care. 
 
(REE WEAVER,JAMIE)
Diagnostic Imaging:
Viewed by Me: Radiology Read, CT Scan.  Discussed w/RAD: Radiology Read, CT 
Scan. 
Radiology Impression: PATIENT: ROMMEL HENDERSON  MEDICAL RECORD NO: [id number] 
PRESENT AGE: 81  PATIENT ACCOUNT NO: [id number] : 35  LOCATION: [facility] 
ORDERING PHYSICIAN: JAMIE WEAVER     SERVICE DATE:  EXAM TYPE: 
RAD - XRY-PORTABLE CHEST XRAY EXAMINATION: XR PORTABLE CHEST CLINICAL 
INFORMATION: Cough and congestion with fever. COMPARISON: 2017, 2014. TECHNIQUE: Portable 85 degree upright view of the chest was obtained. 
FINDINGS: The cardiomediastinal silhouette is unremarkable. Heart remains 
enlarged with a single pacer lead in the right ventricle with the generator on 
the right. The lungs and pleural spaces appear clear without evidence of 
congestion, consolidation, or significant appearing effusion or atelectasis. 
There is no evidence of pneumothorax or pulmonary edema. Included osseous 
structures demonstrate degenerative changes in the right shoulder and cervical 
spine. IMPRESSION: Cardiomegaly, no acute intrathoracic process is seen. 
DICTATED BY: FILIPE ENGEL MD  DATE/TIME DICTATED:17 
:RAD.[place]  DATE/TIME TRANSCRIBED:17 CONFIDENTIAL, 
DO NOT COPY WITHOUT APPROPRIATE AUTHORIZATION.  <Electronically signed in Other 
Vendor System>                                                                  
                     SIGNED BY: FILIPE ENGEL MD 17 1614, PATIENT: 
ROMMEL HENDERSON  MEDICAL RECORD NO: [id number] PRESENT AGE: 81  PATIENT ACCOUNT NO
: [id number] : 35  LOCATION: [facility] ORDERING PHYSICIAN: AJMIE WEAVER     
SERVICE DATE:  EXAM TYPE: CAT - CT PELVIS W IV CONTRAST EXAMINATION
: CT PELVIS WITH IV CONTRAST CLINICAL INFORMATION: Recent left hip fracture. 
Redness and discharge from the incision. COMPARISON: None. TECHNIQUE: Helical 
scanning was performed with submillimeter collimation through the pelvis with 95
mL of Optiray 320 intravenous contrast. Sagittal and coronal multiplanar 2-D 
reconstructions were obtained. DLP: 800.72 mGy-cm FINDINGS: Status post left hip
replacement. This causes streak artifact. In the subcutaneous tissue at the left
side of the hip there is a large fluid collection. This measures about 27 cm of 
length and about 11 cm transverse by 6 cm AP. This has density measurement of 11
Hounsfield units, just above simple fluid. There is now air in this collection. 
Differential diagnosis would be a postoperative fluid collection/seroma and less
likely abscess as there is no air collection. This could be aspirated 
percutaneously. There is no intrapelvic inflammation or fluid collections. Fat 
planes are normal. There is ventral wall hernia containing fat only at the lower
umbilicus. Vascular stent in the distal aorta extending into the iliac vessels. 
There are a few diverticula of the left colon sigmoid but no diverticulitis. No 
acute change of the visualized bowel. Bladder unremarkable. There is 
degenerative disc disease of lower lumbar spine with disc height narrowing and 
facet joint arthrosis. Degenerative joint disease of right hip with joint space 
narrowing and spurring of acetabula. IMPRESSION: Status post left hip 
replacement. Large subcutaneous fluid collection in the soft tissues lateral to 
the left hip. This could be aspirated percutaneously. DICTATED BY: NILA PARKER MD  DATE/TIME DICTATED:17 :RAD.[place]  DATE/TIME 
TRANSCRIBED:17 CONFIDENTIAL, DO NOT COPY WITHOUT APPROPRIATE 
AUTHORIZATION.  <Electronically signed in Other Vendor System>                  
                                                                     SIGNED BY: 
NILA PARKER MD 17
Initial ED EKG: none
(JAMIE PAULINO)
 
Departure
 
Departure
Time of Disposition: 
Condition: Stable
Clinical Impression
Primary Impression: Infected wound
Secondary Impressions: Cellulitis
Referrals:
VIANNEY AYALA MD (PCP/Family)
 
Departure Forms:
Customer Survey
General Discharge Information
 
Admission Note
Spoke With:
ALPA SALDANA MD
Documentation of Exam:
Documentation of any treatments & extenuating circumstances including Concerns 
Regarding Discharge (functional status, medication knowledge or non-compliance, 
living conditions, etc.) that warrant an admission rather than observation: 
Trend labs cultures patient will need IR drainage, wound cultures premature 
discharge and be medically harmful orthopedic consult
 
(JAMIE PAULINO)
 
Departure
Disposition: STILL A PATIENT
 
PA/NP Co-Sign Statement
Statement:
ED Attending supervision documentation-
 
[X] I saw and evaluated the patient. I have also reviewed all the pertinent lab 
results and diagnostic results. I agree with the findings and the plan of care 
as documented in the PA's/NP's documentation. 
 
[X] I have reviewed the ED Record and agree with the PA's/NP's documentation.
 
[] Additions or exceptions (if any) to the PAs/NP's note and plan are 
summarized below:
[]
 
(NAMRATA WHITT,JOE)
 
Critical Care Note
 
Critical Care Note
Critical Care Time: non-applicable
(JAMIE PAULINO)

## 2017-06-28 NOTE — HISTORY & PHYSICAL
YARA WHITT,[facility] 17:
General Information and [facility]
MD Statement:
I have seen and personally examined ROMMEL JACOBO and documented this H&P.
 
The patient is a 81 year old F who presented with a patient stated chief 
complaint of [fever, left surgical wound induration].
 
Source of Information: patient, family
Exam Limitations: no limitations
History of Present Illness:
Ms. Jacobo is 81 year old female with chief complaint of fever 100.7 and left 
surgical wound induration.  Patient has past medical history significant for 
atrial fibrillation on paradaxa, sinus sick syndrome status post pacemaker, 
hypothyroidism, COPD not on home oxygen, pulmonary hypertension, recent left hip
arthroplasty for intertrochanteric and subtrochanteric femur fracture in May 
2017 for which she was discharged to [facility].  History was 
obtained from the patient, her daughter Judy and the nurse at [facility].  
Patient reported that for the last week she has been feeling weak and fatigued 
and was struggling with the physical therapy, developed some low-grade fevers, 
reported chills, night sweats, headache, chronic cough, left hip pain radiating 
to left groin and and lower back.  According to the nurse chest x-ray and lab 
works were done and all negative, patient was started on urology protocol which 
means close monitoring for any signs of UTI or infection.  2 days prior to 
admission patient started to complain of dysuria, urinary incontinence.  Patient
denied any weakness, numbness or tingling sensation in lower extremity or upper 
extremity.
The nurse mentioned that after discharge the left surgical wound was draining 
large amount of serosergenious drainage that needs change every 4-6 hours, 2 
weeks after the surgery the drainage subsided and clips were removed.  One week 
ago around Thursday/Friday surgical incision noticed to be erythematous, warm to
touch, scant amount of serous drainage was noticed from the distal end of the 
wound but no blood.  No history of vital signs abnormality except for low-grade 
fever. 
History of fall in the nursing home, no history of seizure or change in 
mentation.  No history of chest pain, palpitation, shortness of breath, 
abdominal pain, nausea or vomiting.
Patient is toe-touch weightbearing on left leg and ambulate with walker with 
assistance of 1.
Last time patient received paradaxa was  at 9 AM.
 
Allergies/Medications
Allergies:
Coded Allergies:
coconut oil (ITCHING ALL OVER  16)
codeine ("MY BP GOES WAY DOWN" 16)
 
Home Med list
Acetaminophen (Acephen) 650 MG SUPP.RECT   1 SUPP NY Q6H PRN PAIN/TEMP>101  (
Reported)
Acetaminophen (Tylenol) 325 MG TABLET   2 TAB PO Q6H PRN PAIN/TEMP>101  (
Reported)
Bisacodyl (Dulcolax) 10 MG SUPP.RECT   1 SUP RC DAILY PRN CONSTIPATION  (
Reported)
Dabigatran Etexilate Mesylate (Pradaxa 150 MG) 150 MG CAPSULE   1 CAP PO BID 
BLOOD THINNER  (Reported)
Docusate Sodium 100 MG CAPSULE   1 CAP PO BID constipation
Furosemide 40 MG TABLET   1 TAB PO DAILY CHF  (Reported)
Levothyroxine Sodium (Synthroid) 175 MCG TABLET   1 TAB PO DAILY AC THYROID  (
Reported)
Losartan Potassium (Cozaar) 25 MG TABLET   1 TAB PO DAILY BP  (Reported)
Magnesium Hydroxide (Milk Of Magnesia) 400 MG/5 ML ORAL.SUSP   30 ML PO Q3D PRN 
CONSTIPATION  (Reported)
Metoprolol Succinate 50 MG TAB.ER.24H   1 TAB PO DAILY BP  (Reported)
Multivitamin (Multi-Day Vitamins) 1 EACH TABLET   1 TAB PO DAILY SUPPLEMENT  (
Reported)
Na Phos,M-B/Na Phos,Di-Ba (Fleet Enema) 19 GRAM-7 GRAM/118 ML ENEMA   1 E RC 
DAILY PRN CONSTIPATION  (Reported)
Naloxone HCl (Narcan) 4 MG/ACTUATION SPRAY   4 MG LEÓN AD PRN OPIOID INDUCED 
RESP. DEPRESSIO  (Reported)
Oxycodone HCl/Acetaminophen (Percocet 5-325 MG Tablet) 5 MG-325 MG TABLET   2 
TAB PO Q4H PRN SEVERE PAIN  (Reported)
Oxycodone HCl/Acetaminophen (Percocet 5-325 MG Tablet) 5 MG-325 MG TABLET   1 
TAB PO Q4P PRN PAIN SCALE 4-6 (MODERATE)
     not to exceed more than 3000 mg of acetaminophen daily 
Paroxetine HCl 40 MG TABLET   1 TAB PO DAILY DEPRESSION  (Reported)
Polyethylene Glycol 3350 (Miralax) 17 GRAM POWD.PACK   1 PAC PO DAILY 
constipation
     dissolve in water
Pravastatin Sodium 10 MG TABLET   1 TAB PO DAILY CHOLESTEROL  (Reported)
Sennosides (Senna) 8.6 MG TABLET   2 TAB PO DAILY constipation 
Umeclidinium Brm/Vilanterol Tr (Anoro Ellipta 62.5-25 Mcg INH) 62.5 MCG-25 MCG/
ACTUATION BLST.W.DEV   1 PUFF INH DAILY RESPIRATORY  (Reported)
Vit A,C & E/Lutein/Minerals (Ocuvite With Lutein Tablet) 1 EACH TABLET   1 TAB 
PO DAILY SUPPLEMENT  (Reported)
 
 
Past History
 
Travel History
Traveled to [place] past 21 day No
 
Medical History
Neurological: NONE
EENT: NONE
Cardiovascular: AFIB, CHF, hypertension, hyperlipidemia, PACEMAKER CAROTID 
STENTS PER PT PACEMAKER pulmonary htn SICK SINUS
Respiratory: COPD
Gastrointestinal: NONE
Hepatic: NONE
Renal: NONE
Musculoskeletal: NONE
Psychiatric: NONE
Endocrine: HYPOTHYROID
Blood Disorders: NONE
Cancer(s): breast cancer
GYN/Reproductive: NONE
History of MRSA: No
History of VRE: No
History of CDIFF: No
Isolation History: Standard
 
Surgical History
Surgical History: STAUS POST THYROIDECTOMY s/p left ankle orif (Right CEA/ 
Appendectomy and ch)
 
Past Family/Social History
 
Family History
Relations & Conditions if any
FATHER, .
SISTER
MOTHER
MOTHER
Relation not specified for:
  FH: CHF (congestive heart failure)
  FH: hypertension
  FH: kidney disease
 
 
Psychosocial History
Services at Home: None
Primary Language: English
 
Functional Ability
Ambulation: walker
 
Review of Systems
 
Review of Systems
Constitutional:
Reports: see HPI. 
 
Exam & Diagnostic Data
Last 24 Hrs of Vital Signs/I&O
 Vital Signs
 
 
Date Time Temp Pulse Resp B/P B/P Pulse O2 O2 Flow FiO2
 
     Mean Ox Delivery Rate 
 
 2111 98.0 74 20 158/64  91 Room Air  
 
 1846 98.9 78 20 146/73  96 Room Air  
 
 1748 99.1 82 18 165/80  95 Room Air  
 
 1505      94   
 
 1458 99.3 74 20 161/77  94 Room Air  
 
 
 Intake & Output
 
 
  0800  0000  1600
 
Intake Total   
 
Output Total  75 200
 
Balance  -75 -200
 
    
 
Output, Urine  75 200
 
Patient  106.594 kg 106.594 kg
 
Weight   
 
Weight  Reported by Patient Reported by Patient
 
Measurement   
 
Method   
 
 
 
 
Physical Exam
General Appearance Alert, Oriented X3, Cooperative, No Acute Distress
Skin No Rashes, left surgical incision erythema, arm, indurated, tender   with 
serious discharge
Skin Temp/Moisture Exam: Warm/Dry
HEENT Atraumatic, PERRLA, EOMI, Mucous Membr. moist/pink
Neck Supple
Lymphatic no cervical lymphadenopathy 
Cardiovascular Normal S1, Normal S2, No Murmurs, irrigular 
Lungs Clear to Auscultation, Normal Air Movement
Abdomen Normal Bowel Sounds, Soft, No Tenderness, No Hepatospenomegaly, No 
Masses
Neurological Normal Speech, Strength at 5/5 X4 Ext, Normal Tone, Sensation 
Intact, Cranial Nerves 3-12 NL, Reflexes 2+
Extremities No Clubbing, No Cyanosis, No Edema, Normal Pulses, No Tenderness/
Swelling
Last 24 Hrs of Labs/Kar:
 Laboratory Tests
 
17 1807:
Lactic Acid Cancelled
 
17 1530:
Urine Color YEL, Urine Clarity HAZY  H, Urine pH 6.5, Ur Specific Gravity 1.010,
Urine Protein TRACE  H, Urine Ketones NEG, Urine Nitrite NEG, Urine Bilirubin 
NEG, Urine Urobilinogen 2.0  H, Ur Leukocyte Esterase MOD  H, Ur Microscopic 
SEDIMENT EXAMINED, Urine RBC 10-15  H, Urine WBC 15-25  H, Ur Epithelial Cells 
OCCAS, Urine Bacteria PACKD  H, Urine Mucus RARE, Urine Hemoglobin MOD  H, Urine
Glucose NEG
 
17 1525:
Anion Gap 13, Estimated GFR > 60, BUN/Creatinine Ratio 11.7, Glucose 100  H, 
Lactic Acid 1.8, Calcium 9.2, Total Bilirubin 1.1, AST 27, ALT 24, Alkaline 
Phosphatase 222  H, Total Protein 7.1, Albumin 3.7, Globulin 3.4, Albumin/
Globulin Ratio 1.1, CBC w Diff NO MAN DIFF REQ, RBC 3.70  L, MCV 98.3, MCH 31.3 
H, RDW 16.8  H, MPV 7.9, Gran % 78.1  H, Lymphocytes % 11.5  L, Monocytes % 8.8,
Eosinophils % 1.3, Basophils % 0.3, Absolute Granulocytes 8.7  H, Absolute 
Lymphocytes 1.3, Absolute Monocytes 1.0  H, Absolute Eosinophils 0.1, Absolute 
Basophils 0, PUBS MCHC 31.8  L
 Microbiology
 1740  BLOOD: Blood Culture - RECD
 1530  URINE ROUT: Urine Culture - RECD
 1525  BLOOD: Blood Culture - RECD
 
 
 
Diagnostic Data
CXR Results
Chest x ray 
IMPRESSION:
Cardiomegaly, no acute intrathoracic process is seen.
Pelvis, hip and femur x ray 
IMPRESSION:
Postoperative changes with an oblique fracture through proximal femoral shaft
as noted. Comparison with any other postoperative exams is recommended.
 
CT pelvis with IV contrast 
FINDINGS:
Status post left hip replacement. This causes streak artifact.
In the subcutaneous tissue at the left side of the hip there is a large fluid
collection. This measures about 27 cm of length and about 11 cm transverse by
6 cm AP. This has density measurement of 11 Hounsfield units, just above
simple fluid. There is now air in this collection. Differential diagnosis
would be a postoperative fluid collection/seroma and less likely abscess as
there is no air collection. This could be aspirated percutaneously.
There is no intrapelvic inflammation or fluid collections. Fat planes are
normal.
 
There is ventral wall hernia containing fat only at the lower umbilicus.
Vascular stent in the distal aorta extending into the iliac vessels.
There are a few diverticula of the left colon sigmoid but no diverticulitis.
No acute change of the visualized bowel. Bladder unremarkable.
 
There is degenerative disc disease of lower lumbar spine with disc height
narrowing and facet joint arthrosis. Degenerative joint disease of right hip
with joint space narrowing and spurring of acetabula.
 
IMPRESSION:
Status post left hip replacement. Large subcutaneous fluid collection in the
soft tissues lateral to the left hip. This could be aspirated percutaneously.
 
 
 
Assessment/Plan
Assessment:
Ms. Jacobo is 81 year old female past medical history significant for atrial 
fibrillation on paradaxa administrated  at 9 AM, sinus sick syndrome status 
post pacemaker, hypothyroidism, COPD not on home oxygen, pulmonary hypertension,
recent left hip arthroplasty for intertrochanteric and subtrochanteric femur 
fracture in May 2017 for which she was discharged to [facility]. 
Patient BIBA from nursing home for evaluation of developed a fever of 100.7 and 
left surgical wound with induration and scanty serous discharge.  Patient has 1 
week history of weakness, low-grade fever, wound induration and 2 days history 
of dysuria and urinary incontinence. Labs significant for mild leukocytosis 11.2
, macrocytic anemia 11.6/36.4, hyponatremia 3.3, elevated alkaline phosphatase 
222, rest of liver function within normal, note that last alkaline phosphatase 
was measured on May 17 prior to discharge was within normal.  It could be due to
current inflammation and purulent tear/wear process, it would be helpful to 
obtain ESR or C-reactive protein.  Pelvic CT with contrast revealed Large 
subcutaneous fluid collection in the soft tissues lateral to the left hip. Urine
is positive for leukocyte esterase, white blood cell with urinary symptoms 
dysuria and urinary incontinence, last urine culture in May was negative and UA 
was done in nursing home was negative as well.
 
Problem list
#Left large subcutaneous fluid collection status post left femur ORIF 
#UTI
#Atrial fibrillation
#COPD
 
Plan
-Admit to general medical floor
-We'll obtain orthopedic consultation, initial recommendation to hold off 
antibiotic for wound drainage and culture
-Gentle IV hydration and keep nothing by mouth
-Pain medication, patient reported history of allergic to codeine hypotension, 
patient received morphine in the ED without significant side effects
-Avoid narcotics opioids, benzos
-ID consultation in a.m.
-Consider obtaining ESR/C-reactive protein
-Monitor alkaline phosphatase
-Consider obtaining INR
-Consider obtaining folic acid and vitamin B12
-Posterior obtaining iron study
-TRC
-Vitals every shift
-Acetaminophen for fever
-Hold paradax for now
-Fall precaution
-Code full
-DVT prophylaxis Alps 
 
 
 
 
As Ranked By This Provider
Problem List:
 1. Intertrochanteric fracture of left hip
 
 2. Infected wound
 
 
Core Measures/Miscellaneous
 
Acute Coronary Syndrome
ACS Diagnosis: No
 
Cerebrovascular Accident
CVA/TIA Diagnosis: No
 
Congestive Heart Failure
CHF Diagnosis: No
 
VTE (View Protocol)
VTE Risk Factors: Age > 40
No [facility] VTE prophylaxis d/t: No contraindications
No VTE Pharm Prophylaxis d/t: Surgical contraindication
VTE Diagnosis: No
VTE Type: NONE
VTE Confirmed by (Test): NONE
 
Sepsis (View Protocol)
Severe Sepsis Present: No
 
Septic Shock
Septic Shock Present: No
 
Miscellaneous Documentation
Attending Case Discussed With:
ALPA SALDANA MD
 
Primary Care Physician:
VIANNEY AYALA MD
 
Patient sees these Specialists
[facility] 
Level of Patient Care: General Medicine
 
 
MARK ROGERS MD 17 0027:
Resident Review Statement
Resident Statement: examined this patient, discussed with intern, agreed with 
intern
Other Findings:
81-year-old lady with a past medical history of chronic atrial fibrillation (
anticoagulated with Pradaxa), left-sided breast cancer status post excision and 
radiation therapy, sick sinus syndrome, status post pacemaker implantation, 
hypothyroidism, COPD not on home oxygen, pulmonary hypertension with RV 
pressures greater than 50 on the last echo in , who was brought in from 
nursing home with complaints of lethargy, poor appetite, fevers with MAXIMUM 
TEMPERATURE of 101.9 and left hip wound drainage for the past 1 week. She also 
complains of dysuria with chills, night sweats and headache.
 
Patient fractured her left hip and had an ORIF on 17 and has been 
receiving PT at the nursing home, however she has remained non-weightbearing on 
that leg.
 
 
Exam and labs as above
 
Assessment 
1. Surgical site infection on the left hip s/p ORIF of left femoral fracture
2. UTI
3. Hypokalemia
4. Chronic afib on pradaxa
5. Hypothyroidism
6. COPD, Pulm HTN
 
Plan
Admit to GM 
Hold Pradaxa for now
Orthopedic consult requested-Plan is for washout of the surgical site in AM and 
to hold antibiotics for now and check INR
Keep NPO for surgery in AM
ID consult
IVF hydration
TRC/Nebs
Blood and Urine cultures
Replete K+ as needed; keep >4
Repeat labs in am
Continue her impt home medications
Heart healthy diet
Pain pathway ordered
Bowel regimen
DVT prophylaxis-Alps
DELMI
 
 
 
 
SHANAALPA BANG 17 0601:
Attending MD Review Statement
 
Attending Statement
Attending MD Statement: examined this patient, discuss w/resident/PA/NP, agreed 
w/resident/PA/NP, reviewed EMR data (avail), reviewed images, amended to note
Attending Assessment/Plan:
 
CC Fever, lethargy 
PMH: A. fib, sick sinus syndrome S/P pacemaker, hypothyroidism, COPD 
 
Patient was sent from nursing home for fever of 100.7, increasing lethargy, and 
drainage and redness from the surgical site. Patient underwent repair of left 
comminuted intertrochanteric subtrochanteric proximal femur fracture with 
medullary nail. Patient was progressively feeling pain, on and off fever, 
lethargy since last 1 week, she also had a urinary frequency and burning, upper 
respiratory congestion, change in voice, chronic cough and was investigated with
hip x-ray, chest x-rays, UA which were unremarkable. Patient denies chest pain, 
nausea vomiting, diarrhea back pain, headache. She endorses suprapubic pain. 
 
Vitals: T max 99.3, pulse 74, RR 20, blood pressure 161/77, saturating well on 
room air.
On exam: A O 3, cooperative, mild discomfort secondary to pain, neck supple, 
JVD normal, no lymphadenopathy, mucosa moist, no focal neurological deficit, no 
dependent edema, no obvious skin rashes or inflammation CVS: S1-S2, RRR. RS: 
Clear to auscultate bilateral bases. Abdomen: Soft, NT, ND, bowel sounds 
present. Left-sided wound surgical scar approximated, drainage from the upper 
side of the scar, indurated, tenderness or erythema towards upper side of the 
scar. 
 
Labs: Hemoglobin 11.6, hematocrit 36.4, WBC 11.2, neutrophils 78% no bands, 
sodium 135, potassium 3.3, chloride 91, bicarbonate 31, BUN 7, creatinine 0.6, 
anion gap 13, glucose 100, calcium 9.2, lactate 1.8, AST 27, ALT 24, alkaline 
phosphatase 222,
UA positive for trace protein, leukocyte esterase, WBC 15 
 
X-ray femur, extremely hip, x-ray pelvis: 
Postoperative changes with an oblique fracture through proximal femoral shaft as
noted. Comparison with any other postoperative exams is recommended. 
 
CXR: Cardiomegaly, no acute intrathoracic process is seen.
 
Pelvis CT with IV contrast: 
Status post left hip replacement. Large subcutaneous fluid collection in the 
soft tissues lateral to the left hip . This could be aspirated percutaneously. 
 
A and P 
 
81-year-old female with past medical history significant for sick sinus S/P 
pacemaker, currently on Pradaxa, hypothyroidism and COPD, S/P repair of left 
comminuted intertrochanteric subtrochanteric proximal femur fracture with 
medullary nail, was sent to ER from nursing home for fever of 100.7, patient has
mild leukocytosis, surgical site appears indurated, erythematous with mild 
discharge. CT scan confirms the finding of fluid collection in soft tissue. 
Orthopedic was consulted from ER, suggested to hold off antibiotics, consult IR 
for drainage, obtain cultures followed by antibiotics. Patient's also found to 
have mild UTI probably cystitis, which would be treated after the drainage. 
 
+ Surgical site wound infection
+ UTI
+ History of A. fib, sick sinus S/P pacemaker, hypothyroidism, COPD
 
- Admit to general medicine
- Obtain blood cultures, urine cultures
- IR consult
- Appreciate orthopedic consult
- Mucinex 600 mg by mouth twice a day
- TRC and nebulizations
- Antibiotics after IR assisted drainage of soft tissue fluid collection and 
culture
- Adequate pain control
- Hold Pradaxa
- Continue rest of home medications

## 2017-06-29 VITALS — DIASTOLIC BLOOD PRESSURE: 70 MMHG | SYSTOLIC BLOOD PRESSURE: 142 MMHG

## 2017-06-29 VITALS — DIASTOLIC BLOOD PRESSURE: 70 MMHG | SYSTOLIC BLOOD PRESSURE: 130 MMHG

## 2017-06-29 VITALS — SYSTOLIC BLOOD PRESSURE: 164 MMHG | DIASTOLIC BLOOD PRESSURE: 80 MMHG

## 2017-06-29 VITALS — DIASTOLIC BLOOD PRESSURE: 80 MMHG | SYSTOLIC BLOOD PRESSURE: 160 MMHG

## 2017-06-29 LAB
ABSOLUTE GRANULOCYTE CT: 8.2 /CUMM (ref 1.4–6.5)
BASOPHILS # BLD: 0 /CUMM (ref 0–0.2)
BASOPHILS NFR BLD: 0.3 % (ref 0–2)
EOSINOPHIL # BLD: 0.2 /CUMM (ref 0–0.7)
EOSINOPHIL NFR BLD: 1.8 % (ref 0–5)
ERYTHROCYTE [DISTWIDTH] IN BLOOD BY AUTOMATED COUNT: 16.4 % (ref 11.5–14.5)
GRANULOCYTES NFR BLD: 77.6 % (ref 42.2–75.2)
HCT VFR BLD CALC: 31.8 % (ref 37–47)
LYMPHOCYTES # BLD: 1.1 /CUMM (ref 1.2–3.4)
MCH RBC QN AUTO: 31.4 PG (ref 27–31)
MCHC RBC AUTO-ENTMCNC: 32.5 G/DL (ref 33–37)
MCV RBC AUTO: 96.7 FL (ref 81–99)
MONOCYTES # BLD: 1 /CUMM (ref 0.1–0.6)
PLATELET # BLD: 315 /CUMM (ref 130–400)
PMV BLD AUTO: 8.3 FL (ref 7.4–10.4)
PROTHROMBIN TIME: 15.4 SEC (ref 9.4–12.5)
RED BLOOD CELL CT: 3.29 /CUMM (ref 4.2–5.4)
WBC # BLD AUTO: 10.5 /CUMM (ref 4.8–10.8)

## 2017-06-29 NOTE — PN- ORTHOPEDIC
Subjective
Subjective:
pain swelling left hip
 
Objective
Vital Signs and I&Os
Vital Signs
 
 
Date Time Temp Pulse Resp B/P B/P Pulse O2 O2 Flow FiO2
 
     Mean Ox Delivery Rate 
 
06/29 0652 97.6 72 20 130/70  95 Nasal 2.0L 
 
       Cannula  
 
06/28 2111 98.0 74 20 158/64  91 Room Air  
 
06/28 1846 98.9 78 20 146/73  96 Room Air  
 
06/28 1748 99.1 82 18 165/80  95 Room Air  
 
06/28 1505      94   
 
06/28 1458 99.3 74 20 161/77  94 Room Air  
 
 
 Intake & Output
 
 
 06/29 1600 06/29 0800 06/29 0000 06/28 1600 06/28 0800 06/28 0000
 
Intake Total      
 
Output Total  1000 75 200  
 
Balance  -1000 -75 -200  
 
       
 
Number  2    
 
Bowel      
 
Movements      
 
Output, Urine  1000 75 200  
 
Patient   235 lb 235 lb  
 
Weight      
 
Weight   Reported by Patient Reported by Patient  
 
Measurement      
 
Method      
 
 
 
Physical Exam:
swelling, erythema proximal to incision
no definite rotatory deformity
 
Results
Last 48 Hours of Labs:
 Laboratory Tests
 
 
 06/29 06/28 06/28
 
 0700 1807 1530
 
Chemistry   
 
  Sodium Pending  
 
  Potassium Pending  
 
  Chloride Pending  
 
  Carbon Dioxide Pending  
 
  Anion Gap Pending  
 
  BUN Pending  
 
  Creatinine Pending  
 
  BUN/Creatinine Ratio Pending  
 
  Lactic Acid  Cancelled 
 
Coagulation   
 
  PT Pending  
 
  INR Pending  
 
Hematology   
 
  CBC w Diff Pending  
 
  WBC Pending  
 
  RBC Pending  
 
  Hgb Pending  
 
  Hct Pending  
 
  MCV Pending  
 
  MCH Pending  
 
  RDW Pending  
 
  Plt Count Pending  
 
  MPV Pending  
 
  PUBS MCHC Pending  
 
Urines   
 
  Urine Color (YEL,AMB,STR)   YEL
 
  Urine Clarity (CLEAR)   HAZY  H
 
  Urine pH (5.0 - 8.0)   6.5
 
  Ur Specific Gravity (1.001 - 1.035)   1.010
 
  Urine Protein (NEG,<30 MG/DL)   TRACE  H
 
  Urine Ketones (NEG)   NEG
 
  Urine Nitrite (NEG)   NEG
 
  Urine Bilirubin (NEG)   NEG
 
  Urine Urobilinogen (0.1  -  1.0 EU/dl)   2.0  H
 
  Ur Leukocyte Esterase (NEG)   MOD  H
 
  Ur Microscopic   SEDIMENT EXAMINED
 
  Urine RBC (0 - 5 /HPF)   10-15  H
 
  Urine WBC (0 - 2 /HPF)   15-25  H
 
  Ur Epithelial Cells (NONE,FEW)   OCCAS
 
  Urine Bacteria (NEG/NONE)   PACKD  H
 
  Urine Mucus (FEW,NONE)   RARE
 
  Urine Hemoglobin (NEG)   MOD  H
 
  Urine Glucose (N MG/DL)   NEG
 
 
 
 
 06/28
 
 1525
 
Chemistry 
 
  Sodium (137 - 145 mmol/L) 135  L
 
  Potassium (3.5 - 5.1 mmol/L) 3.3  L
 
  Chloride (98 - 107 mmol/L) 91  L
 
  Carbon Dioxide (22 - 30 mmol/L) 31  H
 
  Anion Gap (5 - 16) 13
 
  BUN (7 - 17 mg/dL) 7
 
  Creatinine (0.5 - 1.0 mg/dL) 0.6
 
  Estimated GFR (>60 ml/min) > 60
 
  BUN/Creatinine Ratio (7 - 25 %) 11.7
 
  Glucose (65 - 99 mg/dL) 100  H
 
  Lactic Acid (0.7 - 2.1 mmol/L) 1.8
 
  Calcium (8.4 - 10.2 mg/dL) 9.2
 
  Total Bilirubin (0.2 - 1.3 mg/dL) 1.1
 
  AST (14 - 36 U/L) 27
 
  ALT (9 - 52 U/L) 24
 
  Alkaline Phosphatase (<127 U/L) 222  H
 
  Total Protein (6.3 - 8.2 g/dL) 7.1
 
  Albumin (3.5 - 5.0 g/dL) 3.7
 
  Globulin (1.9 - 4.2 gm/dL) 3.4
 
  Albumin/Globulin Ratio (1.1 - 2.2 %) 1.1
 
Hematology 
 
  CBC w Diff NO MAN DIFF REQ
 
  WBC (4.8 - 10.8 /CUMM) 11.2  H
 
  RBC (4.20 - 5.40 /CUMM) 3.70  L
 
  Hgb (12.0 - 16.0 G/DL) 11.6  L
 
  Hct (37 - 47 %) 36.4  L
 
  MCV (81.0 - 99.0 FL) 98.3
 
  MCH (27.0 - 31.0 PG) 31.3  H
 
  RDW (11.5 - 14.5 %) 16.8  H
 
  Plt Count (130 - 400 /CUMM) 381
 
  MPV (7.4 - 10.4 FL) 7.9
 
  Gran % (42.2 - 75.2 %) 78.1  H
 
  Lymphocytes % (20.5 - 51.1 %) 11.5  L
 
  Monocytes % (1.7 - 9.3 %) 8.8
 
  Eosinophils % (0 - 5 %) 1.3
 
  Basophils % (0.0 - 2.0 %) 0.3
 
  Absolute Granulocytes (1.4 - 6.5 /CUMM) 8.7  H
 
  Absolute Lymphocytes (1.2 - 3.4 /CUMM) 1.3
 
  Absolute Monocytes (0.10 - 0.60 /CUMM) 1.0  H
 
  Absolute Eosinophils (0.0 - 0.7 /CUMM) 0.1
 
  Absolute Basophils (0.0 - 0.2 /CUMM) 0
 
  PUBS MCHC (33.0 - 37.0 G/DL) 31.8  L
 
 
 
Recent Imaging Studies:
fairly large fluid collection around left hip on CT
previous placed long IM estela for fixation
 
Assessment/Plan
Assessment/Plan
large fluid collection around recent surgical wound-likely originally hematoma 
but likely infected
aspiration in IR today for specimen-culture and sensitivity. Will likely need I 
and D depending on cultures.
Also patient was on pradaxa which is being held for the possibility of surgery
would make NPO after MN for anticipated procedure Friday or Sat
Will discuss with patients ortho attending

## 2017-06-29 NOTE — NUR
NURSING NOTE: LATE ENTRY. PT ARRIVED TO FLOOR AT 2025 ON 6/28/17 FROM ED BY
STRETCHER. PT IS A&OX3 BUT CAN BE FORGETFUL @ TIMES. ON ROOM AIR NO DISTRESS
NOTED BUT C/O DYSPNEA WHEN LAYING FLAT AND ON EXERTION. PT L HIP DSG C/D/I AND
WAS CHANGED BY SURGICAL PA IN ED. SCANT DRAINAGE NOTED AND SITE MARKED. PT
VOIDING ON BEDPAN AND CAN BE INCONTINENT @ TIMES. PT BASELINE IS INDEPENDENT
W/ RW BUT AT THIS TIME PT IS ONLY TOE TOUCH TO L FT W/ RW. LAST BM ON 6/28/17
AS REPORTED BY PT. PTS SKIN INTACT BUT THERE IS REDNESS TO THE L HIP SITE AND
BRUISING FROM PTS PREVIOUS FALL. PT DENIES ANY PAIN AT THIS TIME. FALL
PRECAUTIONS IN PLACE. PT EDUCATED ABOUT FALL PRECAUTIONS AND HOW TO USE THE
CALL LIGHT SYSTEM. INFO PACKET GIVEN. AWAIT FOR MD TO COME UP TO ASSESS AND
TALK TO PT. NO FURTHER ORDERS AT THIS TIME. VSS. WILL CONTINUE TO MONITOR.

## 2017-06-29 NOTE — PN- STUDENT
Subjective
Subjective:
Patient was seen this am in bed, reports feeling "extremely nervous" about the 
treatment of her hip. She states her left hip is painful but IV tylenol has been
helping sometimes. She reports being a little short of breath and nauseous but 
denies chest pain, vomitting, headaches and dizziness. She is using the bed pan 
to void with nursing help. 
 
Objective
Objective:
 
 Vital Signs
 
 
 Result Date Time
 
Pulse Ox 95 06/29 0652
 
B/P 130/70 06/29 0652
 
O2 Delivery Nasal Cannula 06/29 0652
 
O2 Flow Rate 2.0L 06/29 0652
 
Temp 97.6 06/29 0652
 
Pulse 72 06/29 0652
 
Resp 20 06/29 0652
 
 
 Intake & Output
 
 
 06/29 0000 06/28 1600 06/28 0800
 
Intake Total   
 
Output Total 75 200 
 
Balance -75 -200 
 
    
 
Output, Urine 75 200 
 
Patient 235 lb 235 lb 
 
Weight   
 
Weight Reported by Patient Reported by Patient 
 
Measurement   
 
Method   
 
 
General: Awake, oriented but groggy
Lungs: CTAB, no wheeze/rales/rhonchi
Heart: S1 S2, RRR, no M,R,G
Abdomen: soft, normoactive bowel sounds, non-tender
Extremities: ALPS in place, gross motor/sensory function in tact, warm, no calf 
tenderness bilaterally
 Left Hip: dressing stained with purulent yellow fluid, erythema and 
inflammation noted along incision site, hot, exquisitely tender to light touch 
IV line in place and running
 
 
Assessment/Plan
Assessment:
This is a 82 y/o female s/p left hip ORIF for femoral neck fracture on 5/19/2017
with left hip wound infection with subcutaneous fluid collection. PMH 
significant for afib, CHF, htn. 
Plan:
Diet: NPO 
Pain: continue current pain management regimen with IV tylenol PRN
DVT and GI ppx 
Consider percutaneous drainage and culture of abscess in IR 
Continue IV fluids
Daily am labs

## 2017-06-29 NOTE — ADMISSION CERTIFICATION
Admission Certification
 
Certification Statement
- As attending physician, I certify that at the time of
- admission, based on clinical presentation, severity of
- symptoms, need for further diagnostic testing and
- therapeutic interventions, and risk of adverse outcomes
- without in-hospital treatment, in my clinical assessment,
- this patient requires an acute hospital stay for a minimum
- of two nights or longer. I have also considered psychsocial
- factors such as support system, advanced age, financial
- issues, cognitive issues, and failed out-patient treatments,
- past re-admission history, safety of patient, and lack of
- compliance as applicable.
Specific rationale supporting this admission is:
Surgical site wound infection

## 2017-06-29 NOTE — CONS- INFECT DISEASE
General Information and HPI
 
Consulting Request
Date of Consult: 17
Requested By:
ALLAN ROTHMAN MD
 
Reason for Consult:
Surgical site infection left hip
Source of Information: patient, old records
Exam Limitations: clinical condition
History of Present Illness:
This is an 81-year-old woman with a history of atrial fibrillation, maintained 
on Pradaxa, sick sinus syndrome, status post pacemaker, COPD, maintained on 
oxygen, pulmonary hypertension, hypothyroidism, status post ORIF of a comminuted
left proximal femoral intertrochanteric subtrochanteric fracture 6 weeks prior 
to admission after a fall, with Cefazolin prophylaxis, discharged to a short-
term rehabilitation 3 days postop, with serosanguineous drainage reported for 
the initial 2 weeks, admitted on  because of a fever to 100.7 and a one-
week history of purulent drainage from her left hip incision with erythema and 
intermittent pain as well as dysuria, urinary frequency and lethargy.  On 
admission she was afebrile.  Laboratory data revealed a white blood cell count 
of 10,000, BUN/creatinine 7 and 0.6, alk phosphatase 222.  Urinalysis 10-15 RBC/
15-25 WBCs.  Chest x-ray was negative for any acute process.  X-ray of the 
pelvis and left femur revealed postop changes with an oblique fracture through 
the proximal femoral shaft.  CT of the pelvis revealed a large subcutaneous 
fluid collection in the soft tissues lateral to the left hip, measuring 11 x 27 
cm, with air in the collection.  She was followed off antibiotics and has 
remained afebrile overnight.  At present she reports listlessness but does not 
report any pain in the hip.
 
Allergies/Medications
Allergies:
Coded Allergies:
coconut oil (ITCHING ALL OVER  16)
codeine ("MY BP GOES WAY DOWN" 16)
 
Home Med List:
Acetaminophen (Acephen) 650 MG SUPP.RECT   1 SUPP NC Q6H PRN PAIN/TEMP>101  (
Reported)
Acetaminophen (Tylenol) 325 MG TABLET   2 TAB PO Q6H PRN PAIN/TEMP>101  (
Reported)
Bisacodyl (Dulcolax) 10 MG SUPP.RECT   1 SUP RC DAILY PRN CONSTIPATION  (
Reported)
Dabigatran Etexilate Mesylate (Pradaxa 150 MG) 150 MG CAPSULE   1 CAP PO BID 
BLOOD THINNER  (Reported)
Docusate Sodium 100 MG CAPSULE   1 CAP PO BID constipation
Furosemide 40 MG TABLET   1 TAB PO DAILY CHF  (Reported)
Levothyroxine Sodium (Synthroid) 175 MCG TABLET   1 TAB PO DAILY AC THYROID  (
Reported)
Losartan Potassium (Cozaar) 25 MG TABLET   1 TAB PO DAILY BP  (Reported)
Magnesium Hydroxide (Milk Of Magnesia) 400 MG/5 ML ORAL.SUSP   30 ML PO Q3D PRN 
CONSTIPATION  (Reported)
Metoprolol Succinate 50 MG TAB.ER.24H   1 TAB PO DAILY BP  (Reported)
Multivitamin (Multi-Day Vitamins) 1 EACH TABLET   1 TAB PO DAILY SUPPLEMENT  (
Reported)
Na Phos,M-B/Na Phos,Di-Ba (Fleet Enema) 19 GRAM-7 GRAM/118 ML ENEMA   1 E RC 
DAILY PRN CONSTIPATION  (Reported)
Naloxone HCl (Narcan) 4 MG/ACTUATION SPRAY   4 MG LEÓN AD PRN OPIOID INDUCED 
RESP. DEPRESSIO  (Reported)
Oxycodone HCl/Acetaminophen (Percocet 5-325 MG Tablet) 5 MG-325 MG TABLET   2 
TAB PO Q4H PRN SEVERE PAIN  (Reported)
Oxycodone HCl/Acetaminophen (Percocet 5-325 MG Tablet) 5 MG-325 MG TABLET   1 
TAB PO Q4P PRN PAIN SCALE 4-6 (MODERATE)
     not to exceed more than 3000 mg of acetaminophen daily 
Paroxetine HCl 40 MG TABLET   1 TAB PO DAILY DEPRESSION  (Reported)
Polyethylene Glycol 3350 (Miralax) 17 GRAM POWD.PACK   1 PAC PO DAILY 
constipation
     dissolve in water
Pravastatin Sodium 10 MG TABLET   1 TAB PO DAILY CHOLESTEROL  (Reported)
Sennosides (Senna) 8.6 MG TABLET   2 TAB PO DAILY constipation 
Umeclidinium Brm/Vilanterol Tr (Anoro Ellipta 62.5-25 Mcg INH) 62.5 MCG-25 MCG/
ACTUATION BLST.W.DEV   1 PUFF INH DAILY RESPIRATORY  (Reported)
Vit A,C & E/Lutein/Minerals (Ocuvite With Lutein Tablet) 1 EACH TABLET   1 TAB 
PO DAILY SUPPLEMENT  (Reported)
 
 
Past History
 
Travel History
Traveled to [place] past 21 day No
 
Medical History
Blood Transfusion Hx: Yes
Neurological: NONE
EENT: NONE
Cardiovascular: AFIB, CHF, hypertension, hyperlipidemia, SICK SINUS
Respiratory: COPD, pulmonary hypertension
Gastrointestinal: NONE
Hepatic: NONE
Renal: NONE
Musculoskeletal: osteoarthritis
Psychiatric: depression
Endocrine: HYPOTHYROID
Blood Disorders: NONE
Cancer(s): breast cancer
GYN/Reproductive: NONE
History of MRSA: No
History of VRE: No
History of CDIFF: No
Isolation History: Standard
 
Surgical History
Surgical History: hysterectomy, lumpectomy (left breast ), STAUS POST 
THYROIDECTOMY s/p left ankle orif (Right CEA/ Appendectomy and ch), status post 
right carotid endarterectomy, status post endovascular repair of an infrarenal 
AAA , status post pacemaker
 
Family History
Relations & Conditions If Any:
FATHER, .
SISTER
MOTHER
MOTHER
Relation not specified for:
  FH: CHF (congestive heart failure)
  FH: hypertension
  FH: kidney disease
 
 
Psychosocial History
Services at Home: None
Primary Language: English
Smoking Status: Former Smoker
 
Functional Ability
Ambulation: walker
 
Review of Systems
 
Review of Systems
Cardiovascular:
Denies: chest pain. 
Respiratory:
Denies: cough, short of breath. 
GI:
Denies: abdominal pain, nausea, vomiting. 
Genitourinary:
Reports: dysuria. 
Musculoskeletal:
Reports: back pain. 
All Other Systems: Reviewed and Negative
 
Exam & Diagnostic Data
Last 24 Hrs of Vital Signs/I&O
 Vital Signs
 
 
Date Time Temp Pulse Resp B/P B/P Pulse O2 O2 Flow FiO2
 
     Mean Ox Delivery Rate 
 
 1101       Nasal 2.0L 
 
       Cannula  
 
 0652 97.6 72 20 130/70  95 Nasal 2.0L 
 
       Cannula  
 
 2111 98.0 74 20 158/64  91 Room Air  
 
 1846 98.9 78 20 146/73  96 Room Air  
 
 1748 99.1 82 18 165/80  95 Room Air  
 
 1505      94   
 
 1458 99.3 74 20 161/77  94 Room Air  
 
 
 Intake & Output
 
 
  1600  0800  0000
 
Intake Total   
 
Output Total  1000 75
 
Balance  -1000 -75
 
    
 
Number  2 
 
Bowel   
 
Movements   
 
Output, Urine  1000 75
 
Patient   235 lb
 
Weight   
 
Weight   Reported by Patient
 
Measurement   
 
Method   
 
 
 
 
Physical Exam
Other Physical Findings:
She appears fatigued but is awake and alert in no acute distress.  She is 
afebrile.  Skin reveals no rash.  HEENT exam is negative.  Neck is supple with 
no adenopathy.  Lungs are clear.  Heart regular rhythm with no murmur.  Abdomen 
is soft, nontender with positive bowel sounds.  Back left CVA tenderness.  
Extremities left hip incision with purulent drainage, with minimal erythema, 
tender to palpation; cool feet with decreased perfusion.  Neuro is without 
focality.
 
Last 24 Hours of Lab Results:
 Laboratory Tests
 
 
 
 
 0700 1807
 
Chemistry  
 
  Sodium (137 - 145 mmol/L) 134  L 
 
  Potassium (3.5 - 5.1 mmol/L) 3.9 
 
  Chloride (98 - 107 mmol/L) 97  L 
 
  Carbon Dioxide (22 - 30 mmol/L) 27 
 
  Anion Gap (5 - 16) 10 
 
  BUN (7 - 17 mg/dL) 7 
 
  Creatinine (0.5 - 1.0 mg/dL) 0.5 
 
  Estimated GFR (>60 ml/min) > 60 
 
  BUN/Creatinine Ratio (7 - 25 %) 14.0 
 
  Lactic Acid  Cancelled
 
Coagulation  
 
  PT (9.4 - 12.5 SEC) 15.4  H 
 
  INR (0.90 - 1.19) 1.47  H 
 
Hematology  
 
  CBC w Diff NO MAN DIFF REQ 
 
  WBC (4.8 - 10.8 /CUMM) 10.5 
 
  RBC (4.20 - 5.40 /CUMM) 3.29  L 
 
  Hgb (12.0 - 16.0 G/DL) 10.3  L 
 
  Hct (37 - 47 %) 31.8  L 
 
  MCV (81.0 - 99.0 FL) 96.7 
 
  MCH (27.0 - 31.0 PG) 31.4  H 
 
  RDW (11.5 - 14.5 %) 16.4  H 
 
  Plt Count (130 - 400 /CUMM) 315 
 
  MPV (7.4 - 10.4 FL) 8.3 
 
  Gran % (42.2 - 75.2 %) 77.6  H 
 
  Lymphocytes % (20.5 - 51.1 %) 10.7  L 
 
  Monocytes % (1.7 - 9.3 %) 9.6  H 
 
  Eosinophils % (0 - 5 %) 1.8 
 
  Basophils % (0.0 - 2.0 %) 0.3 
 
  Absolute Granulocytes (1.4 - 6.5 /CUMM) 8.2  H 
 
  Absolute Lymphocytes (1.2 - 3.4 /CUMM) 1.1  L 
 
  Absolute Monocytes (0.10 - 0.60 /CUMM) 1.0  H 
 
  Absolute Eosinophils (0.0 - 0.7 /CUMM) 0.2 
 
  Absolute Basophils (0.0 - 0.2 /CUMM) 0 
 
  PUBS MCHC (33.0 - 37.0 G/DL) 32.5  L 
 
 
 
 
 
 
 1530 1525
 
Chemistry  
 
  Sodium (137 - 145 mmol/L)  135  L
 
  Potassium (3.5 - 5.1 mmol/L)  3.3  L
 
  Chloride (98 - 107 mmol/L)  91  L
 
  Carbon Dioxide (22 - 30 mmol/L)  31  H
 
  Anion Gap (5 - 16)  13
 
  BUN (7 - 17 mg/dL)  7
 
  Creatinine (0.5 - 1.0 mg/dL)  0.6
 
  Estimated GFR (>60 ml/min)  > 60
 
  BUN/Creatinine Ratio (7 - 25 %)  11.7
 
  Glucose (65 - 99 mg/dL)  100  H
 
  Lactic Acid (0.7 - 2.1 mmol/L)  1.8
 
  Calcium (8.4 - 10.2 mg/dL)  9.2
 
  Total Bilirubin (0.2 - 1.3 mg/dL)  1.1
 
  AST (14 - 36 U/L)  27
 
  ALT (9 - 52 U/L)  24
 
  Alkaline Phosphatase (<127 U/L)  222  H
 
  Total Protein (6.3 - 8.2 g/dL)  7.1
 
  Albumin (3.5 - 5.0 g/dL)  3.7
 
  Globulin (1.9 - 4.2 gm/dL)  3.4
 
  Albumin/Globulin Ratio (1.1 - 2.2 %)  1.1
 
Hematology  
 
  CBC w Diff  NO MAN DIFF REQ
 
  WBC (4.8 - 10.8 /CUMM)  11.2  H
 
  RBC (4.20 - 5.40 /CUMM)  3.70  L
 
  Hgb (12.0 - 16.0 G/DL)  11.6  L
 
  Hct (37 - 47 %)  36.4  L
 
  MCV (81.0 - 99.0 FL)  98.3
 
  MCH (27.0 - 31.0 PG)  31.3  H
 
  RDW (11.5 - 14.5 %)  16.8  H
 
  Plt Count (130 - 400 /CUMM)  381
 
  MPV (7.4 - 10.4 FL)  7.9
 
  Gran % (42.2 - 75.2 %)  78.1  H
 
  Lymphocytes % (20.5 - 51.1 %)  11.5  L
 
  Monocytes % (1.7 - 9.3 %)  8.8
 
  Eosinophils % (0 - 5 %)  1.3
 
  Basophils % (0.0 - 2.0 %)  0.3
 
  Absolute Granulocytes (1.4 - 6.5 /CUMM)  8.7  H
 
  Absolute Lymphocytes (1.2 - 3.4 /CUMM)  1.3
 
  Absolute Monocytes (0.10 - 0.60 /CUMM)  1.0  H
 
  Absolute Eosinophils (0.0 - 0.7 /CUMM)  0.1
 
  Absolute Basophils (0.0 - 0.2 /CUMM)  0
 
  PUBS MCHC (33.0 - 37.0 G/DL)  31.8  L
 
Urines  
 
  Urine Color (YEL,AMB,STR) YEL 
 
  Urine Clarity (CLEAR) HAZY  H 
 
  Urine pH (5.0 - 8.0) 6.5 
 
  Ur Specific Gravity (1.001 - 1.035) 1.010 
 
  Urine Protein (NEG,<30 MG/DL) TRACE  H 
 
  Urine Ketones (NEG) NEG 
 
  Urine Nitrite (NEG) NEG 
 
  Urine Bilirubin (NEG) NEG 
 
  Urine Urobilinogen (0.1  -  1.0 EU/dl) 2.0  H 
 
  Ur Leukocyte Esterase (NEG) MOD  H 
 
  Ur Microscopic SEDIMENT EXAMINED 
 
  Urine RBC (0 - 5 /HPF) 10-15  H 
 
  Urine WBC (0 - 2 /HPF) 15-25  H 
 
  Ur Epithelial Cells (NONE,FEW) OCCAS 
 
  Urine Bacteria (NEG/NONE) PACKD  H 
 
  Urine Mucus (FEW,NONE) RARE 
 
  Urine Hemoglobin (NEG) MOD  H 
 
  Urine Glucose (N MG/DL) NEG 
 
 
 
Last 24 Hours of [place] Results:
Blood cultures 2  negative
 
Urine culture  greater than 100,000 colonies of gram-negative rods
 
 
Diagnostic Data
Recent Imaging Findings:
Chest x-ray negative for any acute process.  
 
X-ray of the pelvis and left femur revealed postop changes with an oblique 
fracture through the proximal femoral shaft.  
 
CT of the pelvis revealed a large subcutaneous fluid collection in the soft 
tissues lateral to the left hip, measuring 11 x 27 cm, with air in the 
collection.  
 
 
Assessment/Plan
 
Assessment/Plan
Impression:
This is an 81-year-old woman with a history of atrial fibrillation, COPD, status
post recent ORIF of a comminuted left femoral intertrochanteric -subtrochanteric
fracture 6 weeks prior to admission after a fall admitted on  with a one-
week history of pain and drainage from the left hip with dysuria, urinary 
frequency and fever, found to be afebrile with a normal white blood cell count 
and with a CT of the pelvis revealing a large subcutaneous fluid collection in 
the soft tissues lateral to the left hip.  Her presentation is concerning for a 
surgical site infection, with the collection suggestive of an abscess or 
infected hematoma, and she is scheduled for aspiration/drainage of this 
collection under IR later today.  She will most likely require I&D in the OR and
she has already been evaluated by Orthopedics, who is planning this in the next 
several days as she is on Pradaxa.  Am most concerned about Staph aureus, 
including MRSA, but, with gram negative rods isolated from her urine culture in 
the setting of dysuria, suggesting a urinary tract infection, she will need 
coverage for gram negatives as well.  Note a urine culture sent 5 days prior to 
admission was negative.  With the hardware in place it may be difficult to 
eradicate this infection without its removal, though this may not be feasible, 
and this will need to be discussed further with Orthopedics.
 
Suggestion:
1.  Await aspiration of the subcutaneous fluid collection by IR later today
2.  Follow-up culture of this aspiration as well as her recent blood and urine 
cultures
3.  Begin Vancomycin 1.5 g IV every 24 hours and Ceftazidime 2 g IV every 8 
hours pending above
 
 
 
Consult Acknowledgment
- Thank you for your consult request.

## 2017-06-30 VITALS — DIASTOLIC BLOOD PRESSURE: 58 MMHG | SYSTOLIC BLOOD PRESSURE: 114 MMHG

## 2017-06-30 VITALS — DIASTOLIC BLOOD PRESSURE: 80 MMHG | SYSTOLIC BLOOD PRESSURE: 142 MMHG

## 2017-06-30 VITALS — SYSTOLIC BLOOD PRESSURE: 140 MMHG | DIASTOLIC BLOOD PRESSURE: 80 MMHG

## 2017-06-30 LAB
ABSOLUTE GRANULOCYTE CT: 9.2 /CUMM (ref 1.4–6.5)
BASOPHILS # BLD: 0 /CUMM (ref 0–0.2)
BASOPHILS NFR BLD: 0.3 % (ref 0–2)
EOSINOPHIL # BLD: 0.1 /CUMM (ref 0–0.7)
EOSINOPHIL NFR BLD: 0.8 % (ref 0–5)
ERYTHROCYTE [DISTWIDTH] IN BLOOD BY AUTOMATED COUNT: 17.2 % (ref 11.5–14.5)
GRANULOCYTES NFR BLD: 83.8 % (ref 42.2–75.2)
HCT VFR BLD CALC: 32.9 % (ref 37–47)
LYMPHOCYTES # BLD: 0.9 /CUMM (ref 1.2–3.4)
MCH RBC QN AUTO: 31.3 PG (ref 27–31)
MCHC RBC AUTO-ENTMCNC: 32.1 G/DL (ref 33–37)
MCV RBC AUTO: 97.7 FL (ref 81–99)
MONOCYTES # BLD: 0.8 /CUMM (ref 0.1–0.6)
PLATELET # BLD: 331 /CUMM (ref 130–400)
PMV BLD AUTO: 8.1 FL (ref 7.4–10.4)
RED BLOOD CELL CT: 3.37 /CUMM (ref 4.2–5.4)
WBC # BLD AUTO: 11 /CUMM (ref 4.8–10.8)

## 2017-06-30 NOTE — OPERATIVE REPORT
Operative/Inv Procedure Report
Surgery Date: 06/30/17
Name of Procedure:
Incision and drainage of left hip infection s/p IM nail
Pre-Operative Diagnosis:
Left hip infection s/p IM nail for subtroch fracture
Post-Operative Diagnosis:
Left hip infection s/p IM nail for subtroch fracture
Estimated Blood Loss: 50ml to 100ml
Surgeon/Assistant:
Nina Joyner MD
 
Anesthesia: laryngeal mask airway
Drains:
2 flat TONIA drains
Specimens:
Deep tissue sample
Microbiology:
Deep tissue sample
Complications:
None
Condition:
Stable
 
Operative/Procedure Note
Note:
INDICATION FOR PROCEDURE:
80yo female underwent left hip open reduction and IM nail stabilization of 
intertrochanteric/subtrochanteric hip fracture on 5/19/2017 by Dr. Louis.  She 
was subsequently discharge to [facility]; she reports ongoing pain and 
mild wound drainage.  She presented to [facility] earlier this week with 
redness, swelling, and purulent drainage from the left hip.  She had a 
temperature of 101.9.  A CT scan of the hip showed a large lateral fluid 
collection; it was percutaneously drainage by IR with removal of 180cc of cloudy
fluid; culture grew alpha strep.  She is currently on Ampicillin per Infectious 
Disease recommendation.  After discussing the risks and benefits of surgery, the
patient was taken to the operating room for formal debridement of the left hip 
wound.
 
 
OPERATIVE REPORT:
Ms. Jacobo was brought to the operating room on 6/30/2017.  She was met in the 
pre-operative area, where her left hip was marked and her medical history was 
reviewed.  She was then brought into the OR.  A time-out was performed in which 
the patient, the operative extremity, and the planned procedure were reviewed.  
A SCD was placed on the right lower leg, but VTE chemoprophylaxis was held.  The
patient was induced under general anesthesia.  She was repositioned lateral with
the left side up using a beanbag.  All bony prominences were padded and an 
axillary roll placed.  Mey-operative ancef was given prior to incision; the 
patient's last dose of ampicillin was given at 2pm. The left hip and lower leg 
were prepped with betadine scrub and paint and draped in the usual sterile 
fashion.  
 
The 30cm healed lateral incision was re-incised and taken through the skin and 
subcutaneous tissues. There was no discrete sinus tract, but a small wound from 
the prior IR aspiration performed yesterday. The tissue was firm and indurated. 
The deep tissue opened easily with blunt dissection and a large volume of 
purulent fluid was expressed from the proximal portion of the wound.  The fascia
was intact over the proximal hardware and greater trochanter.  A large curette 
was used to debride the tissue superficial to the fascia and up to the skin.  In
the distal aspect of the wound, another purulent fluid collection was 
encountered. There was a small opening noted in the fascia of the iliotibial 
band, just distal to the lag screw. The fascial opening was extended proximally 
about 7cm.  A clear, serous-appearing fluid collection was encountered at the 
level of the lag screw.  The hardware of the lag screw and the nail was 
visualized without surrounding lateral bone. No additional areas of purulence 
were noted.    
 
Six liters of saline with bacitracin were used to irrigate the wound with pulse 
lavage.  Following the first six liters, the wound was again debrided using the 
large curette.  Deep tissue samples were sent for culture.  An additional three 
liters of irrigation were used, followed by another mechanical debridement and 
repeat irrigation.  The fascia was then closed with #1 PDS suture.  Two flat TONIA 
drains were placed in the wound superficial to the fascia, one proximal and one 
distal.  The wound was then closed in layers using 0 PDS and #2-0 PDS, with 
intermittent irrigation of the layers.  Ths skin was closed with #3-0 
interrupted Nylon suture. 
 
The wound was dressed with xeroform, gauze, and ABD pads, which were secured 
with foam tape.  The drains were sutured in place using #3-0 Nylon.  Both drains
held suction with a small amount of serosanginous drainage. At the conclusion of
the procedure, the patient was repositioned supine on a stretcher.  A Interiano 
cather was placed for patient comfort.  She was then extubated and taken to the 
recovery room in stable condition.

## 2017-06-30 NOTE — DISCHARGE SUMMARY
**See Addendum**
Visit Information
 
Visit Dates
Admission Date:
06/28/17
 
Discharge Date:
 
07/06/2017
 
Hospital Course
 
Course
Attending Physician:
ALLAN ROTHMAN MD
 
Primary Care Physician:
VIANNEY AYALA MD
 
Consulting Request:
   Consulting Specialty: Orthopedics
Hospital Course:
81-year-old female with past medical history significant for sick sinus S/P 
pacemaker, currently on Pradaxa, hypothyroidism and COPD, S/P repair of left 
comminuted intertrochanteric subtrochanteric proximal femur fracture with 
medullary nail, was sent to ER from nursing home for fever of 100.7, patient has
mild leukocytosis, surgical site appears indurated, erythematous with mild 
discharge. 
 
CT scan confirms the finding of fluid collection in soft tissue. Patient was 
admitted to  for Abx therapy and wash out of the infected wound. NOAC was held
and patient had US guided drainage of the pus and sample was sent for Cx and 
Gram satin. After drainage, patient was started on Vancomycin and Ceftazidime 
for broad spectrum coverage. 
 
Pt got surgical wound wash out on 06/30/2017. On 06/30/2017, patient urine Cx 
grew Proteus Mirabilis and Joint drainage was also positive for Gram + cocci in 
chains that was susceptible Staph Aureus. Patient's Abx regimen was switched to 
IV Vancomycine-->Ampicillin--> now Cefazolin for total of 6 weeks. Pradaxa 
resumed. Con't until advised otherwise by orthopedic surgeon.
 
Patient would be discharge to [facility] for rehab and completion of her IV course.  
Allergies:
Coded Allergies:
coconut oil (ITCHING ALL OVER  09/19/16)
codeine ("MY BP GOES WAY DOWN" 09/19/16)
 
Significant Procedures:
OR Washout of inefcted hip
Pertinent Lab Results:
 Laboratory Tests
 
 
 07/05 07/05
 
 1032 0530
 
Chemistry  
 
  Troponin I Pending 
 
Hematology  
 
  CBC w Diff  NO MAN DIFF REQ
 
  WBC (4.8 - 10.8 /CUMM)  9.1
 
  RBC (4.20 - 5.40 /CUMM)  3.27  L
 
  Hgb (12.0 - 16.0 G/DL)  10.1  L
 
  Hct (37 - 47 %)  31.7  L
 
  MCV (81.0 - 99.0 FL)  97.1
 
  MCH (27.0 - 31.0 PG)  30.9
 
  RDW (11.5 - 14.5 %)  16.8  H
 
  Plt Count (130 - 400 /CUMM)  378
 
  MPV (7.4 - 10.4 FL)  7.5
 
  Gran % (42.2 - 75.2 %)  74.8
 
  Lymphocytes % (20.5 - 51.1 %)  14.3  L
 
  Monocytes % (1.7 - 9.3 %)  6.4
 
  Eosinophils % (0 - 5 %)  3.9
 
  Basophils % (0.0 - 2.0 %)  0.6
 
  Absolute Granulocytes (1.4 - 6.5 /CUMM)  6.8  H
 
  Absolute Lymphocytes (1.2 - 3.4 /CUMM)  1.3
 
  Absolute Monocytes (0.10 - 0.60 /CUMM)  0.6
 
  Absolute Eosinophils (0.0 - 0.7 /CUMM)  0.4
 
  Absolute Basophils (0.0 - 0.2 /CUMM)  0.1
 
  PUBS MCHC (33.0 - 37.0 G/DL)  31.9  L
 
 
 
 
 07/04
 
 0540
 
Hematology 
 
  CBC w Diff NO MAN DIFF REQ
 
  WBC (4.8 - 10.8 /CUMM) 9.1
 
  RBC (4.20 - 5.40 /CUMM) 3.27  L
 
  Hgb (12.0 - 16.0 G/DL) 10.2  L
 
  Hct (37 - 47 %) 31.9  L
 
  MCV (81.0 - 99.0 FL) 97.6
 
  MCH (27.0 - 31.0 PG) 31.3  H
 
  RDW (11.5 - 14.5 %) 17.1  H
 
  Plt Count (130 - 400 /CUMM) 381
 
  MPV (7.4 - 10.4 FL) 7.8
 
  Gran % (42.2 - 75.2 %) 66.7
 
  Lymphocytes % (20.5 - 51.1 %) 18.9  L
 
  Monocytes % (1.7 - 9.3 %) 8.9
 
  Eosinophils % (0 - 5 %) 5.1  H
 
  Basophils % (0.0 - 2.0 %) 0.4
 
  Absolute Granulocytes (1.4 - 6.5 /CUMM) 6.1
 
  Absolute Lymphocytes (1.2 - 3.4 /CUMM) 1.7
 
  Absolute Monocytes (0.10 - 0.60 /CUMM) 0.8  H
 
  Absolute Eosinophils (0.0 - 0.7 /CUMM) 0.5
 
  Absolute Basophils (0.0 - 0.2 /CUMM) 0
 
  PUBS MCHC (33.0 - 37.0 G/DL) 32.0  L
 
  ESR Westergren (0 - 20 MM) 35  H
 
 
 
 
Disposition Summary
 
Disposition
Principal Diagnosis:
Surgical wound infection 
Additional Diagnosis:
ORIF of the left femur 
Discharge Disposition: SNF
 
Discharge Instructions
 
General Discharge Information
Code Status: Full Code
Patient's Diet:
Heart Healthy
Patient's Activity:
As tolerated 
Follow-Up Instructions/Appts:
Follow up with your orthopedic surgeon as instructed by orthopedic team
Follow up with Dr. Gill after 6 weeks of IV therapy. 
Follow up with your PCP with 7 days after discharge. 
 
Medications at Discharge
Discharge Medications:
Continue taking these medications:
Paroxetine HCl (Paroxetine HCl) 40 MG TABLET
    1 Tablet ORAL DAILY
    Comments:
       Last Taken:5/22/17
             Time:0900
 
Levothyroxine Sodium (Synthroid) 175 MCG TABLET
    1 Tablet ORAL DAILY BEFORE BREAKFAST
    Comments:
       Last Taken:5/22/17
             Time:0800
 
Metoprolol Succinate (Metoprolol Succinate) 50 MG TAB.ER.24H
    1 Tablet ORAL DAILY
    Comments:
       Last Taken:5/22/17
             Time:0900
 
Dabigatran Etexilate Mesylate (Pradaxa 150 MG) 150 MG CAPSULE
    1 Capsule ORAL TWICE DAILY
    Comments:
       Last Taken:5/22/17
             Time:0900
 
Losartan Potassium (Cozaar) 25 MG TABLET
    1 Tablet ORAL DAILY
    Comments:
       Last Taken:5/22/17
             Time:0900
 
Pravastatin Sodium (Pravastatin Sodium) 10 MG TABLET
    1 Tablet ORAL DAILY
    Qty = 90
    Comments:
       Last Taken:5/21/17
             Time:1630
 
Multivitamin (Multi-Day Vitamins) 1 EACH TABLET
    1 Tablet ORAL DAILY
    Comments:
       Last Taken:NOT GIVEN IN HOSPITAL
             Time:
 
Vit A,C & E/Lutein/Minerals (Ocuvite With Lutein Tablet) 1 EACH TABLET
    1 Tablet ORAL DAILY
    Comments:
       Last Taken:NOT GIVEN IN HOSPITAL
             Time:
 
Furosemide (Furosemide) 40 MG TABLET
    1 Tablet ORAL DAILY
    Comments:
       Last Taken:5/22/17
             Time:0900
 
Polyethylene Glycol 3350 (Miralax) 17 GRAM POWD.PACK
    1 Packet ORAL DAILY
    Qty = 30
    Instructions:
       dissolve in water
    Comments:
       Last Taken:5/21/17
             Time:1120
 
Docusate Sodium (Docusate Sodium) 100 MG CAPSULE
    1 Capsule ORAL TWICE DAILY
    Qty = 30
    Comments:
       Last Taken:5/21/17
             Time:1120
 
Sennosides (Senna) 8.6 MG TABLET
    2 Tablet ORAL DAILY
    Qty = 60
    Comments:
       Last Taken:5/18/17
             Time:2100
 
Oxycodone HCl/Acetaminophen (Percocet 5-325 MG Tablet) 5 MG-325 MG TABLET
    1 Tablet ORAL EVERY 4 HOURS AS NEEDED as needed for PAIN SCALE 4-6 (MODERATE
)
    Qty = 10
    Instructions:
       not to exceed more than 3000 mg of acetaminophen daily 
    Comments:
       Last Taken:5/22/17
             Time:1330
 
Acetaminophen (Acephen) 650 MG SUPP.RECT
    1 SUPPOSITORY RECTALLY Q6H as needed for PAIN/TEMP>101
 
Acetaminophen (Tylenol) 325 MG TABLET
    2 Tablet ORAL Q6H as needed for PAIN/TEMP>101
 
Oxycodone HCl/Acetaminophen (Percocet 5-325 MG Tablet) 5 MG-325 MG TABLET
    2 Tablet ORAL Q4H as needed for SEVERE PAIN
 
Bisacodyl (Dulcolax) 10 MG SUPP.RECT
    1 Suppository RECTAL DAILY as needed for CONSTIPATION
 
Na Phos,M-B/Na Phos,Di-Ba (Fleet Enema) 19 GRAM-7 GRAM/118 ML ENEMA
    1 Enema RECTAL DAILY as needed for CONSTIPATION
 
Magnesium Hydroxide (Milk Of Magnesia) 400 MG/5 ML ORAL.SUSP
    30 Milliliters ORAL Every 3 days as needed for CONSTIPATION
 
Naloxone HCl (Narcan) 4 MG/ACTUATION SPRAY
    4 Milligram In the nose As Directed as needed for OPIOID INDUCED RESP. 
DEPRESSIO
 
Umeclidinium Brm/Vilanterol Tr (Anoro Ellipta 62.5-25 Mcg INH) 62.5 MCG-25 MCG/
ACTUATION BLST.W.DEV
    1 PUFF Inhale through mouth DAILY
 
Cefazolin Sodium/D5w (Cefazolin 2 G/50 Ml-D5w Bag) 2 GRAM/50 ML PIGGYBACK
    2 Gram INTRAVEN EVERY 8 HOURS
    Qty = 114
    Instructions:
       You will require one 2mg bag every 8 hrs (3 times a day).
       For a total of 6 weeks. Stop date is Aug 14, 2017.
    This prescription has been renewed
 
Start taking the following new medications:
Cefazolin Sodium/D5w (Cefazolin 2 G/50 Ml-D5w Bag) 2 GRAM/50 ML PIGGYBACK
    2 Gram INTRAVEN EVERY 8 HOURS
    Qty = 1
    Refills = 38
 
 
Copies To:
JAMIE WHITT,VIANNEY
 
Attending MD Review Statement
Documenting Attending:
ALLAN ROTHMAN MD

## 2017-06-30 NOTE — PN- STUDENT
Subjective
Subjective:
Patient seen this am awake but very groggy. Denies chest/abdominal pain, n/v, is
a little short of breath but unchanged from yesterday. Denies headaches/
dizziness. Hip pain is improved from yesterday 
 
Objective
Objective:
 Vital Signs
 
 
 Result Date Time
 
Pulse Ox 96 06/30 0658
 
B/P 140/80 06/30 0658
 
O2 Delivery Nasal Cannula 06/30 0658
 
O2 Flow Rate 2.5L 06/30 0658
 
Temp 97.8 06/30 0658
 
Pulse 66 06/30 0658
 
Resp 20 06/30 0658
 
 
 Intake & Output
 
 
 06/30 0000 06/29 1600 06/29 0800
 
Intake Total 620 320 500
 
Output Total   1000
 
Balance 620 320 -500
 
    
 
Intake,  300 300
 
Intake, Oral 220 20 200
 
Number 2  2
 
Bowel   
 
Movements   
 
Output, Urine   1000
 
 
General: awake, oriented but groggy
Lungs: CTAB, no w/r/r bilaterally
Heart: S1 S2, RRR
Abdomen: soft, non-distended, normoactive bowel sounds, non-tender
Extremities: warm, no edema/erythema, gross motor/sensory function in tact, no 
calf tenderness bilaterally
 L hip: Dressing dry and intact, stained proximally with yellow discharge from 
wound, tender,             firm, hot, mildly erythematous 
 
 
Assessment/Plan
Assessment:
This is an 80 y/o female s/p Left hip ORIF on 5/19/17 with wound sepsis. Pain 
and induration have improved mildly since IR drainage and administration of 
antibiotics IV. 
Plan:
Diet: NPO
Pain: Continue current pain management regimen
DVT ppx - ALPS
GI ppx
Abx: vanco/fortaz for left hip wound sepsis
Pending cultures from IR drainage 
Daily am labs 
OR for washout of left hip ? today or tomorrow
Will discuss with attending

## 2017-06-30 NOTE — PN- ORTHOPEDIC
Subjective
Subjective:
POSTOP CHECK
 
Pt is now s/p washout of the Left hip. A copious amount of pus was drained 
intraoperatively. Pt is seen in pacu and has been complaining of significant 
pain. She became somewhat hypoxic after dilaudid, but tolerated toradol and 
tylenol much better. She reports better comfort now. No nausea. Tahira ice chips. 
 
Objective
Vital Signs and I&Os
Vital Signs
 
 
Date Time Temp Pulse Resp B/P B/P Pulse O2 O2 Flow FiO2
 
     Mean Ox Delivery Rate 
 
06/30 1600      93 Nasal 2.0L 
 
       Cannula  
 
06/30 1401 98.9 80 18 142/80  95 Nasal 2.0L 
 
       Cannula  
 
06/30 1327       Nasal 2.5L 
 
       Cannula  
 
06/30 1259       Nasal 2.5L 
 
       Cannula  
 
06/30 0800      94 Nasal 2.0L 
 
       Cannula  
 
06/30 0658 97.8 66 20 140/80  96 Nasal 2.5L 
 
       Cannula  
 
06/30 0000       Nasal 2.0L 
 
       Cannula  
 
 
 Intake & Output
 
 
 06/30 1600 06/30 0800 06/30 0000 06/29 1600 06/29 0800 06/29 0000
 
Intake Total 50 400 620 320 500 
 
Output Total  400   1000 75
 
Balance 50 0 620 320 -500 -75
 
       
 
Intake, IV  400 400 300 300 
 
Intake, Oral 50 0 220 20 200 
 
Number 1 1 2  2 
 
Bowel      
 
Movements      
 
Output, Urine  400   1000 75
 
Patient 235 lb     235 lb
 
Weight      
 
Weight      Reported by Patient
 
Measurement      
 
Method      
 
 
PACU vitals: 129/58, HR 60, Sat 95% on 2L via NC, T98.7
U/O: 250
TONIA output: 15 and 25.
Physical Exam:
Gen: Pt is resting, but arousable. Drowsy, but verbalizes. 
Ext: L hip dressing is c/d/i. It covers most of the thigh. LE sensation is 
intact and pt is able to move her toes. 
 
Assessment/Plan
Assessment/Plan
Pt is an 80 yo F with a hx of afib, chf, htn, depression, who is now s/p L hip 
washout due to infected hardware (s/p [facility] 5/19/17). 
 
Plan: 
-Pain control with tylenol or morphine as needed. Ok to use toradol if needed. 
-PT consult for mobilization in AM. Non-weight bearing. 
-Will defer DVT ppx to medical team. 
-Plan for dressing change on POD #2. 
-Continue ampicillin as per ID.
-Labs in AM.

## 2017-06-30 NOTE — PN- ORTHOPEDIC
Surgical Brief Attending Note
Brief Attending Note:
Patient seen and examined this evening; images reviewed.  
 
82yo F sustained left hip intertroch/subtroch fracture in May 2017, underwent 
open reduction and intramedullary nail stabilization by Dr. Louis on 5/19/17.  
She reports ongoing pain in the left hip since that time, as well as mild 
drainage from the incision.  Following discharge from [facility], she has been
at [facility].
 
She presented to [facility] earlier this week with increased pain, redness
, and drainage from the left hip incision.  Tmax of 101.9.  Initial orthopedic 
consult by Dr. Fuentes, please refer to his note for additional information. 
Imaging of the left hip showed a large fluid collection; this was drainage by IR
yesterday with removal of 180cc of purulent appearing fluid.  Abx were held 
until fluid was drained.  Cultures grew alpha strep.  
 
Plan for OR today for formal incision, irrigation, and debridement of left hip 
wound.  Hardware will remain in place at this time as her fracture is highly 
comminuted and not yet healed.  Infectious disease has been consulted; 
appreciate their care of this patient.  She is currently on Amoxicillin and a 
PICC line was placed earlier today.

## 2017-06-30 NOTE — RADIOLOGY REPORT
EXAMINATION:
XR PORTABLE CHEST
 
CLINICAL INFORMATION:
Status post PICC placement.
 
COMPARISON:
Chest radiograph done on 06/28/2017.
 
TECHNIQUE:
Portable frontal view of the chest was obtained.
 
FINDINGS:
There is a right-sided PICC line present, new since prior study. The tip of
the catheter is seen projecting at the cavoatrial junction. There is a
right-sided single-lead pacer wire present, appear intact. Nonspecific
bibasilar airspace disease is noted, may represent hypoventilatory changes,
atelectasis, similar to prior study. The remainder of the lung fields appear
clear. The cardiomediastinal silhouette is within normal limits. There is no
pleural effusion.
 
IMPRESSION:
Interval placement of a right-sided PICC line with its tip seen projecting at
the cavoatrial junction. No other significant change.

## 2017-06-30 NOTE — ULTRASOUND REPORT
CLINICAL HISTORY:
The patient is a 81-year-old female with right painful left hip region in the
setting of internal fixation performed on 05/19/2017, who presents to
interventional radiology for aspiration of the subcutaneous fluid collection
in the region of the left hip.
 
PROCEDURES:
1. Limited ultrasound evaluation of the left hip.
2. Ultrasound guided aspiration of the left lower extremity fluid collection
near the hip.
 
PHYSICIANS:
Perri Sutton M.D., Ph.D.
 
COMPLICATIONS: None.
ESTIMATED BLOOD LOSS: <5 mL.
SPECIMENS: A specimen was collected and sent for the requested studies.
 
PROCEDURE NOTE:
Informed consent was obtained from the patient prior to the procedure. During
this process, the procedure and potential alternatives were explained along
with the intended outcome and benefits. The risks of the procedure, including
the possibility of an unsuccessful procedure, as well as the risk of not
doing the procedure, were discussed. The patient was given the opportunity to
ask questions regarding the procedure and appeared competent to make
decisions. A signed consent form documenting this discussion was placed in
the medical record. A time-out procedure was performed.
 
The patient was placed supine on the ultrasound table. A time-out was
performed.
 
The left upper leg/hip was prepped and draped in the usual sterile fashion.
5 mL of 1% lidocaine was used to obtain local anesthesia of the skin and
deeper tissues. A 5 Fr Yueh needle/catheter was passed through the skin into
the collection using ultrasound guidance. 180 mL of cloudy thick nonclotting
bloody fluid was aspirated , divided and sent for the requested studies.
 
The patient tolerated the procedure well.
 
FINDINGS:
180 mL of cloudy thick nonclotting bloody fluid aspirated from a collection
in the left hip area.
 
IMPRESSION:
Successful ultrasound-guided aspiration.
 
PLAN:
The patient was stable after the procedure and was transferred to the
interventional recovery area. The patient will be transferred to the floor.

## 2017-06-30 NOTE — PN- INFECT DX
Subjective
Subjective:
Afebrile without complaints
 
Objective
Last 24 Hrs of Vital Signs/I&O
 Vital Signs
 
 
Date Time Temp Pulse Resp B/P B/P Pulse O2 O2 Flow FiO2
 
     Mean Ox Delivery Rate 
 
06/30 0658 97.8 66 20 140/80  96 Nasal 2.5L 
 
       Cannula  
 
06/30 0000       Nasal 2.0L 
 
       Cannula  
 
06/29 2235 98.5 61 24 160/80  92 Nasal 2.5L 
 
       Cannula  
 
06/29 1747    142/70     
 
06/29 1600       Nasal 2.0L 
 
       Cannula  
 
06/29 1548 99.5 79 24 164/80  99 Nasal 3.0L 
 
       Cannula  
 
 
 Intake & Output
 
 
 06/30 1600 06/30 0800 06/30 0000
 
Intake Total  400 620
 
Output Total  400 
 
Balance  0 620
 
    
 
Intake, IV  400 400
 
Intake, Oral  0 220
 
Number  1 2
 
Bowel   
 
Movements   
 
Output, Urine  400 
 
 
 
 
Physical Exam
Other Physical Findings:
She appears comfortable in no acute distress
Lungs are clear
Heart regular rhythm with no murmur
Extremities left hip incision with yellow drainage on the dressing, with no 
erythema or tenderness on palpation
 
 
Results
Last 24 Hours of Lab Results:
 Laboratory Tests
 
 
 06/30
 
 0645
 
Chemistry 
 
  Sodium (137 - 145 mmol/L) 135  L
 
  Potassium (3.5 - 5.1 mmol/L) 3.6
 
  Chloride (98 - 107 mmol/L) 97  L
 
  Carbon Dioxide (22 - 30 mmol/L) 29
 
  Anion Gap (5 - 16) 10
 
  BUN (7 - 17 mg/dL) 9
 
  Creatinine (0.5 - 1.0 mg/dL) 0.5
 
  Estimated GFR (>60 ml/min) > 60
 
  BUN/Creatinine Ratio (7 - 25 %) 18.0
 
Hematology 
 
  CBC w Diff NO MAN DIFF REQ
 
  WBC (4.8 - 10.8 /CUMM) 11.0  H
 
  RBC (4.20 - 5.40 /CUMM) 3.37  L
 
  Hgb (12.0 - 16.0 G/DL) 10.5  L
 
  Hct (37 - 47 %) 32.9  L
 
  MCV (81.0 - 99.0 FL) 97.7
 
  MCH (27.0 - 31.0 PG) 31.3  H
 
  RDW (11.5 - 14.5 %) 17.2  H
 
  Plt Count (130 - 400 /CUMM) 331
 
  MPV (7.4 - 10.4 FL) 8.1
 
  Gran % (42.2 - 75.2 %) 83.8  H
 
  Lymphocytes % (20.5 - 51.1 %) 7.9  L
 
  Monocytes % (1.7 - 9.3 %) 7.2
 
  Eosinophils % (0 - 5 %) 0.8
 
  Basophils % (0.0 - 2.0 %) 0.3
 
  Absolute Granulocytes (1.4 - 6.5 /CUMM) 9.2  H
 
  Absolute Lymphocytes (1.2 - 3.4 /CUMM) 0.9  L
 
  Absolute Monocytes (0.10 - 0.60 /CUMM) 0.8  H
 
  Absolute Eosinophils (0.0 - 0.7 /CUMM) 0.1
 
  Absolute Basophils (0.0 - 0.2 /CUMM) 0
 
  PUBS MCHC (33.0 - 37.0 G/DL) 32.1  L
 
 
 
Last 24 Hours of Kar Results:
Blood cultures 2 June 28 negative
 
Left hip soft tissue collection aspiration June 29 positive for alpha strep
 
Urine culture June 28 greater than 100,000 colonies of Proteus resistant only to
Nitrofurantoin
 
 
Assessment/Plan
Impression:
Stable status post aspiration of 180 mL of cloudy, thick bloody fluid yesterday 
from a soft tissue collection lateral to the left hip now 6 weeks status post 
ORIF of a left comminuted femoral intertrochantericsubtrochanteric fracture and
must assume that this infection is involving the hardware.  She is apparently 
scheduled for the OR later today for an I&D of the left hip.  With the hardware 
in place this infection may not be cleared without removal of the hardware, 
though it is may not be feasible to do this at this time.  The positive urine 
culture for Proteus is noted and, with her history of dysuria, this can be 
treated along with the alpha strep which was isolated from the left hip 
subcutaneous collection.  She is currently on Vancomycin and Ceftazidime but, 
given the culture results, her antibiotics can be adjusted.
 
Suggestion:
1.  Await OR, apparently scheduled for later today
2.  Follow-up cultures from the recent aspiration and the OR
3.  Discontinue Vancomycin and Ceftazidime
4.  Begin Ampicillin 2 g IV every 6 hours

## 2017-07-01 VITALS — SYSTOLIC BLOOD PRESSURE: 102 MMHG | DIASTOLIC BLOOD PRESSURE: 60 MMHG

## 2017-07-01 VITALS — SYSTOLIC BLOOD PRESSURE: 120 MMHG | DIASTOLIC BLOOD PRESSURE: 60 MMHG

## 2017-07-01 VITALS — DIASTOLIC BLOOD PRESSURE: 60 MMHG | SYSTOLIC BLOOD PRESSURE: 100 MMHG

## 2017-07-01 VITALS — SYSTOLIC BLOOD PRESSURE: 108 MMHG | DIASTOLIC BLOOD PRESSURE: 56 MMHG

## 2017-07-01 LAB
ABSOLUTE GRANULOCYTE CT: 10.2 /CUMM (ref 1.4–6.5)
BASOPHILS # BLD: 0 /CUMM (ref 0–0.2)
BASOPHILS NFR BLD: 0.2 % (ref 0–2)
EOSINOPHIL # BLD: 0 /CUMM (ref 0–0.7)
EOSINOPHIL NFR BLD: 0.4 % (ref 0–5)
ERYTHROCYTE [DISTWIDTH] IN BLOOD BY AUTOMATED COUNT: 17.1 % (ref 11.5–14.5)
GRANULOCYTES NFR BLD: 85.4 % (ref 42.2–75.2)
HCT VFR BLD CALC: 29.4 % (ref 37–47)
LYMPHOCYTES # BLD: 0.9 /CUMM (ref 1.2–3.4)
MCH RBC QN AUTO: 31.5 PG (ref 27–31)
MCHC RBC AUTO-ENTMCNC: 32 G/DL (ref 33–37)
MCV RBC AUTO: 98.5 FL (ref 81–99)
MONOCYTES # BLD: 0.8 /CUMM (ref 0.1–0.6)
PLATELET # BLD: 290 /CUMM (ref 130–400)
PMV BLD AUTO: 8.3 FL (ref 7.4–10.4)
RED BLOOD CELL CT: 2.98 /CUMM (ref 4.2–5.4)
WBC # BLD AUTO: 12 /CUMM (ref 4.8–10.8)

## 2017-07-01 PROCEDURE — 0J9M30Z DRAINAGE OF LEFT UPPER LEG SUBCUTANEOUS TISSUE AND FASCIA WITH DRAINAGE DEVICE, PERCUTANEOUS APPROACH: ICD-10-PCS | Performed by: ORTHOPAEDIC SURGERY

## 2017-07-01 NOTE — PN- INFECT DX
Subjective
Subjective:
Afebrile without complaints
 
Objective
Last 24 Hrs of Vital Signs/I&O
 Vital Signs
 
 
Date Time Temp Pulse Resp B/P B/P Pulse O2 O2 Flow FiO2
 
     Mean Ox Delivery Rate 
 
07/01 1101  62  122/62     
 
07/01 1100  62  122/62     
 
07/01 0622 98.7 59 20 120/60  97 Nasal  
 
       Cannula  
 
07/01 0405 98.2 67 18 108/56  96 Nasal  
 
       Cannula  
 
07/01 0000       Nasal 2.0L 
 
       Cannula  
 
06/30 2330 97.6 61 14 114/58  96 Nasal 2.0L 
 
       Cannula  
 
06/30 1600      93 Nasal 2.0L 
 
       Cannula  
 
06/30 1401 98.9 80 18 142/80  95 Nasal 2.0L 
 
       Cannula  
 
06/30 1327       Nasal 2.5L 
 
       Cannula  
 
06/30 1259       Nasal 2.5L 
 
       Cannula  
 
 
 Intake & Output
 
 
 07/01 1600 07/01 0800 07/01 0000
 
Intake Total  890 
 
Output Total  285 
 
Balance  605 
 
    
 
Intake, IV  650 
 
Intake, Oral  240 
 
Output,  85 
 
Drainage   
 
Output, Urine  200 
 
 
 
 
Physical Exam
Other Physical Findings:
She appears comfortable in no acute distress
Lungs are clear
Heart regular rhythm with no murmur
Extremities left hip dressing intact with drains in place; PICC in place in the 
right upper extremity
 Interiano catheter in place
 
 
Results
Last 24 Hours of Lab Results:
 Laboratory Tests
 
 
 07/01
 
 0605
 
Hematology 
 
  CBC w Diff MAN DIFF ORDERED
 
  WBC (4.8 - 10.8 /CUMM) 12.0  H
 
  RBC (4.20 - 5.40 /CUMM) 2.98  L
 
  Hgb (12.0 - 16.0 G/DL) 9.4  L
 
  Hct (37 - 47 %) 29.4  L
 
  MCV (81.0 - 99.0 FL) 98.5
 
  MCH (27.0 - 31.0 PG) 31.5  H
 
  RDW (11.5 - 14.5 %) 17.1  H
 
  Plt Count (130 - 400 /CUMM) 290
 
  MPV (7.4 - 10.4 FL) 8.3
 
  Gran % (42.2 - 75.2 %) 85.4  H
 
  Lymphocytes % (20.5 - 51.1 %) 7.5  L
 
  Monocytes % (1.7 - 9.3 %) 6.5
 
  Eosinophils % (0 - 5 %) 0.4
 
  Basophils % (0.0 - 2.0 %) 0.2
 
  Absolute Granulocytes (1.4 - 6.5 /CUMM) 10.2  H
 
  Absolute Lymphocytes (1.2 - 3.4 /CUMM) 0.9  L
 
  Absolute Monocytes (0.10 - 0.60 /CUMM) 0.8  H
 
  Absolute Eosinophils (0.0 - 0.7 /CUMM) 0
 
  Absolute Basophils (0.0 - 0.2 /CUMM) 0
 
  Platelet Estimate (ADEQUATE) VERIFIED BY SMEAR
 
  Polychromasia 1+
 
  Anisocytosis 1+
 
  Stomatocytes 1+
 
  PUBS MCHC (33.0 - 37.0 G/DL) 32.0  L
 
 
 
Last 24 Hours of Kar Results:
OR culture left hip wound negative
 
Urine culture June 30 negative
 
 
Assessment/Plan
Impression:
Stable with temperatures remaining normal and white blood cell count minimally 
elevated on Ampicillin status post I&D yesterday of a left hip infection, which 
extended below the fascia, and which presumably involves the hardware, which was
placed 6 weeks ago after an ORIF of a left comminuted femoral intertrochanteric
subtrochanteric fracture.  Her preliminary OR culture is negative, but the 
culture obtained from the aspiration of the soft tissue collection 2 days ago is
positive for alpha strep and anaerobes.  Removal of the hardware was not felt to
be feasible but, it may be difficult to clear this infection without its removal
, and it may be necessary to consider lifelong antibiotic suppression if the 
hardware is not removed.  The positive urine culture for Proteus is noted and, 
with her history of dysuria, she is being treated for this as well with the 
Ampicillin.
 
Suggestion:
1.  Remove Interinao catheter
2.  Follow-up OR culture 
3.  Continue Ampicillin

## 2017-07-01 NOTE — PN- ORTHOPEDIC
**See Addendum**
Subjective
Subjective:
pod#1 s/p i&d left hip wound sepsis
 
no major issues overnight
resting comfortably now
denies cp, sob, no n+v
 
 
Objective
Vital Signs and I&Os
Vital Signs
 
 
Date Time Temp Pulse Resp B/P B/P Pulse O2 O2 Flow FiO2
 
     Mean Ox Delivery Rate 
 
07/01 0622 98.7 59 20 120/60  97 Nasal  
 
       Cannula  
 
07/01 0405 98.2 67 18 108/56  96 Nasal  
 
       Cannula  
 
07/01 0000       Nasal 2.0L 
 
       Cannula  
 
06/30 2330 97.6 61 14 114/58  96 Nasal 2.0L 
 
       Cannula  
 
06/30 1600      93 Nasal 2.0L 
 
       Cannula  
 
06/30 1401 98.9 80 18 142/80  95 Nasal 2.0L 
 
       Cannula  
 
06/30 1327       Nasal 2.5L 
 
       Cannula  
 
06/30 1259       Nasal 2.5L 
 
       Cannula  
 
06/30 0800      94 Nasal 2.0L 
 
       Cannula  
 
 
 Intake & Output
 
 
 07/01 0800 07/01 0000 06/30 1600 06/30 0800 06/30 0000 06/29 1600
 
Intake Total 890  50 400 620 320
 
Output Total 285   400  
 
Balance 605  50 0 620 320
 
       
 
Intake,    400 400 300
 
Intake, Oral 240  50 0 220 20
 
Number   1 1 2 
 
Bowel      
 
Movements      
 
Output, 85     
 
Drainage      
 
Output, Urine 200   400  
 
Patient   235 lb   
 
Weight      
 
 
 
Physical Exam:
cv: rrr
lungs: clear
abd: soft, +bs
ext: drsg dry/intact
      thigh soft
      distal cms intact
      moderate calf tenderness to palp but soft
олег: minimal serosanguinous drainage
foely: clear concentrated urine
 
Assessment/Plan
Assessment/Plan
ortho stable
 
plan
oob to chair
f/u OR cx's and am labs
cont ampicillin
cont strict nwb left le

## 2017-07-02 VITALS — DIASTOLIC BLOOD PRESSURE: 60 MMHG | SYSTOLIC BLOOD PRESSURE: 108 MMHG

## 2017-07-02 VITALS — SYSTOLIC BLOOD PRESSURE: 100 MMHG | DIASTOLIC BLOOD PRESSURE: 60 MMHG

## 2017-07-02 VITALS — DIASTOLIC BLOOD PRESSURE: 80 MMHG | SYSTOLIC BLOOD PRESSURE: 118 MMHG

## 2017-07-02 LAB
ABSOLUTE GRANULOCYTE CT: 6.7 /CUMM (ref 1.4–6.5)
BASOPHILS # BLD: 0.1 /CUMM (ref 0–0.2)
BASOPHILS NFR BLD: 0.6 % (ref 0–2)
EOSINOPHIL # BLD: 0.6 /CUMM (ref 0–0.7)
EOSINOPHIL NFR BLD: 5.8 % (ref 0–5)
ERYTHROCYTE [DISTWIDTH] IN BLOOD BY AUTOMATED COUNT: 17.1 % (ref 11.5–14.5)
GRANULOCYTES NFR BLD: 67.5 % (ref 42.2–75.2)
HCT VFR BLD CALC: 32.7 % (ref 37–47)
LYMPHOCYTES # BLD: 1.7 /CUMM (ref 1.2–3.4)
MCH RBC QN AUTO: 31.2 PG (ref 27–31)
MCHC RBC AUTO-ENTMCNC: 31.6 G/DL (ref 33–37)
MCV RBC AUTO: 98.7 FL (ref 81–99)
MONOCYTES # BLD: 0.9 /CUMM (ref 0.1–0.6)
PLATELET # BLD: 360 /CUMM (ref 130–400)
PMV BLD AUTO: 8.1 FL (ref 7.4–10.4)
RED BLOOD CELL CT: 3.31 /CUMM (ref 4.2–5.4)
WBC # BLD AUTO: 9.9 /CUMM (ref 4.8–10.8)

## 2017-07-02 NOTE — PN- ATT ADDEND
Attending MD Review Statement
 
Attending Statement
Attending MD Statement: examined this patient, discuss w/resident/PA/NP, agreed 
w/resident/PA/NP, reviewed EMR data (avail), discussed w/nursing
Attending Assessment/Plan:
 Laboratory Tests
 
 
 
07/02/17 0605:
Anion Gap 9, Estimated GFR > 60, BUN/Creatinine Ratio 18.3, CBC w Diff NO MAN 
DIFF REQ, RBC 3.31  L, MCV 98.7, MCH 31.2  H, RDW 17.1  H, MPV 8.1, Gran % 67.5,
Lymphocytes % 16.9  L, Monocytes % 9.2, Eosinophils % 5.8  H, Basophils % 0.6, 
Absolute Granulocytes 6.7  H, Absolute Lymphocytes 1.7, Absolute Monocytes 0.9  
H, Absolute Eosinophils 0.6, Absolute Basophils 0.1, PUBS MCHC 31.6  L
Vital Signs
 
 
Date Time Temp Pulse Resp B/P B/P Pulse O2 O2 Flow FiO2
 
     Mean Ox Delivery Rate 
 
07/02 1147  64  116/70     
 
07/02 1147  64  116/70     
 
07/02 0630 97.9 57 18 108/60  91 Room Air  
 
07/02 0000       Nasal 1.0L 
 
       Cannula  
 
07/01 2155 97.6 64 20 102/60  97 Nasal  
 
       Cannula  
 
07/01 1600       Nasal 1.0L 
 
       Cannula  
 
07/01 1450 97.7 72 22 100/60  98   
 
 
Post surgical wound infection after left hip fracuture surgery on 5/19/17, s/p I
& D on 6/30. On iv ampicillin. f/u on wound culutres- growing Staph aureus-abx 
switched to vancomycin on 7/2.
 
UTI- proteus- got few days of ampicillin. asymptomatic currently. 
 
Afib - restarted on pradaxa
 
Anemia- sec to blood loss, monitor closely. Hb better today on 7/2- 10.4
 
d/w pt the care plan. TONIA drain- serosanginous drainage .

## 2017-07-02 NOTE — PN- ORTHOPEDIC
**See Addendum**
Subjective
Subjective:
No acute overnight events reported.  Patient c/o occasional pain but it is 
controlled with pain medications.  She denies chest pain, shortness of breath 
and difficulty breathing.  She denies nausea and vomitting.
 
Objective
Vital Signs and I&Os
Vital Signs
 
 
Date Time Temp Pulse Resp B/P B/P Pulse O2 O2 Flow FiO2
 
     Mean Ox Delivery Rate 
 
07/02 0630 97.9 57 18 108/60  91 Room Air  
 
07/02 0000       Nasal 1.0L 
 
       Cannula  
 
07/01 2155 97.6 64 20 102/60  97 Nasal  
 
       Cannula  
 
07/01 1600       Nasal 1.0L 
 
       Cannula  
 
07/01 1450 97.7 72 22 100/60  98   
 
07/01 1101  62  122/62     
 
07/01 1100  62  122/62     
 
 
 Intake & Output
 
 
 07/02 1600 07/02 0800 07/02 0000 07/01 1600 07/01 0800 07/01 0000
 
Intake Total   890 
 
Output Total  235 410 370 285 
 
Balance  305 -190 1005 605 
 
       
 
Intake, IV  300 20 175 650 
 
Intake, Oral   240 
 
Number    1  
 
Bowel      
 
Movements      
 
Output,  35 60 70 85 
 
Drainage      
 
Output, Urine  200 350 300 200 
 
 
 
Physical Exam:
General:  Alert and oriented x3, no acute distress
Cardiac:  RRR, s1s2
Pulm:  CTA bilaterally
Abdomen:  Soft, non-tender, non-distended
Extremities:  Moves all extremities, distal sensation intact, skin warm and well
perfused.  Bilateral calves soft, no redness, tender to palpation bilaterally, 
pt states it is her baseline pain and not worse.
 
Surgical site:  Left thigh:  Dressing saturated with serous drainage, clean dry 
dressing applied, no surrounding erythema, thigh compartment soft.
 
Assessment/Plan
Assessment/Plan
This is a 81 year old female, POD 2, s/p washout for infected hip wound
 
-Continue ID antibiotic recommendations, is on ampicillin now
-NWB to lle
-Continue to assess for calf swelling, none now, will obtain u/s to r/o dvt if 
changes
-Continue diet as tolerated
-Continue олег drains to self suction, drainage continues to appear serosanginous,
will consdider dc drains based on output
-Will d/w Dr. Joyner
 
 
Core Measures/Miscellaneous
 
Venous Thromboembolism
VTE Risk Factors: Age > 40, Surgery
VTE Contraindications: No Contraindications
VTE Diagnosis: No
VTE Type: NONE
VTE Confirmed by (Test): NONE
 
Beta Blocker
Is Beta Blocker a Home Med? No
 
Antibiotics
Is Patient on Antibiotics? Yes
If Yes: infection

## 2017-07-02 NOTE — NUR
PATIENT ALERT AND ORIENTED X 3. TONIA DRAIN # 1 DISCONTINUED BY MEG TORRES.
DRESSING TO LEFT HIP INCISION REINFORCED X 2. PAIN MEDICATION GIVEN AS
ORDERED. CALL LIGHT WITHIN REACH. WILL CONTINUE TO MONITOR.

## 2017-07-03 VITALS — SYSTOLIC BLOOD PRESSURE: 100 MMHG | DIASTOLIC BLOOD PRESSURE: 60 MMHG

## 2017-07-03 VITALS — DIASTOLIC BLOOD PRESSURE: 76 MMHG | SYSTOLIC BLOOD PRESSURE: 120 MMHG

## 2017-07-03 VITALS — SYSTOLIC BLOOD PRESSURE: 110 MMHG | DIASTOLIC BLOOD PRESSURE: 78 MMHG

## 2017-07-03 LAB
ABSOLUTE GRANULOCYTE CT: 5.2 /CUMM (ref 1.4–6.5)
BASOPHILS # BLD: 0 /CUMM (ref 0–0.2)
BASOPHILS NFR BLD: 0.6 % (ref 0–2)
EOSINOPHIL # BLD: 0.5 /CUMM (ref 0–0.7)
EOSINOPHIL NFR BLD: 6.4 % (ref 0–5)
ERYTHROCYTE [DISTWIDTH] IN BLOOD BY AUTOMATED COUNT: 17 % (ref 11.5–14.5)
GRANULOCYTES NFR BLD: 65.3 % (ref 42.2–75.2)
HCT VFR BLD CALC: 30.1 % (ref 37–47)
LYMPHOCYTES # BLD: 1.5 /CUMM (ref 1.2–3.4)
MCH RBC QN AUTO: 31.7 PG (ref 27–31)
MCHC RBC AUTO-ENTMCNC: 32.4 G/DL (ref 33–37)
MCV RBC AUTO: 97.8 FL (ref 81–99)
MONOCYTES # BLD: 0.7 /CUMM (ref 0.1–0.6)
PLATELET # BLD: 396 /CUMM (ref 130–400)
PMV BLD AUTO: 7.8 FL (ref 7.4–10.4)
RED BLOOD CELL CT: 3.07 /CUMM (ref 4.2–5.4)
WBC # BLD AUTO: 7.9 /CUMM (ref 4.8–10.8)

## 2017-07-03 NOTE — NUR
PT C/O PAIN TO R FOOT, THIS RN NOTICED R TOP OF FOOT AND TOES SWOLLEN, AND
RED, WARM TO TOUCH, MD XIE AWARE.

## 2017-07-03 NOTE — PN- STUDENT
Subjective
Subjective:
Patient is groggy this am. No acute events overnight, patient reports no pain, 
denies chest pain, SOB, n/v, dizziness and headaches. 
 
Objective
Objective:
 Vital Signs
 
 
 Result Date Time
 
Pulse Ox 94 07/03 0645
 
B/P 120/76 07/03 0645
 
O2 Delivery Nasal Cannula 07/03 0645
 
O2 Flow Rate 2.0L 07/03 0645
 
Temp 97.8 07/03 0645
 
Pulse 74 07/03 0645
 
Resp 18 07/03 0645
 
 
 Intake & Output
 
 
 07/03 0000 07/02 1600 07/02 0800
 
Intake Total 480 1100 540
 
Output Total 790 365 235
 
Balance -310 735 305
 
    
 
Intake, IV  250 300
 
Intake, Oral 480 850 240
 
Output, 90 65 35
 
Drainage   
 
Output, Urine 700 300 200
 
 
General: awake, oriented, groggy
Lungs: CTAB, no w/r/r
Heart: S1 S2, RRR
Abdomen: soft, non-tender, normoactive bowel sounds
Extremities: gross motor/sensory function in tact, no edema/erythema, no calf 
tenderness bilaterally, distal pulses palpable 2+
 Incision site: tender to palpation, dressing stained but not saturated, harry-
incisional erythema 
 TONIA drain: distal drain with about 20cc serosanguanous fluid, proximal drain 
hole clean, no              drainage 
 
Assessment/Plan
Assessment:
This is an 82 y/o female s/p left hip ORIF washout on 6/30 for wound sepsis. 
Wound is draining serosanguanous fluid from drain site and onto dressings. Pain 
adequately controlled but  to palpation. 
Plan:
Diet: regular
Pain: continue current pain regimen
Abx: ampicillin and vancomycin for wound sepsis
Dressing changed by myself this am
keep TONIA drain
DVT and GI ppx
OOB to chair 
Will discuss with attending

## 2017-07-03 NOTE — PN- ORTHOPEDIC
Surgical Brief Attending Note
Brief Attending Note:
Patient seen and examined.  Doing well, denies pain.  Was out of bed and stood 
on her right leg yesterday. One drain removed, the other remains. Says her 
grandchildren and great grandchildren came to visit yesterday.
 
Exam: LLE
Incision clean and dry with minimal drainage on dressing.
One drain removed yesterday; one remains with serosanguinous output.
No tenderness to palpation of left thigh. 
Knee flexion to 70 deg with pain
Intact DF/PF
SILT BLE
 
Drain output:
Cultures: deep cultures MSSA, IR aspiration alpha strep
 
A/P: 80yo F POD#3 I&D of left hip infection s/p IM nail placement 5/19/17.  
Started on cefazolin per ID recommendations.  WBC count normalized, afebrile.  
 
1. TTWB LLE; out of bed to chair with PT
2. Pain control
3. D/C drain tomorrow
4. Monitor wound, dressing change daily
5. Cefazolin per ID recommendations for MSSA and alpha strep
6. Resume Pradaxa
7. Dispo pending
 
Plan for follow up with Dr. Louis in clinic 7 days following discharge.  Please
call the clinic at [phone] to schedule an appointment.

## 2017-07-03 NOTE — PN- INFECT DX
Subjective
Subjective:
Afebrile without complaints
 
Objective
Last 24 Hrs of Vital Signs/I&O
 Vital Signs
 
 
Date Time Temp Pulse Resp B/P B/P Pulse O2 O2 Flow FiO2
 
     Mean Ox Delivery Rate 
 
07/03 0924  74  120/76     
 
07/03 0923  74  120/76     
 
07/03 0800       Nasal 1.0L 
 
       Cannula  
 
07/03 0645 97.8 74 18 120/76  94 Nasal 2.0L 
 
       Cannula  
 
07/03 0000      93 Nasal 1.0L 
 
       Cannula  
 
07/02 2212 97.6 74 20 118/80  93 Nasal 4.0L 
 
       Cannula  
 
07/02 1600       Nasal 1.0L 
 
       Cannula  
 
07/02 1451 98.3 68 20 100/60  95   
 
07/02 1147  64  116/70     
 
07/02 1147  64  116/70     
 
 
 Intake & Output
 
 
 07/03 1600 07/03 0800 07/03 0000
 
Intake Total  120 480
 
Output Total  1000 790
 
Balance  -880 -310
 
    
 
Intake, Oral  120 480
 
Output,  50 90
 
Drainage   
 
Output, Urine  950 700
 
 
 
 
Physical Exam
Other Physical Findings:
She appears comfortable in no acute distress
Lungs are clear
Heart regular rhythm with no murmur
Extremities left hip incision clean, with no erythema; drain remains in place; 
right foot erythema over the right great toe and dorsum of the foot, mildly 
tender on palpation
 
 
Results
Last 24 Hours of Lab Results:
 Laboratory Tests
 
 
 07/03 0600
 
Chemistry 
 
  Sodium (137 - 145 mmol/L) 140
 
  Potassium (3.5 - 5.1 mmol/L) 3.7
 
  Chloride (98 - 107 mmol/L) 98
 
  Carbon Dioxide (22 - 30 mmol/L) 34  H
 
  Anion Gap (5 - 16) 8
 
  BUN (7 - 17 mg/dL) 9
 
  Creatinine (0.5 - 1.0 mg/dL) 0.6
 
  Estimated GFR (>60 ml/min) > 60
 
  BUN/Creatinine Ratio (7 - 25 %) 15.0
 
Hematology 
 
  CBC w Diff NO MAN DIFF REQ
 
  WBC (4.8 - 10.8 /CUMM) 7.9
 
  RBC (4.20 - 5.40 /CUMM) 3.07  L
 
  Hgb (12.0 - 16.0 G/DL) 9.7  L
 
  Hct (37 - 47 %) 30.1  L
 
  MCV (81.0 - 99.0 FL) 97.8
 
  MCH (27.0 - 31.0 PG) 31.7  H
 
  RDW (11.5 - 14.5 %) 17.0  H
 
  Plt Count (130 - 400 /CUMM) 396
 
  MPV (7.4 - 10.4 FL) 7.8
 
  Gran % (42.2 - 75.2 %) 65.3
 
  Lymphocytes % (20.5 - 51.1 %) 18.8  L
 
  Monocytes % (1.7 - 9.3 %) 8.9
 
  Eosinophils % (0 - 5 %) 6.4  H
 
  Basophils % (0.0 - 2.0 %) 0.6
 
  Absolute Granulocytes (1.4 - 6.5 /CUMM) 5.2
 
  Absolute Lymphocytes (1.2 - 3.4 /CUMM) 1.5
 
  Absolute Monocytes (0.10 - 0.60 /CUMM) 0.7  H
 
  Absolute Eosinophils (0.0 - 0.7 /CUMM) 0.5
 
  Absolute Basophils (0.0 - 0.2 /CUMM) 0
 
  PUBS MCHC (33.0 - 37.0 G/DL) 32.4  L
 
 
 
Last 24 Hours of Kar Results:
OR culture June 30 left hip wound positive for Staph aureus sensitive to 
Oxacillin
 
Urine culture June 30 negative
 
 
Assessment/Plan
Impression:
Stable with temperatures remaining normal and white blood cell count also normal
on Vancomycin, begun yesterday because of the isolation of Staph aureus (now 
proven to be MSSA) from the OR culture status post I&D 3 days ago of a left hip 
infection, which extended below the fascia, and which presumably involves the 
hardware, which was placed 6 weeks ago for a left comminuted femoral 
intertrochanteric/subtrochanteric fracture.  Of interest her initial culture 
from the aspiration of the soft tissue collection by IR was positive for alpha 
strep, which was not isolated from the OR culture; nevertheless both of these 
pathogens will need to be treated.  Removal of the hardware was not felt to be 
feasible, but it may be difficult to clear this infection without its removal, 
and it may be necessary to consider lifelong antibiotic suppression if the 
hardware is not removed.  Her right foot inflammation is of unclear etiology, 
possibly secondary to a flareup of her osteoarthritis or perhaps gout.  She has 
received 3 days of treatment for the positive urine culture for Proteus which 
should be adequate treatment for a lower urinary tract infection.
 
Suggestion:
1.  Check uric acid
2.  X-ray of the right foot
3.  Check a baseline ESR
4.  Discontinue Vancomycin
5.  Begin Cefazolin 2 g IV every 8 hours

## 2017-07-03 NOTE — RADIOLOGY REPORT
EXAMINATION:
XR FOOT, RIGHT
 
CLINICAL INFORMATION:
Right foot pain. History of gout. Evaluate for crystal induced arthropathy.
 
COMPARISON:
08/05/2009
 
TECHNIQUE:
Right foot, 3 views
 
FINDINGS:
Bone density is diffusely decreased.
 
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
 
Nonosseous calcaneonavicular coalition.
 
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 Subtalar
joint is poorly evaluated due to the foot deformity.
 
Bones have normal alignment at the Lisfranc joint.
 
Surgical changes from remote bunionectomy of the great toe with 17 degrees of
metatarsus primus varus and 27 degrees of hallux valgus. Nonuniform joint
space narrowing and osteophyte formation of the great toe metatarsophalangeal
joint.
 
No soft tissue tophaceous deposits. Chronic osteoarthritis of interphalangeal
joints. No bare area erosions or periostitis.
 
IMPRESSION:
 
1. No radiographic evidence of gouty arthritis.
2. 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
3. Metatarsus primus varus and hallux valgus, status post great toe
bunionectomy.
4. Other findings include chronic osteoarthritis of the talonavicular,
calcaneocuboid and first metatarsophalangeal joints.

## 2017-07-04 VITALS — DIASTOLIC BLOOD PRESSURE: 78 MMHG | SYSTOLIC BLOOD PRESSURE: 108 MMHG

## 2017-07-04 VITALS — SYSTOLIC BLOOD PRESSURE: 124 MMHG | DIASTOLIC BLOOD PRESSURE: 70 MMHG

## 2017-07-04 VITALS — DIASTOLIC BLOOD PRESSURE: 80 MMHG | SYSTOLIC BLOOD PRESSURE: 136 MMHG

## 2017-07-04 LAB
ABSOLUTE GRANULOCYTE CT: 6.1 /CUMM (ref 1.4–6.5)
BASOPHILS # BLD: 0 /CUMM (ref 0–0.2)
BASOPHILS NFR BLD: 0.4 % (ref 0–2)
EOSINOPHIL # BLD: 0.5 /CUMM (ref 0–0.7)
EOSINOPHIL NFR BLD: 5.1 % (ref 0–5)
ERYTHROCYTE [DISTWIDTH] IN BLOOD BY AUTOMATED COUNT: 17.1 % (ref 11.5–14.5)
GRANULOCYTES NFR BLD: 66.7 % (ref 42.2–75.2)
HCT VFR BLD CALC: 31.9 % (ref 37–47)
LYMPHOCYTES # BLD: 1.7 /CUMM (ref 1.2–3.4)
MCH RBC QN AUTO: 31.3 PG (ref 27–31)
MCHC RBC AUTO-ENTMCNC: 32 G/DL (ref 33–37)
MCV RBC AUTO: 97.6 FL (ref 81–99)
MONOCYTES # BLD: 0.8 /CUMM (ref 0.1–0.6)
PLATELET # BLD: 381 /CUMM (ref 130–400)
PMV BLD AUTO: 7.8 FL (ref 7.4–10.4)
RED BLOOD CELL CT: 3.27 /CUMM (ref 4.2–5.4)
WBC # BLD AUTO: 9.1 /CUMM (ref 4.8–10.8)

## 2017-07-04 NOTE — PN- ORTHOPEDIC
**See Addendum**
Subjective
Subjective:
Awake, alert
No complaints overnight
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 
ambulated independently
Pain is well controlled
 
Objective
Vital Signs and I&Os
Vital Signs
 
 
Date Time Temp Pulse Resp B/P B/P Pulse O2 O2 Flow FiO2
 
     Mean Ox Delivery Rate 
 
07/04 0647 97.9 61 20 108/78  94 Nasal 1.0L 
 
       Cannula  
 
07/04 0000      91 Nasal 1.0L 
 
       Cannula  
 
07/03 2206 97.7 55 18 110/78  94 Nasal 1.0L 
 
       Cannula  
 
07/03 1407 98.4 61 20 100/60  90 Room Air  
 
07/03 1109       Nasal 2.5L 
 
       Cannula  
 
07/03 0924  74  120/76     
 
07/03 0923  74  120/76     
 
 
 Intake & Output
 
 
 07/04 1600 07/04 0800 07/04 0000 07/03 1600 07/03 0800 07/03 0000
 
Intake Total  340 390 730 120 480
 
Output Total  80 260  1000 790
 
Balance  260 130 730 -880 -310
 
       
 
Intake, IV  100 150 250  
 
Intake, Oral  240 240 480 120 480
 
Number   2 0  
 
Bowel      
 
Movements      
 
Output,  80 60  50 90
 
Drainage      
 
Output, Urine   200  950 700
 
 
 
Physical Exam:
General: alert and oriented times three
Ext: warm, trace edema LLE
Wound: dressing changed, dry although reported oozing serous fluid requirinig 
dressing changes
Sutures intact
TONIA drain removed - serous drainage noted
No erythema
 
Assessment/Plan
Assessment/Plan
A/P: 82yo F POD#4 I&D of left hip infection s/p IM nail placement 5/19/17.  
Started on cefazolin last evening per ID recommendations
 
1. TTWB LLE; out of bed to chair with PT - today she had PT in bed - she is an 
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
2. Pain management
3. drain removed per Dr Joyner recommendations
4. Monitor wound, dry dressing change daily
5. Abx per ID
6. Continue Pradaxa
7. DC planning per primary team
8.  FU with Dr Louis one week after discharge
 
 
Core Measures/Miscellaneous
 
Venous Thromboembolism
VTE Risk Factors: Age > 40, Surgery
VTE Contraindications: No Contraindications
VTE Diagnosis: No
VTE Type: NONE
VTE Confirmed by (Test): NONE
 
Beta Blocker
Is Beta Blocker a Home Med? No
 
Antibiotics
Is Patient on Antibiotics? Yes
If Yes: infection

## 2017-07-04 NOTE — PN- ATT ADDEND
Attending MD Review Statement
 
Attending Statement
Attending MD Statement: examined this patient, discuss w/resident/PA/NP, agreed 
w/resident/PA/NP, discussed with family, reviewed EMR data (avail), discussed w/
nursing
Attending Assessment/Plan:
Diarrhea- sent stool for c diff, started on lactobacillus. will f/u on the c 
diff. d/w pt the care plan. 
 
Cont on cefazolin for surgical site infection . TONIA drain removed.

## 2017-07-04 NOTE — PATIENT DISCHARGE INSTRUCTIONS
Discharge Instructions
 
General Discharge Information
You had these procedures:
Washout of left hip wound.
Watch for these problems:
Increased redness or drainage of wound
Do not soak the wound: Yes
No bath, but you may shower: Yes
Other wound care:
Keep incision clean and dry.  Daily dry dressing change.
Special Instructions:
1. Plan for follow up with Dr. Louis in clinic 7 days following discharge.  
Please call the clinic at [phone] to schedule an appointment. 
 
2. Pt requires 1L o2 over night at times after surgery. Goal sat is >92%. Can 
con't 1L o2 if necessary. Encourage IS and follow up with pulmonologist.
 
3. Continue Cefazolin to complete a six-week course of IV antibiotics (until 
August 13), after which she will likely require lifelong suppression with an 
oral antibiotic, such as Keflex.
 
4.  Weekly ESR while on IV antibiotics
 
Activity
Activity Self Limited: Yes
Other activity limits:
Toe touch weight bear
 
Acute Coronary Syndrome
 
Inclusion Criteria
At DC or during hospital stay patient has or had the following:
 
Discharge Core Measures
Meds if any: Prescribed or Continued at Discharge
Meds if any: NOT Prescribed or Continued at Discharge
 
Congestive Heart Failure
 
Inclusion Criteria
At DC or during hospital stay patient has or had the following:
 
Discharge Core Measures
Meds if any: Prescribed or Continued at Discharge
Meds if any: NOT Prescribed or Continued at Discharge
 
Cerebrovascular accident
 
Inclusion Criteria
At DC or during hospital stay patient has or had the following:
CVA/TIA Diagnosis No
 
Discharge Core Measures
Meds if any: Prescribed or Continued at Discharge
Meds if any: NOT Prescribed or Continued at Discharge
 
Venous thromboembolism
 
Discharge Core Measures
- Per Current guidelines, there needs to be overlap
- treatment for the first 5 days of Warfarin therapy.
- If discharged on Warfarin prior to 5 days of
- overlap therapy, the patient will need to be
- assessed for post discharge needs including
- *Post discharge parental anticoagulation
- *Warfarin and/or parental anticoagulation education
- *Follow up date to check INR post discharge
Meds if any: Prescribed or Continued at Discharge
Note: Overlap Therapy is Warfarin and Anticoagulant
Meds if any: NOT Prescribed or Continued at Discharge

## 2017-07-05 VITALS — DIASTOLIC BLOOD PRESSURE: 70 MMHG | SYSTOLIC BLOOD PRESSURE: 110 MMHG

## 2017-07-05 VITALS — SYSTOLIC BLOOD PRESSURE: 120 MMHG | DIASTOLIC BLOOD PRESSURE: 70 MMHG

## 2017-07-05 VITALS — SYSTOLIC BLOOD PRESSURE: 140 MMHG | DIASTOLIC BLOOD PRESSURE: 72 MMHG

## 2017-07-05 LAB
ABSOLUTE GRANULOCYTE CT: 6.8 /CUMM (ref 1.4–6.5)
BASOPHILS # BLD: 0.1 /CUMM (ref 0–0.2)
BASOPHILS NFR BLD: 0.6 % (ref 0–2)
EOSINOPHIL # BLD: 0.4 /CUMM (ref 0–0.7)
EOSINOPHIL NFR BLD: 3.9 % (ref 0–5)
ERYTHROCYTE [DISTWIDTH] IN BLOOD BY AUTOMATED COUNT: 16.8 % (ref 11.5–14.5)
GRANULOCYTES NFR BLD: 74.8 % (ref 42.2–75.2)
HCT VFR BLD CALC: 31.7 % (ref 37–47)
LYMPHOCYTES # BLD: 1.3 /CUMM (ref 1.2–3.4)
MCH RBC QN AUTO: 30.9 PG (ref 27–31)
MCHC RBC AUTO-ENTMCNC: 31.9 G/DL (ref 33–37)
MCV RBC AUTO: 97.1 FL (ref 81–99)
MONOCYTES # BLD: 0.6 /CUMM (ref 0.1–0.6)
PLATELET # BLD: 378 /CUMM (ref 130–400)
PMV BLD AUTO: 7.5 FL (ref 7.4–10.4)
RED BLOOD CELL CT: 3.27 /CUMM (ref 4.2–5.4)
WBC # BLD AUTO: 9.1 /CUMM (ref 4.8–10.8)

## 2017-07-05 NOTE — NUR
PT AMBULATED TO CHAIR, AND C/O OF NAUSEA AND CHEST PRESSURE, W/ INABILITY TO
CATCH HER BREATH, O2 @ 93% 1L NC , BP STABLE /84. MD LESTER NOTIFIED, TROP
SENT, EKG OBTAINED, CHEST XRAY ORDERED, PT MOVED BACK TO BED. CALL BELL WITHIN
REACH, WILL CONTINUE TO MONITOR.

## 2017-07-05 NOTE — RADIOLOGY REPORT
EXAMINATION:
XR CHEST
 
CLINICAL INFORMATION:
Shortness of breath.
 
COMPARISON:
CXR from 06/28/2017 and 06/30/2017
 
TECHNIQUE:
2 views of the chest were obtained.
 
FINDINGS:
Lungs are suboptimally evaluated due to patient's large body habitus.
 
Again noted is a large cardiac silhouette and congested appearance of central
pulmonary vessels. No interstitial edema or overt pleural effusion. The
single cardiac pacing lead terminates over the apex of the right ventricle.
 
Lungs are hypoinflated and right diaphragm slightly elevated. The hazy
opacity in the right lower lobe is slightly increased compared to the prior
exams.
 
Again noted is osteoarthritis of bilateral glenohumeral joints. No acute
osseous abnormality.
 
IMPRESSION:
 
1. Cardiomegaly and central vascular congestion without acute pulmonary edema.
2. The hazy opacity in the right lower lobe from atelectasis and/or
infiltrate is slightly increased compared to the prior radiographic exams.

## 2017-07-05 NOTE — NUR
CHEST XRAY OBTAINED, MD ORDERED 20 MG IV LASIX, ADMINISTERED TO PATIENT, PT
REPORTED RELIEF, WILL CONTINUE TO MONITOR.

## 2017-07-05 NOTE — PN- ORTHOPEDIC
**See Addendum**
Subjective
Subjective:
no extremity pain.  limited oob.  c/o "feeling  gassy", + bm, +flatus, no n/v, 
tina diet
 
Objective
Vital Signs and I&Os
Vital Signs
 
 
Date Time Temp Pulse Resp B/P B/P Pulse O2 O2 Flow FiO2
 
     Mean Ox Delivery Rate 
 
07/05 0612 98.0 60 20 140/72  96 Nasal 1.0L 
 
       Cannula  
 
07/04 2220 98.1 62 20 136/80  98   
 
07/04 1600       Nasal 1.0L 
 
       Cannula  
 
07/04 1415 98.3 62 18 124/70  96 Nasal 1.0L 
 
       Cannula  
 
07/04 0905  62  108/70     
 
07/04 0905  62  108/70     
 
 
 Intake & Output
 
 
 07/05 1600 07/05 0800 07/05 0000 07/04 1600 07/04 0800 07/04 0000
 
Intake Total  390 580 500 340 390
 
Output Total     80 260
 
Balance  390 580 500 260 130
 
       
 
Intake, IV  150 100 50 100 150
 
Intake, Oral  240 480 450 240 240
 
Number    3  2
 
Bowel      
 
Movements      
 
Output,     80 60
 
Drainage      
 
Output, Urine      200
 
 
 
Physical Exam:
 
 
GEN: NAD
CARD: s1s2 RRR
PULM: decreased at bases
ABD: obese soft nt 
EXT: Left hip wound- dressing with serous drainage, changed. former TONIA drain 
site clean with serous drainage, incision with surrounding edema, no erythema, +
serous drainge, no foul smell, no purulence, sutures intact.  BL feet warm, 
gross sensate/motor intact LE
Current Medications:
 Current Medications
 
 
  Sig/Shauna Start time  Last
 
Medication Dose Route Stop Time Status Admin
 
Acetaminophen 650 MG Q6P PRN 06/30 2130 AC 07/01
 
  PO   2128
 
Cefazolin Sodium 2 GM IQ8 07/03 1600 AC 07/05
 
N/A 1 UNIT IV   0022
 
Dabigatran 150 MG BID 07/01 1000 AC 07/04
 
  PO   2112
 
Furosemide 40 MG DAILY 07/01 1000 AC 07/04
 
  PO   0905
 
Guaifenesin 600 MG Q12 06/30 2200 AC 07/04
 
  PO   2112
 
Lactobacillus  1 CAP BID 07/04 1229 AC 07/04
 
Acidophilus  PO   2112
 
Levothyroxine Sodium 0.175 MG DAILY AC 07/01 0700 AC 07/05
 
  PO   0526
 
Losartan Potassium 25 MG DAILY 07/01 1000 AC 07/04
 
  PO   0905
 
Metoprolol Succinate 50 MG DAILY 07/03 1000 AC 07/04
 
  PO   0905
 
Morphine Sulfate 2 MG Q4P PRN 06/30 2130 AC 07/03
 
  IV   1058
 
Nystatin 1 SARA BID PRN 07/02 0915 AC 
 
  TOP   
 
Oxycodone/ 1 TAB Q6P PRN 07/02 0830 AC 07/05
 
Acetaminophen  PO   0149
 
Paroxetine HCl 40 MG DAILY 07/01 1000 AC 07/04
 
  PO   0905
 
Polyethylene Glycol 17 GM AT BEDTIME 06/30 2200 AC 07/03
 
  PO   2053
 
Potassium Chloride 20 MEQ BID 07/03 2200 AC 07/04
 
  PO   2112
 
Pravastatin Sodium 10 MG DAILY 07/01 1000 AC 07/04
 
  PO   0905
 
Ramelteon 8 MG AT BEDTIME AS NEED.. 06/30 2130 AC 07/03
 
  PO   2054
 
Senna/Docusate Sodium 2 TAB AT BEDTIME 06/30 2200 AC 07/03
 
  PO   2053
 
 
 
 
Results
Last 48 Hours of Labs:
 Laboratory Tests
 
 
 07/05 07/04
 
 0530 0540
 
Hematology  
 
  CBC w Diff NO MAN DIFF REQ NO MAN DIFF REQ
 
  WBC (4.8 - 10.8 /CUMM) 9.1 9.1
 
  RBC (4.20 - 5.40 /CUMM) 3.27  L 3.27  L
 
  Hgb (12.0 - 16.0 G/DL) 10.1  L 10.2  L
 
  Hct (37 - 47 %) 31.7  L 31.9  L
 
  MCV (81.0 - 99.0 FL) 97.1 97.6
 
  MCH (27.0 - 31.0 PG) 30.9 31.3  H
 
  RDW (11.5 - 14.5 %) 16.8  H 17.1  H
 
  Plt Count (130 - 400 /CUMM) 378 381
 
  MPV (7.4 - 10.4 FL) 7.5 7.8
 
  Gran % (42.2 - 75.2 %) 74.8 66.7
 
  Lymphocytes % (20.5 - 51.1 %) 14.3  L 18.9  L
 
  Monocytes % (1.7 - 9.3 %) 6.4 8.9
 
  Eosinophils % (0 - 5 %) 3.9 5.1  H
 
  Basophils % (0.0 - 2.0 %) 0.6 0.4
 
  Absolute Granulocytes (1.4 - 6.5 /CUMM) 6.8  H 6.1
 
  Absolute Lymphocytes (1.2 - 3.4 /CUMM) 1.3 1.7
 
  Absolute Monocytes (0.10 - 0.60 /CUMM) 0.6 0.8  H
 
  Absolute Eosinophils (0.0 - 0.7 /CUMM) 0.4 0.5
 
  Absolute Basophils (0.0 - 0.2 /CUMM) 0.1 0
 
  PUBS MCHC (33.0 - 37.0 G/DL) 31.9  L 32.0  L
 
  ESR Westergren (0 - 20 MM)  35  H
 
 
 
 
Assessment/Plan
Assessment/Plan
A:  POD5 sp washout L hip (IM nail 5/19/17), limited OOB, on abx per ID with 
stable WBC.
 
P:
OOB- TTWB, PT
dry dressing change daily, monitor wound for signs of infection
ABX per ID
Pradaxa
FU with Dr. Louis 1wk following hospital DC
Care per primary team

## 2017-07-06 VITALS — SYSTOLIC BLOOD PRESSURE: 128 MMHG | DIASTOLIC BLOOD PRESSURE: 70 MMHG

## 2017-07-06 VITALS — SYSTOLIC BLOOD PRESSURE: 110 MMHG | DIASTOLIC BLOOD PRESSURE: 60 MMHG

## 2017-07-06 NOTE — PN- ORTHOPEDIC
Subjective
Subjective:
Patient in very good spirits.  Denies any meaningful left hip pain.  No 
complaints today of chest pain or SOB.
 
Objective
Vital Signs and I&Os
Vital Signs
 
 
Date Time Temp Pulse Resp B/P B/P Pulse O2 O2 Flow FiO2
 
     Mean Ox Delivery Rate 
 
07/06 0649 98.6 65 20 128/70  97 Nasal  
 
       Cannula  
 
07/06 0000       Nasal 1.0L 
 
       Cannula  
 
07/05 2232 98.0 63 20 110/70  98 Nasal 1.0L 
 
       Cannula  
 
07/05 1502 97.8 68 18 120/70  96   
 
07/05 0852  60  140/72     
 
07/05 0851  60  140/72     
 
07/05 0800       Nasal 1.0L 
 
       Cannula  
 
 
 Intake & Output
 
 
 07/06 0800 07/06 0000 07/05 1600 07/05 0800 07/05 0000 07/04 1600
 
Intake Total 300 200 460 390 580 500
 
Output Total 350 200    
 
Balance -50 0 460 390 580 500
 
       
 
Intake,   100 150 100 50
 
Intake, Oral 200 200 360 240 480 450
 
Number 0 1 0   3
 
Bowel      
 
Movements      
 
Output, Urine 350 200    
 
 
 
Physical Exam:
Still draining moderate amounts of serosanguinous drainage from left hip wound. 
Suture line intact.  Mild swelling and minimal if any erythema.  Moving around 
quite well in bed.  Gait not tested today.  Left hip motion increasing.  Motor 
and sensory function are intact.
 
Assessment/Plan
Assessment/Plan
Assessment:  Stable with continued slow healing and moderate serosanguinous 
drainage now roughly 1 week status post I&D left hip wound infection.  On 
appropriate IV antibiotic coverage.
 
Plan:  Longterm antibiotic coverage for treamtent of infection and suppression 
of infection recurrence.
 
** Dressing changes BID**  . When patient goes to rehab facility the dressings 
should still be changed BID and the wound painted with betadine with each 
dressing change.  
 
Follow up with Dr. Louis about 7-10 days after discharge from [place].
 
 
Core Measures/Miscellaneous
 
Venous Thromboembolism
VTE Risk Factors: Age > 40, Surgery
VTE Contraindications: No Contraindications
VTE Diagnosis: No
VTE Type: NONE
VTE Confirmed by (Test): NONE
 
Beta Blocker
Is Beta Blocker a Home Med? No
 
Antibiotics
Is Patient on Antibiotics? Yes
If Yes: infection

## 2017-07-06 NOTE — PN- INFECT DX
Subjective
Subjective:
Afebrile without complaints
 
Objective
Last 24 Hrs of Vital Signs/I&O
 Vital Signs
 
 
Date Time Temp Pulse Resp B/P B/P Pulse O2 O2 Flow FiO2
 
     Mean Ox Delivery Rate 
 
07/06 0841  65  128/70     
 
07/06 0840  65  128/70     
 
07/06 0800       Nasal 1.0L 
 
       Cannula  
 
07/06 0649 98.6 65 20 128/70  97 Nasal  
 
       Cannula  
 
07/06 0000       Nasal 1.0L 
 
       Cannula  
 
07/05 2232 98.0 63 20 110/70  98 Nasal 1.0L 
 
       Cannula  
 
07/05 1502 97.8 68 18 120/70  96   
 
 
 Intake & Output
 
 
 07/06 1600 07/06 0800 07/06 0000
 
Intake Total  300 200
 
Output Total  350 200
 
Balance  -50 0
 
    
 
Intake, IV  100 
 
Intake, Oral  200 200
 
Number  0 1
 
Bowel   
 
Movements   
 
Output, Urine  350 200
 
 
 
 
Physical Exam
Other Physical Findings:
She appears comfortable in no acute distress
Lungs are clear
Heart regular rhythm with no murmur
Abdomen is soft, nontender with positive bowel sounds
Extremities left hip incision clean, with no erythema or active drainage; PICC 
in the right upper extremity with no inflammation at the site
 
 
Results
Last 24 Hours of Lab Results:
 Laboratory Tests
 
 
 07/05 07/05
 
 1032 0530
 
Chemistry  
 
  Troponin I (< 0.11 ng/ml) < 0.01 
 
Hematology  
 
  CBC w Diff  NO MAN DIFF REQ
 
  WBC (4.8 - 10.8 /CUMM)  9.1
 
  RBC (4.20 - 5.40 /CUMM)  3.27  L
 
  Hgb (12.0 - 16.0 G/DL)  10.1  L
 
  Hct (37 - 47 %)  31.7  L
 
  MCV (81.0 - 99.0 FL)  97.1
 
  MCH (27.0 - 31.0 PG)  30.9
 
  RDW (11.5 - 14.5 %)  16.8  H
 
  Plt Count (130 - 400 /CUMM)  378
 
  MPV (7.4 - 10.4 FL)  7.5
 
  Gran % (42.2 - 75.2 %)  74.8
 
  Lymphocytes % (20.5 - 51.1 %)  14.3  L
 
  Monocytes % (1.7 - 9.3 %)  6.4
 
  Eosinophils % (0 - 5 %)  3.9
 
  Basophils % (0.0 - 2.0 %)  0.6
 
  Absolute Granulocytes (1.4 - 6.5 /CUMM)  6.8  H
 
  Absolute Lymphocytes (1.2 - 3.4 /CUMM)  1.3
 
  Absolute Monocytes (0.10 - 0.60 /CUMM)  0.6
 
  Absolute Eosinophils (0.0 - 0.7 /CUMM)  0.4
 
  Absolute Basophils (0.0 - 0.2 /CUMM)  0.1
 
  PUBS MCHC (33.0 - 37.0 G/DL)  31.9  L
 
 
 
Last 24 Hours of Kar Results:
No recent cultures
 
Recent Imaging Studies:
Chest x-ray July 5, personally reviewed, reveals central vascular congestion 
with a hazy opacity in the right lower lobe, without any significant change from
her previous x-ray
 
X-ray of the right foot July 3 no evidence of gouty arthritis
 
 
Assessment/Plan
Impression:
Stable with temperatures and white blood cell count remaining normal on 
Cefazolin Day 4 of treatment for MSSA surgical site infection/osteomyelitis now 
6 days status post post I&D of a left hip infection, which extended below the 
fascia, and which presumably involves the hardware which was placed 6 weeks 
prior to admission for a left comminuted femoral intertrochanteric/
subtrochanteric fracture.  As it may be difficult to clear this infection 
without removal of the hardware, which is not apparently feasible, it may be 
necessary to consider lifelong antibiotic suppression. 
 
Suggestion:
1.  Continue Cefazolin to complete a six-week course of IV antibiotics (until 
August 13), after which she will likely require lifelong suppression with an 
oral antibiotic, such as Keflex
2.  Weekly ESR while on IV antibiotics

## 2017-07-06 NOTE — RADIOLOGY REPORT
EXAMINATION:
XR CHEST
 
CLINICAL INFORMATION:
Shortness of breath.
 
COMPARISON:
07/05/2017
 
TECHNIQUE:
2 views of the chest were obtained.
 
FINDINGS:
Large body habitus.
 
Linear and curvilinear opacities of subsegmental atelectasis are present in
lower lung zones. The right basilar opacity appears decreased and the right
diaphragm is now more sharply defined.
 
The lateral radiograph shows persistent opacification in the lower lobes.
Probable trace bilateral pleural effusions, as well.
 
Again noted is the large cardiac silhouette and prominent central pulmonary
vessels without overt interstitial edema. The single cardiac pacing lead
terminates over the apex of the right ventricle.
 
IMPRESSION:
 
1. Cardiomegaly and pulmonary vascular congestion without overt pulmonary
edema.
2. Persistent atelectasis and/or infiltrates in lower lobes and probable
trace pleural effusions. The aeration of the right lung base is slightly
improved compared to 07/05/2017.

## 2017-07-06 NOTE — NUR
1756 ALERT AND ORIENTED X 3. ON 1L O2 VIA NC. EXERTIONAL SHORTNESS OF BREATH
VITAL SIGNS STABLE. DENIES CHEST PAIN. + PULSES. DENIES NUMBNESS/TINGLING
TRACE EDEMA TO BLE. DSG TO L HIP IS C/D/I.
NO DISTRESS OR DISCOMFORT NOTED

## 2018-04-20 ENCOUNTER — HOSPITAL ENCOUNTER (OUTPATIENT)
Dept: HOSPITAL 68 - STS | Age: 83
End: 2018-04-20
Attending: SURGERY
Payer: COMMERCIAL

## 2018-04-20 VITALS — WEIGHT: 198 LBS | BODY MASS INDEX: 33.8 KG/M2 | HEIGHT: 64 IN

## 2018-04-20 DIAGNOSIS — I25.10: ICD-10-CM

## 2018-04-20 DIAGNOSIS — Z85.3: ICD-10-CM

## 2018-04-20 DIAGNOSIS — Z95.0: ICD-10-CM

## 2018-04-20 DIAGNOSIS — C50.411: Primary | ICD-10-CM

## 2018-04-20 DIAGNOSIS — Z17.1: ICD-10-CM

## 2018-04-20 DIAGNOSIS — E07.9: ICD-10-CM

## 2018-04-20 DIAGNOSIS — I10: ICD-10-CM

## 2018-04-20 DIAGNOSIS — I48.91: ICD-10-CM

## 2018-04-20 DIAGNOSIS — Z87.891: ICD-10-CM

## 2018-04-20 DIAGNOSIS — C77.3: ICD-10-CM

## 2018-04-20 NOTE — OPERATIVE REPORT
**See Addendum**
Operative/Inv Procedure Report
Surgery Date: 04/20/18
Name of Procedure:
Right partial mastectomy and sentinel lymph node biopsy
Pre-Operative Diagnosis:
Right breast cancer
Post-Operative Diagnosis:
Same
Estimated Blood Loss: less than 50ml
Surgeon/Assistant:
Radha Levy MD
 
Anesthesia: laryngeal mask airway
Specimens:
Right lumpectomy, cranial margin, caudal margin, deep margin, medial margin, 
sentinel lymph node, palpable lymph nodes
 
Operative/Procedure Note
Note:
Patient presented with palpable right breast mass which showed invasive 
carcinoma.  She is brought to the operating room for partial mastectomy and 
sentinel lymph node biopsy.  Preoperative lymphoscintigraphy was performed and 
those films reviewed.  Brought to the operating room placed under anesthesia.  
2g of Ancef was given and the right breast is prepped and draped in a sterile 
fashion using ChloraPrep.  3 mL of methylene blue diluted with 2 mL of saline 
was injected in the retroareolar fashion.  The palpable mass was identified in 
the upper outer quadrant.  An ellipse of skin was excised over the palpable 
mass.  The breast tissues dissected down to the clavipectoral fascia and the 
specimen was removed and marked for orientation and margin map.  Intraoperative 
x-ray confirmed the presence of the clip in the specimen.  The axilla was 
approached through the same incision and there was a palpable lymph node 
identified.  This was excised.  He sentinel lymph node was then identified with 
increased radionucleotide uptake this was marked as sentinel lymph node.  This 
was adjacent to 1 or 2 other palpable lymph nodes which were taken as well.  
Decision was made to not pursue complete axillary dissection due to risk of 
complications.  There were no other palpable lymph nodes in the axilla.  
Hemostasis was achieved using electrocautery.  The margins of the dissection 
were marked using mammary clips.  Deep tissue was proximal made using 
interrupted Vicryl sutures and skin was closed in running Biosyn subcutaneous 
color stitch.  Steri-Strips and sterile dressings were applied and patient 
transferred to the recovery room in satisfactory condition having tolerated the 
procedure well.

## 2019-02-14 NOTE — PN- HOUSESTAFF
**See Addendum**
Subjective
Follow-up For:
POD # 6Surgical site infection
Complaints: no complaints
Subjective:
I have seen and examined the patient.  She is lying comfortably in bed.  She 
does not have any urinary urgency pain or burning.  She does not complain of any
pain.  She denies any diarrhea.  She states she had a panic attack yesterday.  
It was like a lump sitting on her chest.  Today she is feeling much better.  She
has no current complaints.  There were no overnight events.  She does not 
complain of chest pain shortness of breath or palpitations.
 
 
Review of Systems
Constitutional:
Denies: chills, fever, malaise. 
Cardiovascular:
Denies: chest pain, palpitations. 
Respiratory:
Denies: cough, short of breath. 
Gastrointestinal:
Denies: constipation, nausea, changes in stool, vomiting. 
Genitourinary:
Denies: discharge, dysuria, frequency, hesitation, pain, urgency. 
Skin:
Denies: no symptoms. 
 
Objective
Last 24 Hrs of Vital Signs/I&O
 Vital Signs
 
 
Date Time Temp Pulse Resp B/P B/P Pulse O2 O2 Flow FiO2
 
     Mean Ox Delivery Rate 
 
/ 0841  65  128/70     
 
/06 0840  65  128/70     
 
07/06 0649 98.6 65 20 128/70  97 Nasal  
 
       Cannula  
 
/ 0000       Nasal 1.0L 
 
       Cannula  
 
 2232 98.0 63 20 110/70  98 Nasal 1.0L 
 
       Cannula  
 
/ 1502 97.8 68 18 120/70  96   
 
07/05 0852  60  140/72     
 
07/05 0851  60  140/72     
 
 
 Intake & Output
 
 
 / 1600 /06 0800 07/ 0000
 
Intake Total  300 200
 
Output Total  350 200
 
Balance  -50 0
 
    
 
Intake, IV  100 
 
Intake, Oral  200 200
 
Number  0 1
 
Bowel   
 
Movements   
 
Output, Urine  350 200
 
 
 
 
Physical Exam
General Appearance: Alert, Oriented X3, Cooperative, No Acute Distress
Skin: Picc line right upper arm, no reness swelling or pain, mild rash under 
breast both sides and lower abd line
Skin Temp/Moisture Exam: Warm/Dry
Neck: Supple
Cardiovascular: Normal S1, Normal S2, No Murmurs
Lungs: Clear to Auscultation, Normal Air Movement
Abdomen: Normal Bowel Sounds, Soft, No Tenderness
Neurological: Normal Speech
Extremities: Drainms have been removed. Dressing is clean and dry. there is no 
erythema or induration Bilateral calves soft, no redness, tender to palpation 
bilaterally, pt states it is her baseline pain and not worse., right foot 
erythema over the right great toe and dorsum of the foot, mildly tender on 
palpation
Current Medications:
 Current Medications
 
 
  Sig/Shauna Start time  Last
 
Medication Dose Route Stop Time Status Admin
 
Acetaminophen 650 MG Q6P PRN  2130 AC 
 
  PO   2128
 
Cefazolin Sodium 2 GM IQ8  1600 AC 
 
N/A 1 UNIT IV   0837
 
Dabigatran 150 MG BID  1000 AC 
 
  PO   0841
 
Furosemide 40 MG .STK-MED ONE  1302 DC 
 
  IV  1303  
 
Furosemide 20 MG ONCE ONE  1230 DC 
 
  IV  1231  1304
 
Furosemide 40 MG DAILY  1000 AC 
 
  PO   0840
 
Guaifenesin 600 MG Q12  2200 AC 
 
  PO   0840
 
Lactobacillus  1 CAP BID  1229 AC 
 
Acidophilus  PO   0840
 
Levothyroxine Sodium 0.175 MG DAILY AC  0700 AC 
 
  PO   0551
 
Losartan Potassium 25 MG DAILY  1000 AC 
 
  PO   0840
 
Metoprolol Succinate 50 MG DAILY  1000 AC 
 
  PO   0841
 
Morphine Sulfate 2 MG Q4P PRN  2130 AC 
 
  IV   1058
 
Nystatin 1 SARA BID PRN  0915 AC 
 
  TOP   
 
Oxycodone/ 1 TAB Q6P PRN  0830 AC 
 
Acetaminophen  PO   0553
 
Paroxetine HCl 40 MG DAILY  1000 AC 
 
  PO   0840
 
Polyethylene Glycol 17 GM AT BEDTIME 2200 AC 
 
  PO   2053
 
Potassium Chloride 20 MEQ BID  2200 AC 
 
  PO   0841
 
Pravastatin Sodium 10 MG DAILY  1000 AC 
 
  PO   0841
 
Ramelteon 8 MG AT BEDTIME AS NEED.. 2130 AC 
 
  PO   
 
Senna/Docusate Sodium 2 TAB AT BEDTIME 2200 AC 
 
  PO   
 
 
 
 
Lines/Diet/Fluids
Lines: picc line right upper arm
 
Assessment/Plan
Assessment:
80-year-old female was admitted for surgical wound infection in the setting of 
left femur Fx S/P IM Nail (17). She has PMH significant  for stage II 
diastolic congestive heart failure on lasix, chronic atrial fibrillation who is 
on metoprolol and pradaxa s/p pacemaker placed in , hypothyroidism, 
depression, COPD not on home oxygen, hypertension, hyperlipidemia, pulmonary 
hypertension with RVP Greater than 50.
 
--pre-op pelvic CT- 
IMPRESSION:
Status post left hip replacement. Large subcutaneous fluid collection in the
soft tissues lateral to the left hip. This could be aspirated percutaneously.
 
--Initial culture from the aspiration of the soft tissue collection by IR was 
positive for alpha strep
Deep tissue from OR Culture from left upper extremity showed growth of staph 
aureus MRSA confirmed.
 
CXR  hazy opacity in the right lower lobe from atelectasis and/or
infiltrate is slightly increased compared to the prior radiographic exams
Awating cxr results for today
 
 
# Left hip-surgical site infection POD#6 I&D
* DRAINS are out. site looks ok no induration, erythema or swelling with 
moderate serosanguinous drainage
* Dressing changes BID. In rehab facility the dressings should still be changed 
BID and the wound should be painted with betadine with each dressing change.  
* Follow up with Dr. Louis about 7-10 days after discharge from [place]
* [facility] for ambulation 
* Monitor for fever, leukocytosis, worsening pain, surgical site drainage
* Deep tissue Culture grew staph aureus MRSA CONFIRMED.Her initial culture from 
the aspiration of the soft tissue collection by IR was positive for alpha strep,
which was not isolated from the OR culture. we are treating her for both 
pathogens as per id recomendation
* Removal of the hardware was not felt to be feasible will need antibiotics for 
4 to 6 weeks
* Cefazolin 2 g IV Q8 day 4 continue till . pt already has a picc 
line
* Baseline ESR 35
* Pradaxa 150mg as per surgery recomendation
* Continue to assess for calf swelling, none now, will obtain u/s to rule out 
dvt if changes
* Plan to D/C to STR
#Chest pain
Pt camplianed of chest pain angina vs panic attack around 11am yesterday when 
she was transferred to chair
* ekg obtained no acute changes as compared with old
* tropnins -ve
* most likely anxiety induced, pt fell well after being transherred to bed
 
#Congestive heart failure
She complained of sob yesterday awaiting cxr results today
#CXR done yesterday showed that hazy opacity in the right lower lobe from 
atelectasis and/or
infiltrate is slightly increased compared to the prior radiographic exams. we 
are diuresing her with lasix 40 mg PO 
Repeat cxr am, awaiting cxr if no change will be discharged today to STR
* Lasix 40 mg daily PO  
* Strict I's and O's
* Daily weights/ I/Os/ Salt restricted diet
 
# Right foot pain
Right foot is inflamed secondary to osteoarthritis versus gout. There is 
erythema over the right great toe and dorsum of the foot, mildly tender on 
palpation.
* X-ray right foot showed no evidence of gouty arthritis. changes were 
consistent with chronic osteo arthritis. Uric acid level normal 5.7
* pt already on pain regimen
 
#Chronic atrial fibrillation s/p pacemaker 
* Pradaxa 150 mg resumed
* Continue metoprolol 50 mg.
# Hypertension
* Continue losartan 25 mg
# Hypothyroidism
* Continue Synthyroid 0.17 mg
#Depression
* Continue paroxetine 40 mg daily
#Hyperlipidemia
* Continue statins
* Cosidering her ABCVD score and multiple cardiac RF, I think patient needs high
dose statins
#Rash
* under the breast and around bikini line 
* nystatin BID 
# Urinary tract infection
* resolved (ampicillin 200mg iv q6  3days  to )
 
 
 
 
 
Full code
reg diet
DVT prophylaxis- Pradaxa 150mg
pain pathway
Problem List:
 1. Atrial fibrillation
 
 2. Infected wound
 
Pain Ratin
Pain Location:
left thigh 
Pain Goal: Pain 4 or less
Pain Plan:
morphine and oxycodone
Tomorrow's Labs & Rationales:
none
**See Addendum**
Subjective
Follow-up For:
Surgical wound infection
UTI
Complaints: no complaints
Subjective:
I saw the patient in the morning she was quite comfortable.  She had no 
particular complaint.no pain at surgical site. she had bowel movement yesterday 
and she does not complain of chest pain palpitations or shortness of breath
 
Review of Systems
Constitutional:
Denies: no symptoms, chills, diaphoresis, fever, malaise, weakness, unexplained 
weight loss. 
EENTM:
Denies: no symptoms, blurred vision, double vision, visual changes, eye pain, 
eye drainage, eye tearing, icterus, ear discharge, ear pain, ear redness, 
hearing changes, nasal congestion, epistaxis, nasal pain, throat pain, throat 
swelling, mouth pain, tooth pain. 
Cardiovascular:
Reports: no symptoms. 
Respiratory:
Reports: no symptoms. 
Gastrointestinal:
Denies: constipation, nausea, vomiting. 
Genitourinary:
Reports: no symptoms. 
Musculoskeletal:
Reports: no symptoms. 
Skin:
Reports: no symptoms. 
Neurological/Psychological:
Reports: no symptoms. 
Hematologic/Endocrine:
Reports: no symptoms. 
Immunologic/Allergic:
Reports: no symptoms. 
 
Objective
Last 24 Hrs of Vital Signs/I&O
 Vital Signs
 
 
Date Time Temp Pulse Resp B/P B/P Pulse O2 O2 Flow FiO2
 
     Mean Ox Delivery Rate 
 
 0622 98.7 59 20 120/60  97 Nasal  
 
       Cannula  
 
 0405 98.2 67 18 108/56  96 Nasal  
 
       Cannula  
 
 0000       Nasal 2.0L 
 
       Cannula  
 
 2330 97.6 61 14 114/58  96 Nasal 2.0L 
 
       Cannula  
 
 1600      93 Nasal 2.0L 
 
       Cannula  
 
 1401 98.9 80 18 142/80  95 Nasal 2.0L 
 
       Cannula  
 
 1327       Nasal 2.5L 
 
       Cannula  
 
 1259       Nasal 2.5L 
 
       Cannula  
 
 
 Intake & Output
 
 
  1600  0800  0000
 
Intake Total  890 
 
Output Total  285 
 
Balance  605 
 
    
 
Intake, IV  650 
 
Intake, Oral  240 
 
Output,  85 
 
Drainage   
 
Output, Urine  200 
 
 
 
 
Physical Exam
General Appearance: Alert, Oriented X3, Cooperative, No Acute Distress
Skin: No Rashes, No Breakdown, No Significant Lesion
HEENT: Mucous Membr. moist/pink
Cardiovascular: Normal S1, Normal S2
Lungs: Clear to Auscultation
Abdomen: Soft
Neurological: Normal Speech
Extremities: she has 2 drains placed in left leg. There is no erythema or 
induration. Dressing is clean and dry
Current Medications:
 Current Medications
 
 
  Sig/Shauna Start time  Last
 
Medication Dose Route Stop Time Status Admin
 
Acetaminophen 1,000 MG .STK-MED ONE  2224 DC 
 
  IV  2225  
 
Acetaminophen 650 MG Q6P PRN  2130 AC 
 
  PO   
 
Acetaminophen 650 MG Q6P PRN  0100 DC 
 
  PO   
 
Acetaminophen 1,000 MG Q6P PRN  0100 DC 
 
  IV   0746
 
Ampicillin 2,000 MG Q6  2359 AC 
 
Sodium Chloride 100 ML IV   0610
 
Ampicillin 2,000 MG Q6H  2130 DC 
 
Sodium Chloride 100 ML IV   
 
Ampicillin 2,000 MG Q6H  2000 DC 
 
Sodium Chloride 100 ML IV   
 
Ampicillin 2,000 MG Q6  1239 DC 
 
Sodium Chloride 100 ML IV   1404
 
Ceftazidime 2,000 MG IQ8  1600 DC 
 
  IV   0746
 
Dexamethasone 4 MG .STK-MED ONE  1840 DC 
 
  IM  1841  
 
Dextrose/Sodium  1,000 ML Q13H  2130 AC 
 
Chloride  IV   0009
 
Fentanyl Citrate 200 MCG .STK-MED ONE  1840 DC 
 
  IM  1841  
 
Furosemide 40 MG DAILY  1000 AC 
 
  PO   
 
Furosemide 40 MG DAILY  1000 DC 
 
  PO   1159
 
Guaifenesin 600 MG Q12  2200 AC 
 
  PO   2328
 
Guaifenesin 600 MG Q12  1143 DC 
 
  PO   1159
 
Heparin Sodium  5,000 UNIT Q8  0600 CAN 
 
(Porcine)  SC   
 
Hydromorphone HCl 2 MG .STK-MED ONE  2140 DC 
 
  IM  2141  
 
Hydromorphone HCl 2 MG .STK-MED ONE  1839 DC 
 
  IM  1840  
 
Ketorolac  30 MG .STK-MED ONE  2225 DC 
 
Tromethamine  IM  2226  
 
Levothyroxine Sodium 0.175 MG DAILY AC  0700 AC 
 
  PO   0610
 
Levothyroxine Sodium 0.175 MG DAILY AC  0700 DC 
 
  PO   0545
 
Losartan Potassium 25 MG DAILY  1000 AC 
 
  PO   
 
Losartan Potassium 25 MG DAILY  1000 DC 
 
  PO   1159
 
Metoprolol Succinate 50 MG DAILY  1000 AC 
 
  PO   
 
Metoprolol Succinate 50 MG DAILY  1000 DC 
 
  PO   1159
 
Morphine Sulfate 2 MG Q4P PRN 2130 AC 
 
  IV   
 
Ondansetron HCl 4 MG .STK-MED ONE  1840 DC 
 
  IM  1841  
 
Paroxetine HCl 40 MG DAILY  1000 AC 
 
  PO   
 
Paroxetine HCl 40 MG DAILY  1000 DC 
 
  PO   1159
 
Polyethylene Glycol 17 GM AT BEDTIME  2200 AC 
 
  PO   2329
 
Polyethylene Glycol 17 GM AT BEDTIME  2200 DC 
 
  PO   
 
Pravastatin Sodium 10 MG DAILY  1000 AC 
 
  PO   
 
Pravastatin Sodium 10 MG DAILY  1000 DC 
 
  PO   1159
 
Ramelteon 8 MG AT BEDTIME AS NEED..  2130 AC 
 
  PO   
 
Ramelteon 8 MG AT BEDTIME AS NEED..  0100 DC 
 
  PO   0130
 
Senna/Docusate Sodium 2 TAB AT BEDTIME  2200 AC 
 
  PO   2329
 
Senna/Docusate Sodium 2 TAB AT BEDTIME  2200 DC 
 
  PO   
 
Sodium Chloride 1,000 ML .[id number]  0800 DC 
 
  IV  2119  1200
 
Vancomycin HCl 1,500 MG Q24H  1600 DC 
 
Sodium Chloride 250 ML IV   1740
 
 
 
 
Last 24 Hrs of Lab/Kar Results
Last 24 Hrs of Labs/Mics:
 Laboratory Tests
 
17 0605:
CBC w Diff Pending, WBC Pending, RBC Pending, Hgb Pending, Hct Pending, MCV 
Pending, MCH Pending, RDW Pending, Plt Count Pending, MPV Pending, PUBS MCHC 
Pending
 Microbiology
2045  URINE ROUT: Urine Culture - RECD
2030  EXTREMITIE: Gross Specimen Examination - RECD
2030  EXTREMITIE: Gram Stain - RECD
 
 
 
Lines/Diet/Fluids
Fluids/Infusions: none
Interiano Still Needed? Yes
Drains Still Needed? Yes
 
Assessment/Plan
Assessment:
80-year-old female was admitted for surgical wound infection in the setting of 
left femur Fx S/P IMN. She has PMH significant  for stage II diastolic 
congestive heart failure on lasix, chronic atrial fibrillation who is on 
metoprolol and pradaxa s/p pacemaker placed in , hypothyroidism, depression,
COPD not on home oxygen, hypertension, hyperlipidemia, pulmonary hypertension 
with RVP Greater than 50.
Pertinent Data 
WBC:12.0 H&H:9.4, 29.4
 
pre-op pelvic CT- 
IMPRESSION:
Status post left hip replacement. Large subcutaneous fluid collection in the
soft tissues lateral to the left hip. This could be aspirated percutaneously.
 
 
 
1. Left hip-surgical site infection POD#1 I&D
 
* Interiano catheter pr surgery 
* OOb for ambulation 
* Monitor for fever, leukocytosis, worsening pain, surgical site drainage
* Continue IV Ampicillin 2000 mg q6h- plan to D/C to complete 6 weeks of IV Abx 
therapy
* resumed Pradaxa 150mg as per surgery recomendation
* Plan to D/C to STR
2. Urinary tract infection: continue Ampicillin 
3. Congestive heart failure
* Lasix 40 mg daily
* Chest x-ray -cardiomegaly, No pulmonary edema
* continue home dose of Lasix
* Strict I's and O's
* Daily weights/ I/Os/ Salt restricted diet 
4.  Chronic atrial fibrillation s/p pacemaker 
*  Pradaxa 150 mg resumed
* Continue metoprolol 50 mg.
5.  Hypertension
* Continue losartan 25 mg
6.  Hypothyroidism
* Continue Synthyroid 0.17 mg
7.  Depression
* Continue paroxetine 40 mg daily
8.  Hyperlipidemia
* Continue statins
* Cosidering her ABCVD score and multiple cardiac RF, I think patient needs high
dose statins 
 
Full code
reg diet
DVT prophylaxis- Pradaxa 150mg
pain pathway
Problem List:
 1. Infected wound
 
 2. Intertrochanteric fracture of left hip
 
 3. Atrial fibrillation
 
Pain Ratin
Pain Location:
left hip
Pain Goal: Pain 4 or less
Pain Plan:
morphine and po tynelol
Tomorrow's Labs & Rationales:
cbc
 
Discharge Plan
Discharge Disposition: STR/NH
Stable for Discharge? Yes
 
Discharge Disposition: STR/NH
Stable for Discharge? Yes
 
 
 
Full code
reg diet
DVT prophylaxis- hold Pradaxa
pain pathway
Problem List:
 1. Infected wound
 
 2. Intertrochanteric fracture of left hip
 
 3. Atrial fibrillation
 
Pain Goal: Pain 4 or less
Pain Plan:
morphine and po tynelol
Tomorrow's Labs & Rationales:
cbc
ANNE-MARIE WHITT,[facility] 17 1103:
Subjective
Follow-up For:
POD # 5Surgical site infection
Complaints: no complaints
Subjective:
I have seen and examined the pt. She was sleeping comfortably in her bed. I woke
her up she was altert and co operative. she has no current complains. She denies
any sob, chest pain and palpitations. She has pain at her surgical site but 
states it is well controlled by pain meds.
 
Review of Systems
Constitutional:
Reports: no symptoms. 
Cardiovascular:
Denies: chest pain, edema, orthopena. 
Respiratory:
Denies: cough, orthopnea, short of breath. 
Gastrointestinal:
Denies: abdominal pain, diarrhea, bowel incontinence, melena. 
Genitourinary:
Reports: no symptoms. 
 
Objective
Last 24 Hrs of Vital Signs/I&O
 Vital Signs
 
 
Date Time Temp Pulse Resp B/P B/P Pulse O2 O2 Flow FiO2
 
     Mean Ox Delivery Rate 
 
 1502 97.8 68 18 120/70  96   
 
 0852  60  140/72     
 
 0851  60  140/72     
 
 0800       Nasal 1.0L 
 
       Cannula  
 
 0612 98.0 60 20 140/72  96 Nasal 1.0L 
 
       Cannula  
 
 2220 98.1 62 20 136/80  98   
 
 
 Intake & Output
 
 
  1600  0800  0000
 
Intake Total 460 390 580
 
Output Total   
 
Balance 460 390 580
 
    
 
Intake,  150 100
 
Intake, Oral 360 240 480
 
Number 0  
 
Bowel   
 
Movements   
 
 
 
 
Physical Exam
General Appearance: Alert, Oriented X3, Cooperative, No Acute Distress
Skin: mild rash under breast both sides and lower abd line
Skin Temp/Moisture Exam: Warm/Dry
Cardiovascular: Normal S1, Normal S2, No Murmurs
Lungs: Clear to Auscultation
Abdomen: Normal Bowel Sounds, Soft, No Tenderness
Neurological: Normal Speech
Extremities: Drainms have been removed. Dressing is clean and dry. there is no 
erythema or induration Bilateral calves soft, no redness, tender to palpation 
bilaterally, pt states it is her baseline pain and not worse., right foot 
erythema over the right great toe and dorsum of the foot, mildly tender on 
palpation
Current Medications:
 Current Medications
 
 
  Sig/Shauna Start time  Last
 
Medication Dose Route Stop Time Status Admin
 
Acetaminophen 650 MG Q6P PRN 0 AC 
 
  PO   2128
 
Cefazolin Sodium 2 GM IQ8  1600 AC 
 
N/A 1 UNIT IV   1643
 
Dabigatran 150 MG BID  1000 AC 
 
  PO   0856
 
Furosemide 20 MG ONCE ONE  1230 DC 
 
  IV  1231  1304
 
Furosemide 40 MG DAILY  1000 AC 
 
  PO   0851
 
Guaifenesin 600 MG Q12 2200 AC 
 
  PO   0852
 
Lactobacillus  1 CAP BID  1229 AC 
 
Acidophilus  PO   0852
 
Levothyroxine Sodium 0.175 MG DAILY AC  0700 AC 
 
  PO   0526
 
Losartan Potassium 25 MG DAILY  1000 AC 
 
  PO   0851
 
Metoprolol Succinate 50 MG DAILY  1000 AC 
 
  PO   0852
 
Morphine Sulfate 2 MG Q4P PRN 2130 AC 
 
  IV   1058
 
Nystatin 1 SARA BID PRN  0915  
 
  TOP   
 
Oxycodone/ 1 TAB Q6P PRN  0830 AC 
 
Acetaminophen  PO   0149
 
Paroxetine HCl 40 MG DAILY  1000 AC 
 
  PO   0851
 
Polyethylene Glycol 17 GM AT BEDTIME 2200 AC 
 
  PO   3
 
Potassium Chloride 20 MEQ BID  220 AC 
 
  PO   0852
 
Pravastatin Sodium 10 MG DAILY  1000 AC 
 
  PO   0852
 
Ramelteon 8 MG AT BEDTIME AS NEED.. 2130 AC 
 
  PO   
 
Senna/Docusate Sodium 2 TAB AT BEDTIME 2200 AC 
 
  PO   
 
 
 
 
Last 24 Hrs of Lab/Kar Results
Last 24 Hrs of Labs/Mics:
 Laboratory Tests
 
17 1032:
Troponin I < 0.01
 
1730:
CBC w Diff NO MAN DIFF REQ, RBC 3.27  L, MCV 97.1, MCH 30.9, RDW 16.8  H, MPV 
7.5, Gran % 74.8, Lymphocytes % 14.3  L, Monocytes % 6.4, Eosinophils % 3.9, 
Basophils % 0.6, Absolute Granulocytes 6.8  H, Absolute Lymphocytes 1.3, 
Absolute Monocytes 0.6, Absolute Eosinophils 0.4, Absolute Basophils 0.1, PUBS 
MCHC 31.9  L
 
 
Lines/Diet/Fluids
Catheters/Tubes: picc line 
 
Assessment/Plan
Assessment:
80-year-old female was admitted for surgical wound infection in the setting of 
left femur Fx S/P IM Nail (17). She has PMH significant  for stage II 
diastolic congestive heart failure on lasix, chronic atrial fibrillation who is 
on metoprolol and pradaxa s/p pacemaker placed in , hypothyroidism, 
depression, COPD not on home oxygen, hypertension, hyperlipidemia, pulmonary 
hypertension with RVP Greater than 50.
 
--Pertinent labs today
WBC: H&H:, 10.1 31.7
 
--pre-op pelvic CT- 
IMPRESSION:
Status post left hip replacement. Large subcutaneous fluid collection in the
soft tissues lateral to the left hip. This could be aspirated percutaneously.
 
--Initial culture from the aspiration of the soft tissue collection by IR was 
positive for alpha strep
Deep tissue from OR Culture from left upper extremity showed growth of staph 
aureus MRSA confirmed.
 
 
# Left hip-surgical site infection POD#5 I&D
* Drain has been removed by PA
* OOb for ambulation 
* Monitor for fever, leukocytosis, worsening pain, surgical site drainage
* Deep tissue Culture grew staph aureus MRSA CONFIRMED.Her initial culture from 
the aspiration of the soft tissue collection by IR was positive for alpha strep,
which was not isolated from the OR culture. we are treating her for both 
pathogens as per id recomendation
* Cefazolin 2 g IV Q8 day 3
* Baseline ESR 35
* Pradaxa 150mg as per surgery recomendation
* Continue to assess for calf swelling, none now, will obtain u/s to rule out 
dvt if changes
* Plan to D/C to STR
#Chest pain
Pt camplianed of chest pain angina vs panic attack around 11am when she was 
transferred to chair
* ekg obtained no acute changes as compared with old
* tropnins -ve
* most likely anxiety induced, pt fell well after being transherred to bed
 
#Congestive heart failure
#CXR done today showed that hazy opacity in the right lower lobe from 
atelectasis and/or
infiltrate is slightly increased compared to the prior radiographic exams. we 
are diuresing her with lasix 40 mg PO 
Repeat cxr am
* Lasix 40 mg daily PO  
* Chest x-ray -cardiomegaly, No pulmonary edema
* continue home dose of Lasix
* Strict I's and O's
* Daily weights/ I/Os/ Salt restricted diet
 
 
# Right foot pain
Right foot is inflamed secondary to osteoarthritis versus gout. There is 
erythema over the right great toe and dorsum of the foot, mildly tender on 
palpation.
* X-ray right foot showed no evidence of gouty arthritis. changes were 
consistent with chronic osteo arthritis
* pt already on pain regimen
* uric acid level normal 5.7
 
#Chronic atrial fibrillation s/p pacemaker 
* Pradaxa 150 mg resumed
* Continue metoprolol 50 mg.
# Hypertension
* Continue losartan 25 mg
# Hypothyroidism
* Continue Synthyroid 0.17 mg
#Depression
* Continue paroxetine 40 mg daily
#Hyperlipidemia
* Continue statins
* Cosidering her ABCVD score and multiple cardiac RF, I think patient needs high
dose statins
#Rash
* under the breast and around bikini line 
* nystatin BID 
# Urinary tract infection
* resolved (ampicillin 200mg iv q6  3days  to )
 
 
 
 
 
Full code
reg diet
DVT prophylaxis- Pradaxa 150mg
pain pathway
Problem List:
 1. CHF (congestive heart failure)
 
 2. Hypothyroidism
 
 3. Atrial fibrillation
 
 4. Infected wound
 
Pain Ratin
Pain Location:
left thigh
Pain Goal: Pain 4 or less
Pain Plan:
percocet morphine
Tomorrow's Labs & Rationales:
none
 
 
PAMELA WHITT,[facility] 17 1147:
Attending MD Review Statement
 
Attending Statement
Attending MD Statement: examined this patient, discuss w/resident/PA/NP, agreed 
w/resident/PA/NP, reviewed EMR data (avail), discussed with nursing, discussed 
with case mgmt, reviewed images, amended to note
Attending Assessment/Plan:
Patient seen and examined, was very anxious this am. She had been on the 
recliner and wanted to go back to bed. She c/o feeling pressure in her chest, 
sob. Per RN she hfeels very anxious as soon as she gets out of bed to chair. 
Once put back on bed, she was fine. 
 
Vital Signs
 
 
Date Time Temp Pulse Resp B/P B/P Pulse O2 O2 Flow FiO2
 
     Mean Ox Delivery Rate 
 
 0852  60  140/72     
 
 0851  60  140/72     
 
 0612 98.0 60 20 140/72  96 Nasal 1.0L 
 
       Cannula  
 
 2220 98.1 62 20 136/80  98   
 
 1600       Nasal 1.0L 
 
       Cannula  
 
 1415 98.3 62 18 124/70  96 Nasal 1.0L 
 
       Cannula  
 
 
on exam;
aox3, looks anxious. 
cv; s1,s2, rrr
resp; clear
abd; soft, nt, bs+
ext; no edema.
 
 Laboratory Tests
 
 
 
 
 1032 0530
 
Chemistry  
 
  Troponin I (< 0.11 ng/ml) < 0.01 
 
Hematology  
 
  CBC w Diff  NO MAN DIFF REQ
 
  WBC (4.8 - 10.8 /CUMM)  9.1
 
  RBC (4.20 - 5.40 /CUMM)  3.27  L
 
  Hgb (12.0 - 16.0 G/DL)  10.1  L
 
  Hct (37 - 47 %)  31.7  L
 
  MCV (81.0 - 99.0 FL)  97.1
 
  MCH (27.0 - 31.0 PG)  30.9
 
  RDW (11.5 - 14.5 %)  16.8  H
 
  Plt Count (130 - 400 /CUMM)  378
 
  MPV (7.4 - 10.4 FL)  7.5
 
  Gran % (42.2 - 75.2 %)  74.8
 
  Lymphocytes % (20.5 - 51.1 %)  14.3  L
 
  Monocytes % (1.7 - 9.3 %)  6.4
 
  Eosinophils % (0 - 5 %)  3.9
 
  Basophils % (0.0 - 2.0 %)  0.6
 
  Absolute Granulocytes (1.4 - 6.5 /CUMM)  6.8  H
 
  Absolute Lymphocytes (1.2 - 3.4 /CUMM)  1.3
 
  Absolute Monocytes (0.10 - 0.60 /CUMM)  0.6
 
  Absolute Eosinophils (0.0 - 0.7 /CUMM)  0.4
 
  Absolute Basophils (0.0 - 0.2 /CUMM)  0.1
 
  PUBS MCHC (33.0 - 37.0 G/DL)  31.9  L
 
 
A/P: 82 y/o F with pmh sig for atrial fibrillation on paradaxa, sinus sick 
syndrome status post pacemaker, hypothyroidism, COPD not on home oxygen, 
pulmonary hypertension, recent left hip arthroplasty for intertrochanteric and 
subtrochanteric femur fracture in May 2017, admitted this time with surgical 
site wound infection, requiring drainage.  Currently patient getting treated 
with cefazolin.  The surgical drains have been discontinued. 
 
Due to this morning's episode of possible anxiety with chest pain and shortness 
of breath, troponin was obtained which was negative.  EKG was also negative.  
Chest x-ray was ordered which is still pending.  If that came out negative, 
patient will be discharged to rehabilitation today.  We just have to find out 
about the duration of antibiotics with infectious disease.
Otherwise continue the rest of the treatment. 
If CXr neg, can be discharged to [facility].
ANNE-MARIE WHITT,[facility] 17 0836:
Subjective
Follow-up For:
POD # 3Surgical site infection.
Complaints: no complaints
Subjective:
I have Seen and examined the patient.  She was resting comfortably in her bed on
oxygen 1L NC.  There were no acute overnight events.  She denies any shortness 
of breath, palpitations and chest pain.
 
 
 
Review of Systems
Constitutional:
Reports: no symptoms. 
EENTM:
Reports: no symptoms. 
Cardiovascular:
Denies: chest pain, palpitations, syncope. 
Respiratory:
Denies: cough, hemoptysis, short of breath, sputum production, stridor. 
Gastrointestinal:
Denies: abdominal pain, constipation, diarrhea, distention, vomiting. 
Genitourinary:
Reports: no symptoms. 
Musculoskeletal:
Reports: no symptoms. 
Skin:
Reports: no symptoms. 
 
Objective
Last 24 Hrs of Vital Signs/I&O
 Vital Signs
 
 
Date Time Temp Pulse Resp B/P B/P Pulse O2 O2 Flow FiO2
 
     Mean Ox Delivery Rate 
 
 0924  74  120/76     
 
 0923  74  120/76     
 
 0800       Nasal 1.0L 
 
       Cannula  
 
 0645 97.8 74 18 120/76  94 Nasal 2.0L 
 
       Cannula  
 
 0000      93 Nasal 1.0L 
 
       Cannula  
 
 2212 97.6 74 20 118/80  93 Nasal 4.0L 
 
       Cannula  
 
 1600       Nasal 1.0L 
 
       Cannula  
 
 1451 98.3 68 20 100/60  95   
 
07/02 1147  64  116/70     
 
/02 1147  64  116/70     
 
 
 Intake & Output
 
 
 / 1600  0800  0000
 
Intake Total  120 480
 
Output Total  1000 790
 
Balance  -880 -310
 
    
 
Intake, Oral  120 480
 
Output,  50 90
 
Drainage   
 
Output, Urine  950 700
 
 
 
 
Physical Exam
General Appearance: Cooperative, No Acute Distress
Skin:  mild rash under breast both sides and lower abd line
Skin Temp/Moisture Exam: Cool/Dry
HEENT: Atraumatic
Neck: Supple
Cardiovascular: Normal S1, Normal S2
Lungs: Clear to Auscultation, Normal Air Movement
Abdomen: Normal Bowel Sounds, Soft, No Tenderness
Neurological: Normal Speech
Extremities: has 1 drain placed in left thigh. Dressing is clean and dry. there 
is no erythema or induration Bilateral calves soft, no redness, tender to 
palpation bilaterally, pt states it is her baseline pain and not worse., right 
foot erythema over the right great toe and dorsum of the foot, mildly tender on 
palpation
Current Medications:
 Current Medications
 
 
  Sig/Shauna Start time  Last
 
Medication Dose Route Stop Time Status Admin
 
Acetaminophen 650 MG .STK-MED ONE  2325 DC 
 
  PO 2326  
 
Acetaminophen 650 MG Q6P PRN 2130 AC 
 
  PO   212
 
Cefazolin Sodium 3,000 MG .STK-MED ONE  1111 DC 
 
  IV  1112  
 
Dabigatran 150 MG BID  1000 AC 
 
  PO   0924
 
Furosemide 40 MG DAILY  1000 AC 
 
  PO   0924
 
Guaifenesin 600 MG Q12 0 AC 
 
  PO   0924
 
Levothyroxine Sodium 0.175 MG DAILY AC  07 AC 
 
  PO   0603
 
Losartan Potassium 25 MG DAILY  1000 AC 
 
  PO   0924
 
Metoprolol Succinate 50 MG DAILY  1000 AC 
 
  PO   0923
 
Metoprolol Succinate 50 MG DAILY  1000 DC 
 
  PO   1147
 
Morphine Sulfate 1 MG ONCE ONE 2330 DC 
 
  IV  233  
 
Morphine Sulfate 2 MG Q4P PRN 2130 AC 
 
  IV   1058
 
Nystatin 1 SARA BID PRN  0915  
 
  TOP   
 
Oxycodone/ 1 TAB Q6P  AC 
 
Acetaminophen  PO   211
 
Paroxetine HCl 40 MG DAILY  1000 AC 
 
  PO   0923
 
Polyethylene Glycol 17 GM AT BEDTIME 2200 AC 
 
  PO   211
 
Pravastatin Sodium 10 MG DAILY  1000 AC 
 
  PO   0924
 
Ramelteon 8 MG AT BEDTIME AS NEED.. 2130 AC 
 
  PO   
 
Senna/Docusate Sodium 2 TAB AT BEDTIME 2200 AC 
 
  PO   211
 
Vancomycin HCl 1,250 MG Q24  1000 AC 
 
Sodium Chloride 250 ML IV   0924
 
 
 
 
Last 24 Hrs of Lab/Kar Results
Last 24 Hrs of Labs/Mics:
 Laboratory Tests
 
17 0600:
Anion Gap 8, Estimated GFR > 60, BUN/Creatinine Ratio 15.0, CBC w Diff NO MAN 
DIFF REQ, RBC 3.07  L, MCV 97.8, MCH 31.7  H, RDW 17.0  H, MPV 7.8, Gran % 65.3,
Lymphocytes % 18.8  L, Monocytes % 8.9, Eosinophils % 6.4  H, Basophils % 0.6, 
Absolute Granulocytes 5.2, Absolute Lymphocytes 1.5, Absolute Monocytes 0.7  H, 
Absolute Eosinophils 0.5, Absolute Basophils 0, PUBS MCHC 32.4  L
 
 
Lines/Diet/Fluids
Catheters/Tubes: drain self suction left thigh
Drains Still Needed? Yes
 
Assessment/Plan
Assessment:
80-year-old female was admitted for surgical wound infection in the setting of 
left femur Fx S/P IM Nail (17). She has PMH significant  for stage II 
diastolic congestive heart failure on lasix, chronic atrial fibrillation who is 
on metoprolol and pradaxa s/p pacemaker placed in , hypothyroidism, 
depression, COPD not on home oxygen, hypertension, hyperlipidemia, pulmonary 
hypertension with RVP Greater than 50.
 
Pertinent labs
WBC:7.9 H&H:9.7, 30.1
 
pre-op pelvic CT- 
IMPRESSION:
Status post left hip replacement. Large subcutaneous fluid collection in the
soft tissues lateral to the left hip. This could be aspirated percutaneously.
 
Initial culture from the aspiration of the soft tissue collection by IR was 
positive for alpha strep
Deep tissue from OR Culture from left upper extremity showed growth of staph 
aureus MRSA confirmed.
 
 
# Left hip-surgical site infection POD#3 I&D
* 1 drain in left upper extremity (self suction). Drainage is serosanginous. 
Consider discontinuing the drain depending upon the output as per ortho reccs.
* OOb for ambulation 
* Monitor for fever, leukocytosis, worsening pain, surgical site drainage
* Deep tissue Culture grew staph aureus MRSA CONFIRMED.Her initial culture from 
the aspiration of the soft tissue collection by IR was positive for alpha strep,
which was not isolated from the OR culture. we are treating her for both 
pathogens as per id recomendation
* Cefazolin 2 g IV Q8 started, vancomycin discontinued
* Baseline ESR ordered
* Pradaxa 150mg as per surgery recomendation
* Continue to assess for calf swelling, none now, will obtain u/s to rule out 
dvt if changes
* Plan to D/C to [facility]
# Right foot pain
Right foot is inflamed secondary to osteoarthritis versus gout. There is 
erythema over the right great toe and dorsum of the foot, mildly tender on 
palpation.
* X-ray right foot ordered
* pt already on pain regimen
* uric acid level ordered
#Congestive heart failure
* Lasix 40 mg daily
* Chest x-ray -cardiomegaly, No pulmonary edema
* continue home dose of Lasix
* Strict I's and O's
* Daily weights/ I/Os/ Salt restricted diet
#Chronic atrial fibrillation s/p pacemaker 
* Pradaxa 150 mg resumed
* Continue metoprolol 50 mg.
# Hypertension
* Continue losartan 25 mg
# Hypothyroidism
* Continue Synthyroid 0.17 mg
#Depression
* Continue paroxetine 40 mg daily
#Hyperlipidemia
* Continue statins
* Cosidering her ABCVD score and multiple cardiac RF, I think patient needs high
dose statins
#Rash
* under the breast and around bikini line 
* nystatin BID 
# Urinary tract infection
* resolved (ampicillin 200mg iv q6  3days  to )
 
 
 
 
 
Full code
reg diet
DVT prophylaxis- Pradaxa 150mg
pain pathway
Problem List:
 1. Atrial fibrillation
 
 2. Intertrochanteric fracture of left hip
 
 3. Infected wound
 
 4. Hypothyroidism
 
Pain Ratin
Pain Location:
left thigh
Pain Goal: Pain 4 or less
Pain Plan:
po tynelol
percocid
morphine
Tomorrow's Labs & Rationales:
cbc
esr
DVT/Prophylaxis: Pradaxa 150mg
 
 
PAMELA WHITT,[facility] 17 1051:
Attending MD Review Statement
 
Attending Statement
Attending MD Statement: examined this patient, discuss w/resident/PA/NP, agreed 
w/resident/PA/NP, reviewed EMR data (avail), discussed with nursing, discussed 
with case mgmt, reviewed images, amended to note
Attending Assessment/Plan:
Patient seen and examined, offers no complaints.  She denies any aches or pains.
 The left hip surgical site as well as drain looks okay.  There is no extension 
of any erythema.  There is only one drain left at the site.
 
Vital Signs
 
 
Date Time Temp Pulse Resp B/P B/P Pulse O2 O2 Flow FiO2
 
     Mean Ox Delivery Rate 
 
 0924  74  120/76     
 
/ 0923  74  120/76     
 
 0645 97.8 74 18 120/76  94 Nasal 2.0L 
 
       Cannula  
 
 0000      93 Nasal 1.0L 
 
       Cannula  
 
 2212 97.6 74 20 118/80  93 Nasal 4.0L 
 
       Cannula  
 
 1600       Nasal 1.0L 
 
       Cannula  
 
 1451 98.3 68 20 100/60  95   
 
 1147  64  116/70     
 
 1147  64  116/70     
 
 
on exam; 
aox3, nad. 
cv; s1, s2, rrr
resp; clear
abd; soft, nt, bs+
ext; no edema. 
skin/ms: the surgical scar john looks better. there is one drain still in. It is 
non tender. 
 
 Laboratory Tests
 
 
 
 
 0600
 
Chemistry 
 
  Sodium (137 - 145 mmol/L) 140
 
  Potassium (3.5 - 5.1 mmol/L) 3.7
 
  Chloride (98 - 107 mmol/L) 98
 
  Carbon Dioxide (22 - 30 mmol/L) 34  H
 
  Anion Gap (5 - 16) 8
 
  BUN (7 - 17 mg/dL) 9
 
  Creatinine (0.5 - 1.0 mg/dL) 0.6
 
  Estimated GFR (>60 ml/min) > 60
 
  BUN/Creatinine Ratio (7 - 25 %) 15.0
 
Hematology 
 
  CBC w Diff NO MAN DIFF REQ
 
  WBC (4.8 - 10.8 /CUMM) 7.9
 
  RBC (4.20 - 5.40 /CUMM) 3.07  L
 
  Hgb (12.0 - 16.0 G/DL) 9.7  L
 
  Hct (37 - 47 %) 30.1  L
 
  MCV (81.0 - 99.0 FL) 97.8
 
  MCH (27.0 - 31.0 PG) 31.7  H
 
  RDW (11.5 - 14.5 %) 17.0  H
 
  Plt Count (130 - 400 /CUMM) 396
 
  MPV (7.4 - 10.4 FL) 7.8
 
  Gran % (42.2 - 75.2 %) 65.3
 
  Lymphocytes % (20.5 - 51.1 %) 18.8  L
 
  Monocytes % (1.7 - 9.3 %) 8.9
 
  Eosinophils % (0 - 5 %) 6.4  H
 
  Basophils % (0.0 - 2.0 %) 0.6
 
  Absolute Granulocytes (1.4 - 6.5 /CUMM) 5.2
 
  Absolute Lymphocytes (1.2 - 3.4 /CUMM) 1.5
 
  Absolute Monocytes (0.10 - 0.60 /CUMM) 0.7  H
 
  Absolute Eosinophils (0.0 - 0.7 /CUMM) 0.5
 
  Absolute Basophils (0.0 - 0.2 /CUMM) 0
 
  PUBS MCHC (33.0 - 37.0 G/DL) 32.4  L
 
 
A/P; 80 y/o F with pmh sig for atrial fibrillation on paradaxa, sinus sick 
syndrome status post pacemaker, hypothyroidism, COPD not on home oxygen, 
pulmonary hypertension, recent left hip arthroplasty for intertrochanteric and 
subtrochanteric femur fracture in May 2017, admitted this time with surgical 
site wound infection, requiring drainage. Now has one drain left, was on 
Ampicillin. Growing stap aureus. Final sensivities pending but abx have bee 
switched to vanco. ID on board.
 
Please check with infectious disease about the final antibiotics as well as the 
duration.  Has a PICC line.  Please check with orthopedic her long distance 
drain needs to stay in.  Continue other current medications.  Patient on pradaxa
with history of atrial fibrillation.
Noted that patient is on morphine for pain control.  Ideally speaking we should 
be changing to oral pain regimen before her discharge for disposition planning. 
We will try to stick with Tylenol.  If pain persists with Tylenol, will try 
giving low dose narcotic prn.  Her allergy list includes codeine but reviewing 
her reconcilled meds, it seems like the patient has taken Percocet.  That will 
need to be clarified.  If there is no true allergy to Percocet than she can 
receive Percocet when necessary for pain control. But if she has a true allergy 
then low-dose tramadol can be used for pain control. 
DVT px; Pradaxa.
Assessment/Plan
Assessment:
80-year-old female with past medical history significant for stage II diastolic 
congestive heart failure on lasix, chronic atrial fibrillation who is on 
metoprolol and pradaxa, bradycardia, SSS status post pacemaker placed in 2014, 
hypothyroidism, depression, COPD not on home oxygen, hypertension, 
hyperlipidemia, pulmonary hypertension with RVP  Greater than 50, last 
echocardiogram in 2016 was brought to the [facility] emergency room with 
chief complaint of fever, lethargy and drainage from left hip surgical site.
 
Vitals on admission-temperature max 99.3, heart rate 70, respiratory 20, blood 
pressure 160 1077, saturating at 94 on room air.
Pertinent labs on admission
Hemoglobin 11.6, hematocrit 36, WBC 11.2 
Sodium 135, potassium 3.3, BUN 7, creatinine 0.6, glucose 100
lactate 1.3
Alkaline phosphatase 222
Urine analysis positive for esterases, WBC.
 
Chest x-ray-cardiomegaly no acute intrathoracic processes
X-ray pelvis, hip, femoral showed Postoperative changes with an oblique fracture
through proximal femoral shaft as noted. 
pelvic CT- 
IMPRESSION:
Status post left hip replacement. Large subcutaneous fluid collection in the
soft tissues lateral to the left hip. This could be aspirated percutaneously.
 
 
 
1. Left hip-surgical site infection
she has Comminuted left proximal femoral intertrochanteric-subtrochanteric 
fracture: s/p repair left comminuted intertrochanteric-subtrochanteric proximal 
femur fracture with long cephalo-medullary nail ( ORIF ) on 5/19/2017.  Now 
presented to emergency department from nursing home with fever 100.7 and mild 
leukocytosis.  Surgical site appears indurated with serous discharge.  CAT scan 
confirms finding of fluid collection soft tissue.
* Admitted to general medicine floor for further management.
* Monitor vitals closely every shift
* Monitor for fever, leukocytosis, worsening pain, surgical site drainage
* IR assisted drainage of soft tissue fluid collection 06/29/2017- 180 ml.
* ID on board.
* OR cultures positive for alpha strep
* Started ceftaz and vancomycin for surgical site infection to cover for MRSA 
and gram-negative rods. leter d/c ed based on cultures
* Switched to ampicillin 2 g IV every 6 hours day 1
* Follow-up final OR cultures
* Follow-up blood culture
* Orthopedics on board, may go to surgical procedure later today- 6/30/2017- I 
AND D
* Pradaxa on hold.
 
 
 
Urinary tract infection
Urine analysis positive for esterases, WBC, bacteria
Probably cystitis 
* urine culture positive for Proteus
* On ampicillin - day1
* Remained afebrile
* Mild leukocytosis
* Offer dysuria, 
* but no frequency, urgency, suprapubic discomfort
 
 
2.  Congestive heart failure
* Stage II diastolic chronic congestive heart failure
* Takes Lasix 40 mg daily at home
* Chest x-ray -cardiomegaly, No pulmonary edema
* continue home dose of Lasix
* Strict I's and O's
* Daily weights.
 
 
 
3.  Chronic atrial fibrillation
* hold Pradaxa 150 mg for anticipated orthopedic surgery- I AND D
* Continue metoprolol 50 mg.
 
 
 
4.  Hypertension
* Continue losartan 25 mg
 
 
 
5.  Hypothyroidism
* Continue Synthyroid 0.17 mg
 
 
 
6.  Depression
* Continue paroxetine 40 mg daily
 
 
7.  Hyperlipidemia
* Continue statins
 
 
 
Full code
reg diet
DVT prophylaxis- hold Pradaxa
pain pathway
MINH VELAZQUEZ 06/30/17 0827:
Subjective
Follow-up For:
Surgical site wound infection
Status post IR drainage of left hip
Urinary tract infection- proteus 
History of atrial fibrillation, Pradaxa on hold now
Complaints: pain scale (0-10)
Subjective:
Patient was seen and examined this morning.  She is alert awake and oriented to 
time place and person.  No acute events noticed overnight.
 
She continues to have 3 out of 10 pain left hip.  
Denies any fever, chills.
Denies any chest pain, palpitations.
continues to have cough with sputum production.
afebrile
 
Vitals normal, afebrile, heart rate 70, respiratory rate 20, blood pressure 130/
80, saturating at 97 on 2l.
 
 
Review of Systems
Constitutional:
Reports: see HPI. 
 
Objective
Last 24 Hrs of Vital Signs/I&O
 Vital Signs
 
 
Date Time Temp Pulse Resp B/P B/P Pulse O2 O2 Flow FiO2
 
     Mean Ox Delivery Rate 
 
06/30 0658 97.8 66 20 140/80  96 Nasal 2.5L 
 
       Cannula  
 
06/30 0000       Nasal 2.0L 
 
       Cannula  
 
06/29 2235 98.5 61 24 160/80  92 Nasal 2.5L 
 
       Cannula  
 
06/29 1747    142/70     
 
06/29 1600       Nasal 2.0L 
 
       Cannula  
 
06/29 1548 99.5 79 24 164/80  99 Nasal 3.0L 
 
       Cannula  
 
 
 Intake & Output
 
 
 06/30 1600 06/30 0800 06/30 0000
 
Intake Total  400 620
 
Output Total  400 
 
Balance  0 620
 
    
 
Intake, IV  400 400
 
Intake, Oral  0 220
 
Number  1 2
 
Bowel   
 
Movements   
 
Output, Urine  400 
 
 
 
 
Physical Exam
General Appearance: Alert, Oriented X3, Cooperative, No Acute Distress
Skin: No Rashes, left hip incision site red , indurated
HEENT: Atraumatic, PERRLA, EOMI, Mucous Membr. moist/pink
Neck: Supple, No JVD
Lymphatic: Cervical nl
Cardiovascular: Normal S1, Normal S2
Lungs: Normal Air Movement
Abdomen: Normal Bowel Sounds, Soft, No Tenderness
Extremities: No Clubbing, No Cyanosis, No Edema
Vascular: Normal Pulses, Pulses Symmetrical
Current Medications:
 Current Medications
 
 
  Sig/Shauna Start time  Last
 
Medication Dose Route Stop Time Status Admin
 
Acetaminophen 650 MG Q6P PRN 06/29 0100 AC 
 
  PO   
 
Acetaminophen 1,000 MG Q6P PRN 06/29 0100 AC 06/30
 
  IV   0746
 
Ampicillin 2,000 MG Q6 06/30 1239 UNVr 
 
Sodium Chloride 100 ML IV   
 
Ceftazidime 2,000 MG IQ8 06/29 1600 DC 06/30
 
  IV   0746
 
Furosemide 40 MG DAILY 06/29 1000 AC 06/30
 
  PO   1159
 
Guaifenesin 600 MG Q12 06/29 1143 AC 06/30
 
  PO   1159
 
Levothyroxine Sodium 0.175 MG DAILY AC 06/29 0700 AC 06/30
 
  PO   0545
 
Lidocaine 1 ML .STK-MED ONE 06/29 1438 DC 
 
  ID 06/29 1439  
 
Losartan Potassium 25 MG DAILY 06/29 1000 AC 06/30
 
  PO   1159
 
Metoprolol Succinate 50 MG DAILY 06/29 1000 AC 06/30
 
  PO   1159
 
Ondansetron HCl 4 MG ONCE ONE 06/29 1630 DC 06/29
 
  IV 06/29 1631  1622
 
Paroxetine HCl 40 MG DAILY 06/29 1000 AC 06/30
 
  PO   1159
 
Patient Medication  1 ED .STK-MED ONE 06/29 1411 DC 
 
Teaching  ED 06/29 1412  
 
Polyethylene Glycol 17 GM AT BEDTIME 06/29 2200 AC 
 
  PO   
 
Pravastatin Sodium 10 MG DAILY 06/29 1000 AC 06/30
 
  PO   1159
 
Ramelteon 8 MG AT BEDTIME AS NEED.. 06/29 0100 AC 06/29
 
  PO   0130
 
Senna/Docusate Sodium 2 TAB AT BEDTIME 06/29 2200 AC 
 
  PO   
 
Sodium Chloride 1,000 ML .[id number] 06/30 0800 AC 06/30
 
  IV 06/30 2119  1200
 
Sodium Chloride 1,000 ML .Q20H 06/29 0100 DC 06/29
 
  IV 06/29 2059  0125
 
Vancomycin HCl 1,500 MG Q24H 06/29 1600 DC 06/29
 
Sodium Chloride 250 ML IV   1740
 
 
 
 
Last 24 Hrs of Lab/Kar Results
Last 24 Hrs of Labs/Mics:
 Laboratory Tests
 
06/30/17 0645:
Anion Gap 10, Estimated GFR > 60, BUN/Creatinine Ratio 18.0, CBC w Diff NO MAN 
DIFF REQ, RBC 3.37  L, MCV 97.7, MCH 31.3  H, RDW 17.2  H, MPV 8.1, Gran % 83.8 
H, Lymphocytes % 7.9  L, Monocytes % 7.2, Eosinophils % 0.8, Basophils % 0.3, 
Absolute Granulocytes 9.2  H, Absolute Lymphocytes 0.9  L, Absolute Monocytes 
0.8  H, Absolute Eosinophils 0.1, Absolute Basophils 0, PUBS MCHC 32.1  L
 Microbiology
06/29 2345  LOWER RESP: Respiratory Culture - CAN
                Cancelled: NUMBER OF SQUAMOUS CELLS INDICATES POOR QUALITY 
SPECIMEN
06/29 2345  LOWER RESP: Gram Stain - CAN
                Cancelled: NUMBER OF SQUAMOUS CELLS INDICATES POOR QUALITY 
SPECIMEN
06/29 1410  BODY FLUID: Body Fluid Culture - RES
                GRAM POSITIVE COCCI
06/29 1410  BODY FLUID: Gram Stain - RES
 
 
 
Assessment/Plan
Assessment:
80-year-old female with past medical history significant for stage II diastolic 
congestive heart failure on lasix, chronic atrial fibrillation who is on 
metoprolol and pradaxa, bradycardia, SSS status post pacemaker placed in 2014, 
hypothyroidism, depression, COPD not on home oxygen, hypertension, 
hyperlipidemia, pulmonary hypertension with RVP  Greater than 50, last 
echocardiogram in 2016 was brought to the [facility] emergency room with 
chief complaint of fever, lethargy and drainage from left hip surgical site.
 
Vitals on admission-temperature max 99.3, heart rate 70, respiratory 20, blood 
pressure 160 1077, saturating at 94 on room air.
Pertinent labs on admission
Hemoglobin 11.6, hematocrit 36, WBC 11.2 
Sodium 135, potassium 3.3, BUN 7, creatinine 0.6, glucose 100
lactate 1.3
Alkaline phosphatase 222
Urine analysis positive for esterases, WBC.
 
Chest x-ray-cardiomegaly no acute intrathoracic processes
X-ray pelvis, hip, femoral showed Postoperative changes with an oblique fracture
through proximal femoral shaft as noted. 
pelvic CT- 
IMPRESSION:
Status post left hip replacement. Large subcutaneous fluid collection in the
soft tissues lateral to the left hip. This could be aspirated percutaneously.
 
 
 
1. Left hip-surgical site infection
she has Comminuted left proximal femoral intertrochanteric-subtrochanteric 
fracture: s/p repair left comminuted intertrochanteric-subtrochanteric proximal 
femur fracture with long cephalo-medullary nail ( ORIF ) on 5/19/2017.  Now 
presented to emergency department from nursing home with fever 100.7 and mild 
leukocytosis.  Surgical site appears indurated with serous discharge.  CAT scan 
confirms finding of fluid collection soft tissue.
* Admitted to general medicine floor for further management.
* Monitor vitals closely every shift
* Monitor for fever, leukocytosis, worsening pain, surgical site drainage
* IR assisted drainage of soft tissue fluid collection 06/29/2017- 180 ml.
* ID on board.
* OR cultures positive for alpha strep
* Started ceftaz and vancomycin for surgical site infection to cover for MRSA 
and gram-negative rods. leter d/c ed based on cultures
* Switched to ampicillin 2 g IV every 6 hours day 1
* Follow-up final OR cultures
* Follow-up blood culture
* Orthopedics on board, may go to surgical procedure later today- 6/30/2017- I 
AND D
* Pradaxa on hold.
 
 
 
Urinary tract infection
Urine analysis positive for esterases, WBC, bacteria
Probably cystitis 
* urine culture positive for Proteus
* On ampicillin - day1
* Remained afebrile
* Mild leukocytosis
* Offer dysuria, 
* but no frequency, urgency, suprapubic discomfort
 
 
2.  Congestive heart failure
* Stage II diastolic chronic congestive heart failure
* Takes Lasix 40 mg daily at home
* Chest x-ray -cardiomegaly, No pulmonary edema
* continue home dose of Lasix
* Strict I's and O's
* Daily weights.
 
 
 
3.  Chronic atrial fibrillation
* hold Pradaxa 150 mg for anticipated orthopedic surgery- I AND D
* Continue metoprolol 50 mg.
 
 
 
4.  Hypertension
* Continue losartan 25 mg
 
 
 
5.  Hypothyroidism
* Continue Synthyroid 0.17 mg
 
 
 
6.  Depression
* Continue paroxetine 40 mg daily
 
 
7.  Hyperlipidemia
* Continue statins
 
 
 
Full code
reg diet
DVT prophylaxis- hold Pradaxa
pain pathway
Problem List:
 1. Infected wound
 
Pain Rating: 3
Pain Location:
left hip
Pain Goal: Remain pain free
Pain Plan:
tylinol
morphine
Tomorrow's Labs & Rationales:
cbc in the setting of infection
 
 
ALLAN ROTHMAN MD 06/30/17 1132:
Attending MD Review Statement
 
Attending Statement
Attending MD Statement: examined this patient, discuss w/resident/PA/NP, agreed 
w/resident/PA/NP, reviewed EMR data (avail), discussed with case mgmt, amended 
to note
Attending Assessment/Plan:
The patient was seen and discussed with house staff. Agree with plan of care. To
go to OR today with ortho for debridement/drainage. Continue antibiotics - await
ID input based on cultures. Patient also has UTI.
MINH VELAZQUEZ 17 0732:
Subjective
Follow-up For:
Surgical site wound infection
Status post drainage of left hip
Urinary tract infection
History of atrial fibrillation, Pradaxa on hold now
Complaints: pain scale (0-10)
Subjective:
Patient was seen and examined this morning.  She is alert awake and oriented to 
time place and person.  No acute events noticed overnight.
 
She continues to have 6 out of 10 pain left hip.  Associated with serous 
drainage.
Denies any fever, chills.
Denies any chest pain, palpitations.
However continues to have cough with sputum production.
 
Vitals normal, afebrile, heart rate 70, respiratory rate 20, blood pressure 130/
80, saturating at 97 on 2l.
 
Review of Systems
Constitutional:
Reports: see HPI. 
 
Objective
Last 24 Hrs of Vital Signs/I&O
 Vital Signs
 
 
Date Time Temp Pulse Resp B/P B/P Pulse O2 O2 Flow FiO2
 
     Mean Ox Delivery Rate 
 
 1116  70  138/72     
 
 1116  70  138/72     
 
 1101       Nasal 2.0L 
 
       Cannula  
 
 0652 97.6 72 20 130/70  95 Nasal 2.0L 
 
       Cannula  
 
 0000      95 Nasal 2.0L 
 
       Cannula  
 
 2111 98.0 74 20 158/64  91 Room Air  
 
 1846 98.9 78 20 146/73  96 Room Air  
 
 1748 99.1 82 18 165/80  95 Room Air  
 
 
 Intake & Output
 
 
  1600  0800  0000
 
Intake Total  500 
 
Output Total  1000 75
 
Balance  -500 -75
 
    
 
Intake, IV  300 
 
Intake, Oral  200 
 
Number  2 
 
Bowel   
 
Movements   
 
Output, Urine  1000 75
 
Patient   106.594 kg
 
Weight   
 
Weight   Reported by Patient
 
Measurement   
 
Method   
 
 
 
 
Physical Exam
General Appearance: Alert, Oriented X3, Cooperative, No Acute Distress
Skin: No Rashes, No Breakdown
HEENT: Atraumatic, PERRLA, EOMI, Mucous Membr. moist/pink
Neck: Supple, No JVD, No thryomegaly
Lymphatic: Cervical nl
Cardiovascular: Normal S1, Normal S2
Lungs: Normal Air Movement
Abdomen: Normal Bowel Sounds, Soft, No Tenderness
Extremities: No Clubbing, No Cyanosis, No Edema
Vascular: Pulses Symmetrical
Current Medications:
 Current Medications
 
 
  Sig/Shauna Start time  Last
 
Medication Dose Route Stop Time Status Admin
 
Acetaminophen 650 MG Q6P PRN  0100 AC 
 
  PO   
 
Acetaminophen 1,000 MG Q6P PRN  0100 AC 
 
  IV   0130
 
Albuterol Sulfate 3 ML ONCE ONE  1215 DC 
 
  INH  1216  1212
 
Ceftazidime 2,000 MG IQ8  1600 AC 
 
  IV   
 
Furosemide 40 MG DAILY  1000 AC 
 
  PO   1116
 
Guaifenesin 600 MG Q12  1143 AC 
 
  PO   
 
Levothyroxine Sodium 0.175 MG DAILY AC  0700 AC 
 
  PO   0644
 
Lidocaine 1 ML .STK-MED ONE  1438 DC 
 
  ID  1439  
 
Losartan Potassium 25 MG DAILY  1000 AC 
 
  PO   1116
 
Metoprolol Succinate 50 MG DAILY  1000 AC 
 
  PO   1116
 
Morphine Sulfate 1 MG ONCE ONE 2015 DC 
 
  IV 
 
Morphine Sulfate 0 .STK-MED ONE 2014 DC 
 
  .ROUTE   
 
Paroxetine HCl 40 MG DAILY  1000 AC 
 
  PO   1116
 
Patient Medication  1 ED .STK-MED ONE  1411 DC 
 
Teaching  ED  1412  
 
Polyethylene Glycol 17 GM AT BEDTIME  2200 AC 
 
  PO   
 
Potassium Chloride 40 MEQ ONCE ONE  0215 DC 
 
  PO  0216  0230
 
Pravastatin Sodium 10 MG DAILY  1000 AC 
 
  PO   1115
 
Ramelteon 8 MG AT BEDTIME AS NEED..  0100 AC 
 
  PO   0130
 
Senna/Docusate Sodium 2 TAB AT BEDTIME  2200 AC 
 
  PO   
 
Sodium Chloride 1,000 ML .Q20H  0100 AC 
 
  IV 
 
Vancomycin HCl 1,500 MG Q24H  1600 AC 
 
Sodium Chloride 250 ML IV   
 
 
 
 
Last 24 Hrs of Lab/Kar Results
Last 24 Hrs of Labs/Mics:
 Laboratory Tests
 
17 0700:
Anion Gap 10, Estimated GFR > 60, BUN/Creatinine Ratio 14.0, PT 15.4  H, INR 
1.47  H, CBC w Diff NO MAN DIFF REQ, RBC 3.29  L, MCV 96.7, MCH 31.4  H, RDW 
16.4  H, MPV 8.3, Gran % 77.6  H, Lymphocytes % 10.7  L, Monocytes % 9.6  H, 
Eosinophils % 1.8, Basophils % 0.3, Absolute Granulocytes 8.2  H, Absolute 
Lymphocytes 1.1  L, Absolute Monocytes 1.0  H, Absolute Eosinophils 0.2, 
Absolute Basophils 0, PUBS MCHC 32.5  L
 
17 1807:
Lactic Acid Cancelled
 
17 1530:
Urine Color YEL, Urine Clarity HAZY  H, Urine pH 6.5, Ur Specific Gravity 1.010,
Urine Protein TRACE  H, Urine Ketones NEG, Urine Nitrite NEG, Urine Bilirubin 
NEG, Urine Urobilinogen 2.0  H, Ur Leukocyte Esterase MOD  H, Ur Microscopic 
SEDIMENT EXAMINED, Urine RBC 10-15  H, Urine WBC 15-25  H, Ur Epithelial Cells 
OCCAS, Urine Bacteria PACKD  H, Urine Mucus RARE, Urine Hemoglobin MOD  H, Urine
Glucose NEG
 Microbiology
 1523  LOWER RESP: Respiratory Culture - ORD
 1523  LOWER RESP: Gram Stain - ORD
 1410  BODY FLUID: Body Fluid Culture - RECD
 1410  BODY FLUID: Gram Stain - RECD
 1145  LOWER RESP: Respiratory Culture - CAN
                Cancelled: NUMBER OF SQUAMOUS CELLS INDICATES POOR QUALITY 
SPECIMEN
 1145  LOWER RESP: Gram Stain - CAN
                Cancelled: NUMBER OF SQUAMOUS CELLS INDICATES POOR QUALITY 
SPECIMEN
 1011  BODY FLUID: Body Fluid Culture - CAN
                Cancelled: DUPL;ICATE ORDER
 1011  BODY FLUID: Gram Stain - CAN
                Cancelled: DUPL;ICATE ORDER
 1740  BLOOD: Blood Culture - RES
 1530  URINE ROUT: Urine Culture - RES
                GRAM NEGATIVE RODS
 
 
 
Assessment/Plan
Assessment:
80-year-old female with past medical history significant for stage II diastolic 
congestive heart failure on lasix, chronic atrial fibrillation who is on 
metoprolol and pradaxa, bradycardia, SSS status post pacemaker placed in , 
hypothyroidism, depression, COPD not on home oxygen, hypertension, 
hyperlipidemia, pulmonary hypertension with RVP  Greater than 50, last 
echocardiogram in 2016 was brought to the [facility] emergency room with 
chief complaint of fever, lethargy and drainage from left hip surgical site.
 
Vitals on admission-temperature max 99.3, heart rate 70, respiratory 20, blood 
pressure 160 1077, saturating at 94 on room air.
Pertinent labs on admission
Hemoglobin 11.6, hematocrit 36, WBC 11.2 
Sodium 135, potassium 3.3, BUN 7, creatinine 0.6, glucose 100
lactate 1.3
Alkaline phosphatase 222
Urine analysis positive for esterases, WBC.
 
Chest x-ray-cardiomegaly no acute intrathoracic processes
X-ray pelvis, hip, femoral showed Postoperative changes with an oblique fracture
through proximal femoral shaft as noted. 
pelvic CT- 
IMPRESSION:
Status post left hip replacement. Large subcutaneous fluid collection in the
soft tissues lateral to the left hip. This could be aspirated percutaneously.
 
 
 
1. Left hip-surgical site infection
she has Comminuted left proximal femoral intertrochanteric-subtrochanteric 
fracture: s/p repair left comminuted intertrochanteric-subtrochanteric proximal 
femur fracture with long cephalo-medullary nail on 2017.  Now presented to 
emergency department from nursing home with fever 100.7 and mild leukocytosis.  
Surgical site appears indurated with serous discharge.  CAT scan confirms 
finding of fluid collection soft tissue.
* Admitted to general medicine floor for further management.
* Monitor vitals closely every shift
* Monitor for fever, leukocytosis, worsening pain, surgical site drainage
* IR assisted drainage of soft tissue fluid collection 2017- 180 ml.
* ID on board
* Started ceftaz and vancomycin for surgical site infection to cover for MRSA 
and gram-negative rods-day 1
* Follow-up OR cultures
* Follow-up blood culture and urine cultures
* Orthopedics on board, may go to surgical procedure tomorrow.
* Pradaxa on hold.
 
 
 
Urinary tract infection
Urine analysis positive for esterases, WBC, bacteria
Probably cystitis
Patient is on ceftaz and Vanco now
Will follow-up urine cultures and change antibiotics according.
 
 
 
2.  Congestive heart failure
* Stage II diastolic chronic congestive heart failure
* Takes Lasix 40 mg daily at home
* Chest x-ray -cardiomegaly, No pulmonary edema
* Resume her home dose of Lasix 40 mg from tomorrow.
* Strict I's and O's
* Daily weights.
 
 
 
3.  Chronic atrial fibrillation
* hold Pradaxa 150 mg for anticipated orthopedic surgery
* Continue metoprolol 50 mg.
 
 
 
4.  Hypertension
* Continue losartan 25 mg
 
 
 
5.  Hypothyroidism
* Continue Synthyroid 0.17 mg
 
 
 
6.  Depression
* Continue paroxetine 40 mg daily
 
 
7.  Hyperlipidemia
* Continue statins
 
 
 
Full code
reg diet
DVT prophylaxis- hold Pradaxa
pain pathway
Problem List:
 1. Infected wound
 
Pain Ratin
Pain Location:
left hip
Pain Goal: Remain pain free
Pain Plan:
tylinol
morphine
Tomorrow's Labs & Rationales:
cbc in the setting of infection
bep 
 
 
ALLAN ROTHMAN MD 17 2212:
Attending MD Review Statement
 
Attending Statement
Attending MD Statement: examined this patient, discuss w/resident/PA/NP, agreed 
w/resident/PA/NP, reviewed EMR data (avail), discussed with nursing, discussed 
with case mgmt, reviewed images, amended to note
Attending Assessment/Plan:
The patient was seen and discussed with house staff. S/P drainage of infection 
by IR. Appreciate ID input. Agree with plan of care as outlined.
Subjective
Follow-up For:
POD # 4 Surgical site infection.
Complaints: no complaints
Subjective:
I have Seen and examined the patient.  She was resting comfortably in her bed on
oxygen 1L NC.  There were no acute overnight events.  She denies any shortness 
of breath, palpitations and chest palpitations.She compalins of loose stool
 
Review of Systems
Constitutional:
Reports: no symptoms. 
EENTM:
Reports: no symptoms. 
Cardiovascular:
Denies: chest pain, palpitations. 
Respiratory:
Reports: no symptoms.  Denies: cough, short of breath. 
Gastrointestinal:
Reports: diarrhea. 
Genitourinary:
Reports: no symptoms. 
 
Objective
Last 24 Hrs of Vital Signs/I&O
 Vital Signs
 
 
Date Time Temp Pulse Resp B/P B/P Pulse O2 O2 Flow FiO2
 
     Mean Ox Delivery Rate 
 
 1415 98.3 62 18 124/70  96 Nasal 1.0L 
 
       Cannula  
 
 0905  62  108/70     
 
 0905  62  108/70     
 
 0800       Nasal 1.0L 
 
       Cannula  
 
 0647 97.9 61 20 108/78  94 Nasal 1.0L 
 
       Cannula  
 
 0000      91 Nasal 1.0L 
 
       Cannula  
 
 2206 97.7 55 18 110/78  94 Nasal 1.0L 
 
       Cannula  
 
 
 Intake & Output
 
 
  1600 /04 0800 / 0000
 
Intake Total  340 390
 
Output Total  80 260
 
Balance  260 130
 
    
 
Intake, IV  100 150
 
Intake, Oral  240 240
 
Number 3  2
 
Bowel   
 
Movements   
 
Output,  80 60
 
Drainage   
 
Output, Urine   200
 
 
 
 
Physical Exam
General Appearance: Alert, Oriented X3, Cooperative, No Acute Distress
Skin: mild rash under breast both sides and lower abd line
HEENT: Atraumatic
Cardiovascular: Normal S1, Normal S2, No Murmurs
Lungs: Clear to Auscultation, Normal Air Movement
Abdomen: Normal Bowel Sounds, Soft, No Tenderness
Neurological: Normal Speech
Extremities: drain has been removed from left thigh. Dressing is clean and dry. 
there is no erythema or induration Bilateral calves soft, no redness, tender to 
palpation bilaterally, pt states it is her baseline pain and not worse., right 
foot erythema over the right great toe and dorsum of the foot, mildly tender on 
palpation
Current Medications:
 Current Medications
 
 
  Sig/Shauna Start time  Last
 
Medication Dose Route Stop Time Status Admin
 
Acetaminophen 650 MG Q6P PRN 2130 AC 
 
  PO   8
 
Cefazolin Sodium 2 GM IQ8  1600 AC 
 
N/A 1 UNIT IV   0904
 
Dabigatran 150 MG BID  1000 AC 
 
  PO   09
 
Furosemide 40 MG DAILY  1000 AC 
 
  PO   09
 
Guaifenesin 600 MG Q12  2200 AC 
 
  PO   09
 
Lactobacillus  1 CAP BID  1229 AC 
 
Acidophilus  PO   
 
Levothyroxine Sodium 0.175 MG DAILY AC  0700 AC 
 
  PO   0542
 
Losartan Potassium 25 MG DAILY  1000 AC 
 
  PO   0905
 
Metoprolol Succinate 50 MG DAILY  1000 AC 
 
  PO   09
 
Morphine Sulfate 2 MG Q4P PRN  2130 AC 
 
  IV   1058
 
Nystatin 1 SARA BID PRN  0915 AC 
 
  TOP   
 
Oxycodone/ 1 TAB Q6P PRN  0830 AC 
 
Acetaminophen  PO   0128
 
Paroxetine HCl 40 MG DAILY  1000 AC 
 
  PO   09
 
Polyethylene Glycol 17 GM AT BEDTIME 2200 AC 
 
  PO   
 
Potassium Chloride 20 MEQ BID  220 AC 
 
  PO   09
 
Pravastatin Sodium 10 MG DAILY  1000 AC 
 
  PO   09
 
Ramelteon 8 MG AT BEDTIME AS NEED.. 2130 AC 
 
  PO   
 
Senna/Docusate Sodium 2 TAB AT BEDTIME 2200 AC 
 
  PO   
 
 
 
 
Last 24 Hrs of Lab/Kar Results
Last 24 Hrs of Labs/Mics:
 Laboratory Tests
 
17 0540:
CBC w Diff NO MAN DIFF REQ, RBC 3.27  L, MCV 97.6, MCH 31.3  H, RDW 17.1  H, MPV
7.8, Gran % 66.7, Lymphocytes % 18.9  L, Monocytes % 8.9, Eosinophils % 5.1  H, 
Basophils % 0.4, Absolute Granulocytes 6.1, Absolute Lymphocytes 1.7, Absolute 
Monocytes 0.8  H, Absolute Eosinophils 0.5, Absolute Basophils 0, PUBS MCHC 32.0
 L, ESR Westergren 35  H
 Microbiology
 1229  STOOL: Clostridium difficile Toxin A & B - ORD
 
 
 
Assessment/Plan
Assessment:
80-year-old female was admitted for surgical wound infection in the setting of 
left femur Fx S/P IM Nail (17). She has PMH significant  for stage II 
diastolic congestive heart failure on lasix, chronic atrial fibrillation who is 
on metoprolol and pradaxa s/p pacemaker placed in , hypothyroidism, 
depression, COPD not on home oxygen, hypertension, hyperlipidemia, pulmonary 
hypertension with RVP Greater than 50.
 
--Pertinent labs today
WBC:9.1 H&H:10.2, 30.9
 
--pre-op pelvic CT- 
IMPRESSION:
Status post left hip replacement. Large subcutaneous fluid collection in the
soft tissues lateral to the left hip. This could be aspirated percutaneously.
 
--Initial culture from the aspiration of the soft tissue collection by IR was 
positive for alpha strep
Deep tissue from OR Culture from left upper extremity showed growth of staph 
aureus MRSA confirmed.
 
 
# Left hip-surgical site infection POD#4 I&D
* Drain has been removed by PA
* OOb for ambulation 
* Monitor for fever, leukocytosis, worsening pain, surgical site drainage
* Deep tissue Culture grew staph aureus MRSA CONFIRMED.Her initial culture from 
the aspiration of the soft tissue collection by IR was positive for alpha strep,
which was not isolated from the OR culture. we are treating her for both 
pathogens as per id recomendation
* Cefazolin 2 g IV Q8 day 2, vancomycin discontinued
* Baseline ESR 35
* Pradaxa 150mg as per surgery recomendation
* Continue to assess for calf swelling, none now, will obtain u/s to rule out 
dvt if changes
* Plan to D/C to STR
# Right foot pain
Right foot is inflamed secondary to osteoarthritis versus gout. There is 
erythema over the right great toe and dorsum of the foot, mildly tender on 
palpation.
* X-ray right foot showed no evidence of gouty arthritis. changes were 
consistent with chronic osteo arthritis
* pt already on pain regimen
* uric acid level normal 5.7
#Congestive heart failure
* Lasix 40 mg daily
* Chest x-ray -cardiomegaly, No pulmonary edema
* continue home dose of Lasix
* Strict I's and O's
* Daily weights/ I/Os/ Salt restricted diet
#Chronic atrial fibrillation s/p pacemaker 
* Pradaxa 150 mg resumed
* Continue metoprolol 50 mg.
# Hypertension
* Continue losartan 25 mg
# Hypothyroidism
* Continue Synthyroid 0.17 mg
#Depression
* Continue paroxetine 40 mg daily
#Hyperlipidemia
* Continue statins
* Cosidering her ABCVD score and multiple cardiac RF, I think patient needs high
dose statins
#Rash
* under the breast and around bikini line 
* nystatin BID 
# Urinary tract infection
* resolved (ampicillin 200mg iv q6  3days  to )
 
 
 
 
 
Full code
reg diet
DVT prophylaxis- Pradaxa 150mg
pain pathway
Problem List:
 1. Arthritis
 
 2. DVT prophylaxis
 
 3. Cellulitis
 
Pain Ratin
Pain Location:
left thigh
Pain Goal: Pain 4 or less
Pain Plan:
po tynelol
percocid
morphine
Tomorrow's Labs & Rationales:
CBC
DVT/Prophylaxis: PRADAXA 150MG
Subjective
Follow-up For:
Surgical wound infection POD#2
UTI
Complaints: no complaints
Subjective:
I have seen and examined the patient in the morning.  She was lying comfortably 
in her bed.  She had no overnight events.  She complains of occasional pain pain
which is controlled by medication.  He had a bowel movement yesterday.  She 
denies any shortness of breath, chest pain and palpitations.
 
Review of Systems
Constitutional:
Reports: no symptoms.  Denies: chills, diaphoresis, fever. 
EENTM:
Reports: no symptoms. 
Cardiovascular:
Reports: no symptoms.  Denies: chest pain, palpitations. 
Respiratory:
Reports: no symptoms.  Denies: cough, short of breath, sputum production. 
Gastrointestinal:
Reports: no symptoms.  Denies: abdominal pain, constipation, distention, bowel 
incontinence, nausea, vomiting. 
Genitourinary:
Reports: no symptoms. 
Musculoskeletal:
Reports: no symptoms. 
Skin:
Reports: no symptoms. 
Neurological/Psychological:
Reports: no symptoms. 
 
Objective
Last 24 Hrs of Vital Signs/I&O
 Vital Signs
 
 
Date Time Temp Pulse Resp B/P B/P Pulse O2 O2 Flow FiO2
 
     Mean Ox Delivery Rate 
 
 0630 97.9 57 18 108/60  91 Room Air  
 
/ 0000       Nasal 1.0L 
 
       Cannula  
 
 2155 97.6 64 20 102/60  97 Nasal  
 
       Cannula  
 
 1600       Nasal 1.0L 
 
       Cannula  
 
 1450 97.7 72 22 100/60  98   
 
07/01 1101  62  122/62     
 
07/01 1100  62  122/62     
 
 
 Intake & Output
 
 
 07/ 1600 07/02 0800 07/02 0000
 
Intake Total  540 220
 
Output Total  235 410
 
Balance  305 -190
 
    
 
Intake, IV  300 20
 
Intake, Oral  240 200
 
Output,  35 60
 
Drainage   
 
Output, Urine  200 350
 
 
 
 
Physical Exam
General Appearance: Alert, Oriented X3, Cooperative, No Acute Distress
Skin: mild redness under breast both sides and lower abd line
Skin Temp/Moisture Exam: Warm/Dry
HEENT: Mucous Membr. moist/pink
Neck: Supple
Cardiovascular: Normal S1, Normal S2
Lungs: Clear to Auscultation
Abdomen: Normal Bowel Sounds, Soft, No Tenderness
Neurological: Normal Speech
Extremities: she has 2 drains placed in left leg. Dressing is clean and dry. 
Bilateral calves soft, no redness, tender to palpation bilaterally, pt states it
is her baseline pain and not worse.
Breasts: Breast appear nl
Current Medications:
 Current Medications
 
 
  Sig/Shauna Start time  Last
 
Medication Dose Route Stop Time Status Admin
 
Acetaminophen 650 MG .STK-MED ONE 2127 DC 
 
  PO 2128  
 
Acetaminophen 650 MG .STK-MED ONE  1311 DC 
 
  PO  1312  
 
Acetaminophen 650 MG Q6P PRN  2130 AC 
 
  PO   2128
 
Ampicillin 2,000 MG Q6  2359 AC 
 
Sodium Chloride 100 ML IV   06
 
Dabigatran 150 MG BID  1000 AC 
 
  PO   210
 
Dextrose/Sodium  1,000 ML Q13H  2130 DC 
 
Chloride  IV   0009
 
Furosemide 40 MG DAILY  1000 AC 
 
  PO   1101
 
Guaifenesin 600 MG Q12 2200 AC 
 
  PO   210
 
Levothyroxine Sodium 0.175 MG DAILY AC  07 AC 
 
  PO   0602
 
Losartan Potassium 25 MG DAILY  1000 AC 
 
  PO   1101
 
Metoprolol Succinate 50 MG DAILY  1000 AC 
 
  PO   1100
 
Morphine Sulfate 2 MG ONCE ONE  2300 DC 
 
  IV  2301  2339
 
Morphine Sulfate 2 MG Q4P PRN  2130 AC 
 
  IV   0701
 
Oxycodone/ 1 TAB Q6P PRN  0830 AC 
 
Acetaminophen  PO   
 
Oxycodone/ 1 TAB ONCE ONE  1430 DC 
 
Acetaminophen  PO  1431  1435
 
Paroxetine HCl 40 MG DAILY  1000 AC 
 
  PO   1101
 
Polyethylene Glycol 17 GM AT BEDTIME 2200 AC 
 
  PO   210
 
Potassium Chloride 20 MEQ BID  1536 DC 
 
  PO  220  210
 
Pravastatin Sodium 10 MG DAILY  1000 AC 
 
  PO   1100
 
Ramelteon 8 MG AT BEDTIME AS NEED.. 2130 AC 
 
  PO   
 
Senna/Docusate Sodium 2 TAB AT BEDTIME 2200 AC 
 
  PO   210
 
 
 
 
Last 24 Hrs of Lab/Kar Results
Last 24 Hrs of Labs/Mics:
 Laboratory Tests
 
17 06:
Anion Gap 9, Estimated GFR > 60, BUN/Creatinine Ratio 18.3, CBC w Diff NO MAN 
DIFF REQ, RBC 3.31  L, MCV 98.7, MCH 31.2  H, RDW 17.1  H, MPV 8.1, Gran % 67.5,
Lymphocytes % 16.9  L, Monocytes % 9.2, Eosinophils % 5.8  H, Basophils % 0.6, 
Absolute Granulocytes 6.7  H, Absolute Lymphocytes 1.7, Absolute Monocytes 0.9  
H, Absolute Eosinophils 0.6, Absolute Basophils 0.1, PUBS MCHC 31.6  L
 
 
Lines/Diet/Fluids
Fluids/Infusions: none
Catheters/Tubes: underwood
Underwood Still Needed? Yes
Drains Still Needed? Yes
 
Assessment/Plan
Assessment:
80-year-old female was admitted for surgical wound infection in the setting of 
left femur Fx S/P IM Nail (17). She has PMH significant  for stage II 
diastolic congestive heart failure on lasix, chronic atrial fibrillation who is 
on metoprolol and pradaxa s/p pacemaker placed in , hypothyroidism, 
depression, COPD not on home oxygen, hypertension, hyperlipidemia, pulmonary 
hypertension with RVP Greater than 50.
 
Pertinent labs
WBC:9.9 H&H:10.4, 32.7
 
pre-op pelvic CT- 
IMPRESSION:
Status post left hip replacement. Large subcutaneous fluid collection in the
soft tissues lateral to the left hip. This could be aspirated percutaneously.
 
deep tissue from OR Culture from left upper extremity showed growth of staph 
aureus.
 
 
# Left hip-surgical site infection POD#2 I&D
* Underwood catheter pr surgery
* 2 drains in left upper extremity (self suction). Drainage is serosanginous. 
Consider discontinuing the drains depending upon the output as per ortho reccs.
* OOb for ambulation 
* Monitor for fever, leukocytosis, worsening pain, surgical site drainage
* Deep tissue Culture grew staph aureus, Antibiotic changed to vancomycin 1250 
MG IV 
* Pradaxa 150mg as per surgery recomendation
* Continue to assess for calf swelling, none now, will obtain u/s to rule out 
dvt if changes
* Plan to D/C to STR
#Congestive heart failure
* Lasix 40 mg daily
* Chest x-ray -cardiomegaly, No pulmonary edema
* continue home dose of Lasix
* Strict I's and O's
* Daily weights/ I/Os/ Salt restricted diet
#Chronic atrial fibrillation s/p pacemaker 
* Pradaxa 150 mg resumed
* Continue metoprolol 50 mg.
# Hypertension
* Continue losartan 25 mg
# Hypothyroidism
* Continue Synthyroid 0.17 mg
#Depression
* Continue paroxetine 40 mg daily
#Hyperlipidemia
* Continue statins
* Cosidering her ABCVD score and multiple cardiac RF, I think patient needs high
dose statins
#Rash
* under the breast and around bikini line 
* nystatin BID 
# Urinary tract infection
* resolved (ampicillin 200mg iv q6  3days  to )
 
 
 
 
 
Full code
reg diet
DVT prophylaxis- Pradaxa 150mg
pain pathway
Problem List:
 1. Infected wound
 
 2. Atrial fibrillation
 
 3. Intertrochanteric fracture of left hip
 
Pain Ratin
Pain Location:
left thigh and hip
Pain Goal: Pain 4 or less
Pain Plan:
po tynelol
percocid
morphine
Tomorrow's Labs & Rationales:
cbc
bmp
You can access the [facility], offered by [facility], by registering with the following website: [email or web address]